# Patient Record
Sex: FEMALE | Race: WHITE | NOT HISPANIC OR LATINO | URBAN - NONMETROPOLITAN AREA
[De-identification: names, ages, dates, MRNs, and addresses within clinical notes are randomized per-mention and may not be internally consistent; named-entity substitution may affect disease eponyms.]

---

## 2017-01-04 RX ORDER — OXYCODONE AND ACETAMINOPHEN 10; 325 MG/1; MG/1
1 TABLET ORAL 3 TIMES DAILY
COMMUNITY

## 2017-01-04 RX ORDER — ASPIRIN 325 MG
325 TABLET ORAL DAILY
COMMUNITY

## 2017-01-04 RX ORDER — HYDROCHLOROTHIAZIDE 25 MG/1
25 TABLET ORAL 2 TIMES DAILY
COMMUNITY

## 2017-01-04 RX ORDER — PRAVASTATIN SODIUM 40 MG
40 TABLET ORAL DAILY
COMMUNITY

## 2017-01-04 RX ORDER — GABAPENTIN 600 MG/1
600 TABLET ORAL 4 TIMES DAILY
COMMUNITY

## 2017-01-04 RX ORDER — AMITRIPTYLINE HYDROCHLORIDE 50 MG/1
100 TABLET, FILM COATED ORAL NIGHTLY
COMMUNITY

## 2017-01-04 RX ORDER — FENOFIBRATE 145 MG/1
145 TABLET, COATED ORAL DAILY
COMMUNITY

## 2017-01-04 RX ORDER — ATORVASTATIN CALCIUM 40 MG/1
40 TABLET, FILM COATED ORAL DAILY
COMMUNITY

## 2017-02-23 ENCOUNTER — APPOINTMENT (OUTPATIENT)
Dept: ULTRASOUND IMAGING | Facility: HOSPITAL | Age: 62
End: 2017-02-23
Attending: SURGERY

## 2017-06-09 ENCOUNTER — TELEPHONE (OUTPATIENT)
Dept: CARDIOLOGY | Age: 62
End: 2017-06-09

## 2017-07-10 ENCOUNTER — TELEPHONE (OUTPATIENT)
Dept: CARDIOLOGY | Age: 62
End: 2017-07-10

## 2017-07-28 ENCOUNTER — OFFICE VISIT (OUTPATIENT)
Dept: CARDIOLOGY | Age: 62
End: 2017-07-28
Payer: MEDICARE

## 2017-07-28 VITALS
HEART RATE: 58 BPM | DIASTOLIC BLOOD PRESSURE: 62 MMHG | BODY MASS INDEX: 26.8 KG/M2 | SYSTOLIC BLOOD PRESSURE: 122 MMHG | WEIGHT: 157 LBS | HEIGHT: 64 IN

## 2017-07-28 DIAGNOSIS — I10 ESSENTIAL HYPERTENSION: ICD-10-CM

## 2017-07-28 DIAGNOSIS — I25.10 CORONARY ARTERY DISEASE INVOLVING NATIVE HEART WITHOUT ANGINA PECTORIS, UNSPECIFIED VESSEL OR LESION TYPE: ICD-10-CM

## 2017-07-28 DIAGNOSIS — Z72.0 TOBACCO ABUSE: ICD-10-CM

## 2017-07-28 DIAGNOSIS — E78.2 MIXED HYPERLIPIDEMIA: ICD-10-CM

## 2017-07-28 DIAGNOSIS — R07.89 OTHER CHEST PAIN: Primary | ICD-10-CM

## 2017-07-28 DIAGNOSIS — E11.8 TYPE 2 DIABETES MELLITUS WITH COMPLICATION, WITHOUT LONG-TERM CURRENT USE OF INSULIN (HCC): ICD-10-CM

## 2017-07-28 PROCEDURE — 3017F COLORECTAL CA SCREEN DOC REV: CPT | Performed by: CLINICAL NURSE SPECIALIST

## 2017-07-28 PROCEDURE — 99406 BEHAV CHNG SMOKING 3-10 MIN: CPT | Performed by: CLINICAL NURSE SPECIALIST

## 2017-07-28 PROCEDURE — 99214 OFFICE O/P EST MOD 30 MIN: CPT | Performed by: CLINICAL NURSE SPECIALIST

## 2017-07-28 PROCEDURE — G8598 ASA/ANTIPLAT THER USED: HCPCS | Performed by: CLINICAL NURSE SPECIALIST

## 2017-07-28 PROCEDURE — 4004F PT TOBACCO SCREEN RCVD TLK: CPT | Performed by: CLINICAL NURSE SPECIALIST

## 2017-07-28 PROCEDURE — G8419 CALC BMI OUT NRM PARAM NOF/U: HCPCS | Performed by: CLINICAL NURSE SPECIALIST

## 2017-07-28 PROCEDURE — G8427 DOCREV CUR MEDS BY ELIG CLIN: HCPCS | Performed by: CLINICAL NURSE SPECIALIST

## 2017-07-28 PROCEDURE — 3014F SCREEN MAMMO DOC REV: CPT | Performed by: CLINICAL NURSE SPECIALIST

## 2017-07-28 PROCEDURE — 3046F HEMOGLOBIN A1C LEVEL >9.0%: CPT | Performed by: CLINICAL NURSE SPECIALIST

## 2017-07-28 ASSESSMENT — ENCOUNTER SYMPTOMS
HEARTBURN: 0
ORTHOPNEA: 0
NAUSEA: 0
SHORTNESS OF BREATH: 1
VOMITING: 0
BLURRED VISION: 0
CHEST TIGHTNESS: 0
COUGH: 0

## 2017-08-01 ENCOUNTER — HOSPITAL ENCOUNTER (OUTPATIENT)
Dept: NON INVASIVE DIAGNOSTICS | Age: 62
Discharge: HOME | End: 2017-08-01
Payer: MEDICARE

## 2017-08-01 VITALS
BODY MASS INDEX: 26.95 KG/M2 | WEIGHT: 157 LBS | HEART RATE: 98 BPM | DIASTOLIC BLOOD PRESSURE: 63 MMHG | RESPIRATION RATE: 16 BRPM | SYSTOLIC BLOOD PRESSURE: 143 MMHG

## 2017-08-01 DIAGNOSIS — R07.89 OTHER CHEST PAIN: ICD-10-CM

## 2017-08-01 PROCEDURE — 2580000003 HC RX 258: Performed by: INTERNAL MEDICINE

## 2017-08-01 PROCEDURE — 93350 STRESS TTE ONLY: CPT

## 2017-08-01 PROCEDURE — 6360000002 HC RX W HCPCS: Performed by: INTERNAL MEDICINE

## 2017-08-01 RX ORDER — SODIUM CHLORIDE 9 MG/ML
INJECTION, SOLUTION INTRAVENOUS
Status: COMPLETED | OUTPATIENT
Start: 2017-08-01 | End: 2017-08-01

## 2017-08-01 RX ORDER — DOBUTAMINE HYDROCHLORIDE 200 MG/100ML
10 INJECTION INTRAVENOUS ONCE
Status: COMPLETED | OUTPATIENT
Start: 2017-08-01 | End: 2017-08-01

## 2017-08-01 RX ORDER — SODIUM CHLORIDE 0.9 % (FLUSH) 0.9 %
10 SYRINGE (ML) INJECTION PRN
Status: ACTIVE | OUTPATIENT
Start: 2017-08-01 | End: 2017-08-02

## 2017-08-01 RX ADMIN — SODIUM CHLORIDE 50 ML: 9 INJECTION, SOLUTION INTRAVENOUS at 12:24

## 2017-08-01 RX ADMIN — DOBUTAMINE HYDROCHLORIDE 10 MCG/KG/MIN: 200 INJECTION INTRAVENOUS at 12:24

## 2017-08-01 RX ADMIN — Medication 10 ML: at 12:10

## 2018-02-01 ENCOUNTER — OFFICE VISIT (OUTPATIENT)
Dept: CARDIOLOGY | Age: 63
End: 2018-02-01
Payer: COMMERCIAL

## 2018-02-01 VITALS
HEART RATE: 66 BPM | SYSTOLIC BLOOD PRESSURE: 110 MMHG | HEIGHT: 65 IN | BODY MASS INDEX: 25.83 KG/M2 | WEIGHT: 155 LBS | DIASTOLIC BLOOD PRESSURE: 66 MMHG

## 2018-02-01 DIAGNOSIS — I25.110 CORONARY ARTERY DISEASE INVOLVING NATIVE HEART WITH UNSTABLE ANGINA PECTORIS, UNSPECIFIED VESSEL OR LESION TYPE (HCC): ICD-10-CM

## 2018-02-01 DIAGNOSIS — I10 ESSENTIAL HYPERTENSION: Primary | ICD-10-CM

## 2018-02-01 DIAGNOSIS — E78.2 MIXED HYPERLIPIDEMIA: ICD-10-CM

## 2018-02-01 PROCEDURE — 99213 OFFICE O/P EST LOW 20 MIN: CPT | Performed by: INTERNAL MEDICINE

## 2018-02-01 RX ORDER — NITROGLYCERIN 0.4 MG/1
0.4 TABLET SUBLINGUAL EVERY 5 MIN PRN
Qty: 25 TABLET | Refills: 3 | Status: ON HOLD | OUTPATIENT
Start: 2018-02-01 | End: 2018-06-12 | Stop reason: HOSPADM

## 2018-02-01 RX ORDER — AMITRIPTYLINE HYDROCHLORIDE 100 MG/1
100 TABLET, FILM COATED ORAL NIGHTLY
COMMUNITY

## 2018-02-01 RX ORDER — ATORVASTATIN CALCIUM 40 MG/1
40 TABLET, FILM COATED ORAL DAILY
Status: ON HOLD | COMMUNITY
End: 2018-06-12

## 2018-02-05 NOTE — PROGRESS NOTES
100 mg by mouth nightly      FENOFIBRATE PO Take 72 mg by mouth daily      nitroGLYCERIN (NITROSTAT) 0.4 MG SL tablet Place 1 tablet under the tongue every 5 minutes as needed for Chest pain 25 tablet 3    SITagliptin (JANUVIA) 100 MG tablet Take 100 mg by mouth daily      oxyCODONE-acetaminophen (PERCOCET)  MG per tablet Take 1 tablet by mouth 3 times daily      hydrochlorothiazide (HYDRODIURIL) 25 MG tablet Take 25 mg by mouth daily      aspirin 325 MG tablet Take 325 mg by mouth daily      metoprolol (LOPRESSOR) 50 MG tablet Take 1 tablet by mouth 2 times daily 60 tablet 0    gabapentin (NEURONTIN) 600 MG tablet Take 1 tablet by mouth every 6 hours. Data:  BP Readings from Last 3 Encounters:   02/01/18 110/66   08/01/17 (!) 143/63   07/28/17 122/62    Pulse Readings from Last 3 Encounters:   02/01/18 66   08/01/17 98   07/28/17 58        REVIEW OF SYSTEMS  Constitutional:  Negative for diaphoresis, fever, appetite change or unexpected weight change. HENT:  Negative for nosebleeds, facial swelling, rhinorrhea and neck stiffness. RESPIRATORY:  Negative shortness of breath, cough or sputum production. No wheezing or stridor. CARDIOVASCULAR:  There is no angina, no overt heart failure, and no syncope. GASTROINTESTINAL:   Negative for abdominal distention. GENITOURINARY:  Negative for dysuria, urgency and frequency. MUSCULOSKELETAL:   Negative for myalgia, arthralgia and gait problem. SKIN:  Negative for color change, pallor, rash and wound. NEUROLOGICAL:   Negative for dizziness, weakness, light-headedness, numbness and headaches. Negative for speech difficulty. HEMATOLOGICAL:   Negative for bruising and bleeding easily. PSYCHIATRIC/BEHAVIORAL:   No excessive anxiety or confusion. Except as noted in the HPI, all other systems are negative. PHYSICAL EXAMINATION  GENERAL:  Alert and oriented x3 in no apparent distress. Short-term and long-term memory intact.   Judgment intact. Oriented to time, place and person. No depression, anxiety or agitation. Vital Signs:  /66   Pulse 66   Ht 5' 4.5\" (1.638 m)   Wt 155 lb (70.3 kg)   BMI 26.19 kg/m²    HEAD:  Normocephalic without evidence of old or recent trauma. EYES:  Sclerae clear. Conjunctivae pink. Pupils equal and round. NOSE:  Negative nasal discharge or epistaxis. THROAT:  No lesions on lips or buccal mucosa. NECK:  Supple without mass or JVD. Carotid pulses 2+ to palpation bilaterally without bruit. No thyromegaly noted. CHEST:  Equal bilateral expansion. RESPIRATORY:  The lungs are clear to auscultation. Normal respiratory effort. CARDIOVASCULAR:  The heart's rhythm is regular with a normal rate. No audible murmurs or gallops. ABDOMEN:  Soft, nontender. Exhibits no distension. Bowel sounds are normal.   UPPER EXTREMITY EVALUATION:  Radial pulses palpable bilaterally. No cyanosis, clubbing or edema. LOWER EXTREMITY EVALUATION:  Femoral, popliteal, dorsalis pedis, and posterior tibialis pulses 2+ to palpation bilaterally. No cyanosis, clubbing, or peripheral edema. SKIN:  Warm and dry. MUSCULOSKELETAL:  Normal muscle strength and tone. SKIN:  Warm, dry. NEUROLOGIC:  Cranial nerves II through XII are grossly intact. IMPRESSION / PLAN  1. Coronary artery disease without angina. 2.  Hypertension which is well controlled. 3.  Continue current medication regimen. 4.  Wellness visit in six months. Return to see me in a year. Orders Placed This Encounter   Medications    nitroGLYCERIN (NITROSTAT) 0.4 MG SL tablet     Sig: Place 1 tablet under the tongue every 5 minutes as needed for Chest pain     Dispense:  25 tablet     Refill:  3     __________________________________  Gregery Bread. Reid Sullivan M.D., Ph.D., F.A.C.C. [id number] [facility]    cc (pcp):  Sravan Green M.D.

## 2018-06-07 ENCOUNTER — HOSPITAL ENCOUNTER (OUTPATIENT)
Dept: GENERAL RADIOLOGY | Age: 63
Discharge: HOME | End: 2018-06-07
Payer: COMMERCIAL

## 2018-06-07 ENCOUNTER — OFFICE VISIT (OUTPATIENT)
Dept: CARDIOLOGY | Age: 63
End: 2018-06-07
Payer: MEDICARE

## 2018-06-07 VITALS
SYSTOLIC BLOOD PRESSURE: 124 MMHG | WEIGHT: 154 LBS | BODY MASS INDEX: 26.29 KG/M2 | DIASTOLIC BLOOD PRESSURE: 76 MMHG | HEIGHT: 64 IN | HEART RATE: 68 BPM

## 2018-06-07 DIAGNOSIS — R07.9 CHEST PAIN IN ADULT: ICD-10-CM

## 2018-06-07 DIAGNOSIS — R53.83 OTHER FATIGUE: ICD-10-CM

## 2018-06-07 DIAGNOSIS — R11.0 NAUSEA: ICD-10-CM

## 2018-06-07 DIAGNOSIS — R07.9 CHEST PAIN IN ADULT: Primary | ICD-10-CM

## 2018-06-07 DIAGNOSIS — I25.10 ATHEROSCLEROTIC CARDIOVASCULAR DISEASE: ICD-10-CM

## 2018-06-07 LAB
ANION GAP SERPL CALCULATED.3IONS-SCNC: 15 MMOL/L (ref 7–19)
BUN BLDV-MCNC: 9 MG/DL (ref 8–23)
CALCIUM SERPL-MCNC: 9.4 MG/DL (ref 8.8–10.2)
CHLORIDE BLD-SCNC: 94 MMOL/L (ref 98–111)
CO2: 28 MMOL/L (ref 22–29)
CREAT SERPL-MCNC: 0.7 MG/DL (ref 0.5–0.9)
GFR NON-AFRICAN AMERICAN: >60
GLUCOSE BLD-MCNC: 168 MG/DL (ref 74–109)
HCT VFR BLD CALC: 42.9 % (ref 37–47)
HEMOGLOBIN: 13.7 G/DL (ref 12–16)
MCH RBC QN AUTO: 30.2 PG (ref 27–31)
MCHC RBC AUTO-ENTMCNC: 31.9 G/DL (ref 33–37)
MCV RBC AUTO: 94.5 FL (ref 81–99)
PDW BLD-RTO: 13.4 % (ref 11.5–14.5)
PLATELET # BLD: 274 K/UL (ref 130–400)
PMV BLD AUTO: 12 FL (ref 9.4–12.3)
POTASSIUM SERPL-SCNC: 4 MMOL/L (ref 3.5–5)
RBC # BLD: 4.54 M/UL (ref 4.2–5.4)
SODIUM BLD-SCNC: 137 MMOL/L (ref 136–145)
WBC # BLD: 11 K/UL (ref 4.8–10.8)

## 2018-06-07 PROCEDURE — G8427 DOCREV CUR MEDS BY ELIG CLIN: HCPCS | Performed by: INTERNAL MEDICINE

## 2018-06-07 PROCEDURE — 71046 X-RAY EXAM CHEST 2 VIEWS: CPT

## 2018-06-07 PROCEDURE — 3017F COLORECTAL CA SCREEN DOC REV: CPT | Performed by: INTERNAL MEDICINE

## 2018-06-07 PROCEDURE — G8598 ASA/ANTIPLAT THER USED: HCPCS | Performed by: INTERNAL MEDICINE

## 2018-06-07 PROCEDURE — 99214 OFFICE O/P EST MOD 30 MIN: CPT | Performed by: INTERNAL MEDICINE

## 2018-06-07 PROCEDURE — 4004F PT TOBACCO SCREEN RCVD TLK: CPT | Performed by: INTERNAL MEDICINE

## 2018-06-07 PROCEDURE — G8417 CALC BMI ABV UP PARAM F/U: HCPCS | Performed by: INTERNAL MEDICINE

## 2018-06-12 ENCOUNTER — HOSPITAL ENCOUNTER (OUTPATIENT)
Dept: CARDIAC CATH/INVASIVE PROCEDURES | Age: 63
Discharge: HOME | End: 2018-06-12
Attending: INTERNAL MEDICINE | Admitting: INTERNAL MEDICINE
Payer: MEDICARE

## 2018-06-12 VITALS
TEMPERATURE: 98.6 F | WEIGHT: 154 LBS | HEIGHT: 64 IN | BODY MASS INDEX: 26.29 KG/M2 | SYSTOLIC BLOOD PRESSURE: 131 MMHG | DIASTOLIC BLOOD PRESSURE: 77 MMHG | HEART RATE: 73 BPM | RESPIRATION RATE: 18 BRPM | OXYGEN SATURATION: 95 %

## 2018-06-12 PROBLEM — I20.0 ANGINA PECTORIS, CRESCENDO (HCC): Status: ACTIVE | Noted: 2018-06-12

## 2018-06-12 PROCEDURE — 6360000002 HC RX W HCPCS

## 2018-06-12 PROCEDURE — 2580000003 HC RX 258: Performed by: INTERNAL MEDICINE

## 2018-06-12 PROCEDURE — 2720000001 HC MISC SURG SUPPLY STERILE $51-500

## 2018-06-12 PROCEDURE — 93458 L HRT ARTERY/VENTRICLE ANGIO: CPT | Performed by: INTERNAL MEDICINE

## 2018-06-12 PROCEDURE — 2709999900 HC NON-CHARGEABLE SUPPLY

## 2018-06-12 PROCEDURE — 93005 ELECTROCARDIOGRAM TRACING: CPT

## 2018-06-12 PROCEDURE — 99999 PR OFFICE/OUTPT VISIT,PROCEDURE ONLY: CPT | Performed by: INTERNAL MEDICINE

## 2018-06-12 PROCEDURE — C1760 CLOSURE DEV, VASC: HCPCS

## 2018-06-12 PROCEDURE — C1894 INTRO/SHEATH, NON-LASER: HCPCS

## 2018-06-12 PROCEDURE — 99024 POSTOP FOLLOW-UP VISIT: CPT | Performed by: INTERNAL MEDICINE

## 2018-06-12 RX ORDER — SODIUM CHLORIDE 9 MG/ML
INJECTION, SOLUTION INTRAVENOUS CONTINUOUS
Status: DISCONTINUED | OUTPATIENT
Start: 2018-06-12 | End: 2018-06-12 | Stop reason: HOSPADM

## 2018-06-12 RX ORDER — SODIUM CHLORIDE 9 MG/ML
INJECTION, SOLUTION INTRAVENOUS CONTINUOUS
Status: DISCONTINUED | OUTPATIENT
Start: 2018-06-12 | End: 2018-06-12 | Stop reason: SDUPTHER

## 2018-06-12 RX ORDER — SODIUM CHLORIDE 0.9 % (FLUSH) 0.9 %
10 SYRINGE (ML) INJECTION PRN
Status: DISCONTINUED | OUTPATIENT
Start: 2018-06-12 | End: 2018-06-12 | Stop reason: HOSPADM

## 2018-06-12 RX ORDER — ATORVASTATIN CALCIUM 40 MG/1
40 TABLET, FILM COATED ORAL DAILY
Qty: 30 TABLET | Refills: 3 | Status: SHIPPED | OUTPATIENT
Start: 2018-06-12 | End: 2018-08-01 | Stop reason: ALTCHOICE

## 2018-06-12 RX ORDER — ISOSORBIDE MONONITRATE 30 MG/1
30 TABLET, EXTENDED RELEASE ORAL DAILY
Qty: 30 TABLET | Refills: 3 | Status: SHIPPED | OUTPATIENT
Start: 2018-06-12 | End: 2018-11-02 | Stop reason: SDUPTHER

## 2018-06-12 RX ORDER — SODIUM CHLORIDE 0.9 % (FLUSH) 0.9 %
10 SYRINGE (ML) INJECTION EVERY 12 HOURS SCHEDULED
Status: DISCONTINUED | OUTPATIENT
Start: 2018-06-12 | End: 2018-06-12 | Stop reason: HOSPADM

## 2018-06-12 RX ORDER — NITROGLYCERIN 0.4 MG/1
0.4 TABLET SUBLINGUAL EVERY 5 MIN PRN
Qty: 25 TABLET | Refills: 3 | Status: SHIPPED | OUTPATIENT
Start: 2018-06-12

## 2018-06-12 RX ADMIN — SODIUM CHLORIDE: 9 INJECTION, SOLUTION INTRAVENOUS at 11:46

## 2018-06-13 LAB
EKG P AXIS: 69 DEGREES
EKG P-R INTERVAL: 182 MS
EKG Q-T INTERVAL: 380 MS
EKG QRS DURATION: 98 MS
EKG QTC CALCULATION (BAZETT): 413 MS
EKG T AXIS: 76 DEGREES

## 2018-08-01 ENCOUNTER — OFFICE VISIT (OUTPATIENT)
Dept: CARDIOLOGY | Age: 63
End: 2018-08-01
Payer: MEDICARE

## 2018-08-01 VITALS
WEIGHT: 153.6 LBS | HEART RATE: 64 BPM | SYSTOLIC BLOOD PRESSURE: 136 MMHG | DIASTOLIC BLOOD PRESSURE: 80 MMHG | BODY MASS INDEX: 26.22 KG/M2 | HEIGHT: 64 IN

## 2018-08-01 DIAGNOSIS — I25.10 CORONARY ARTERY DISEASE INVOLVING NATIVE HEART WITHOUT ANGINA PECTORIS, UNSPECIFIED VESSEL OR LESION TYPE: Primary | ICD-10-CM

## 2018-08-01 DIAGNOSIS — I10 ESSENTIAL HYPERTENSION: ICD-10-CM

## 2018-08-01 PROCEDURE — 93000 ELECTROCARDIOGRAM COMPLETE: CPT | Performed by: INTERNAL MEDICINE

## 2018-08-01 PROCEDURE — 99214 OFFICE O/P EST MOD 30 MIN: CPT | Performed by: INTERNAL MEDICINE

## 2018-08-01 PROCEDURE — 3017F COLORECTAL CA SCREEN DOC REV: CPT | Performed by: INTERNAL MEDICINE

## 2018-08-01 PROCEDURE — G8598 ASA/ANTIPLAT THER USED: HCPCS | Performed by: INTERNAL MEDICINE

## 2018-08-01 PROCEDURE — G8427 DOCREV CUR MEDS BY ELIG CLIN: HCPCS | Performed by: INTERNAL MEDICINE

## 2018-08-01 PROCEDURE — G8417 CALC BMI ABV UP PARAM F/U: HCPCS | Performed by: INTERNAL MEDICINE

## 2018-08-01 PROCEDURE — 4004F PT TOBACCO SCREEN RCVD TLK: CPT | Performed by: INTERNAL MEDICINE

## 2018-08-01 RX ORDER — GLIPIZIDE 10 MG/1
10 TABLET ORAL
COMMUNITY

## 2018-08-01 RX ORDER — ROSUVASTATIN CALCIUM 10 MG/1
10 TABLET, COATED ORAL DAILY
COMMUNITY
End: 2018-10-29 | Stop reason: SDUPTHER

## 2018-08-01 ASSESSMENT — ENCOUNTER SYMPTOMS: SHORTNESS OF BREATH: 0

## 2018-08-01 NOTE — PATIENT INSTRUCTIONS
Tips to Help You Stop Smoking   Cigarette smoking is a preventable cause of death in the [place]. If you have thought about quitting but haven't been able to, here are some reasons why you should and some ways to do it. Here's Why   Quitting smoking now can decrease your risk of getting smoking-related illnesses like:   Heart disease, Stroke,  Cataracts, Macular degeneration, Thyroid conditions, Hearing loss, Erectile dysfunction, Dementia, Osteoporosis. Several types of cancer, including:   Lung, Mouth, Esophagus, Larynx, Bladder, Pancreas, Kidney   Chronic lung diseases:   Bronchitis, Emphysema, Asthma   Here's How   Once you've decided to quit smoking, set your target quit date a few weeks away. In the time leading up to your quit day, try some of these ideas offered by the [address] to help you successfully quit smoking. For the best results, work with your doctor. Together, you can test your lung function and compare the results to those of a nonsmoking person. The results can be given to you as your lung age. Finding out your lung age right after having the test done may help you to stop smoking. Your doctor can also discuss with you all of your options and refer you to smoking-cessation support groups. You may wish to use nicotine replacement (gum, patches, inhaler) or one of the prescription medications that have been shown to increase quit rates and prolong abstinence from smoking. But whatever you and your doctor decide on these matters, it will still be you who decides when an how to quit. Here are some techniques:     Switch Brands   Switch to a brand you find distasteful. Change to a brand that is low in tar and nicotine a couple of weeks before your target quit date. This will help change your smoking behavior.  However, do not smoke more cigarettes, inhale them more often or more deeply, or place your fingertips over the

## 2018-08-01 NOTE — PROGRESS NOTES
ADENOIDECTOMY         Family History   Problem Relation Age of Onset    Heart Attack Father        Social History     Social History    Marital status:      Spouse name: N/A    Number of children: N/A    Years of education: N/A     Occupational History    Not on file.      Social History Main Topics    Smoking status: Current Every Day Smoker     Packs/day: 1.00     Types: Cigarettes    Smokeless tobacco: Never Used    Alcohol use No    Drug use: No    Sexual activity: Not on file     Other Topics Concern    Not on file     Social History Narrative    No narrative on file       Allergies   Allergen Reactions    Hydrocodone     Sulfa Antibiotics          Current Outpatient Prescriptions:     rosuvastatin (CRESTOR) 10 MG tablet, Take 10 mg by mouth daily, Disp: , Rfl:     glipiZIDE (GLUCOTROL) 10 MG tablet, Take 10 mg by mouth 2 times daily (before meals), Disp: , Rfl:     isosorbide mononitrate (IMDUR) 30 MG extended release tablet, Take 1 tablet by mouth daily, Disp: 30 tablet, Rfl: 3    nitroGLYCERIN (NITROSTAT) 0.4 MG SL tablet, Place 1 tablet under the tongue every 5 minutes as needed for Chest pain, Disp: 25 tablet, Rfl: 3    metFORMIN (GLUCOPHAGE) 1000 MG tablet, Take 1 tablet by mouth 2 times daily (with meals), Disp: 60 tablet, Rfl: 3    amitriptyline (ELAVIL) 100 MG tablet, Take 100 mg by mouth nightly, Disp: , Rfl:     FENOFIBRATE PO, Take 72 mg by mouth daily, Disp: , Rfl:     SITagliptin (JANUVIA) 100 MG tablet, Take 100 mg by mouth daily, Disp: , Rfl:     oxyCODONE-acetaminophen (PERCOCET)  MG per tablet, Take 1 tablet by mouth 3 times daily, Disp: , Rfl:     hydrochlorothiazide (HYDRODIURIL) 25 MG tablet, Take 25 mg by mouth daily, Disp: , Rfl:     aspirin 325 MG tablet, Take 325 mg by mouth daily, Disp: , Rfl:     metoprolol (LOPRESSOR) 50 MG tablet, Take 1 tablet by mouth 2 times daily, Disp: 60 tablet, Rfl: 0    gabapentin (NEURONTIN) 600 MG tablet, Take 1

## 2018-10-29 RX ORDER — ROSUVASTATIN CALCIUM 10 MG/1
10 TABLET, COATED ORAL DAILY
Qty: 30 TABLET | Refills: 0 | Status: SHIPPED | OUTPATIENT
Start: 2018-10-29

## 2018-11-02 RX ORDER — ISOSORBIDE MONONITRATE 30 MG/1
30 TABLET, EXTENDED RELEASE ORAL DAILY
Qty: 30 TABLET | Refills: 5 | Status: SHIPPED | OUTPATIENT
Start: 2018-11-02 | End: 2019-06-20 | Stop reason: SDUPTHER

## 2019-06-20 ENCOUNTER — OFFICE VISIT (OUTPATIENT)
Dept: CARDIOLOGY | Age: 64
End: 2019-06-20
Payer: MEDICARE

## 2019-06-20 VITALS
WEIGHT: 160 LBS | HEIGHT: 65 IN | SYSTOLIC BLOOD PRESSURE: 110 MMHG | BODY MASS INDEX: 26.66 KG/M2 | HEART RATE: 68 BPM | DIASTOLIC BLOOD PRESSURE: 60 MMHG

## 2019-06-20 DIAGNOSIS — E78.2 MIXED HYPERLIPIDEMIA: ICD-10-CM

## 2019-06-20 DIAGNOSIS — I25.10 CORONARY ARTERY DISEASE INVOLVING NATIVE CORONARY ARTERY OF NATIVE HEART WITHOUT ANGINA PECTORIS: Primary | ICD-10-CM

## 2019-06-20 DIAGNOSIS — I10 ESSENTIAL HYPERTENSION: ICD-10-CM

## 2019-06-20 PROCEDURE — G8427 DOCREV CUR MEDS BY ELIG CLIN: HCPCS | Performed by: INTERNAL MEDICINE

## 2019-06-20 PROCEDURE — G8598 ASA/ANTIPLAT THER USED: HCPCS | Performed by: INTERNAL MEDICINE

## 2019-06-20 PROCEDURE — G8417 CALC BMI ABV UP PARAM F/U: HCPCS | Performed by: INTERNAL MEDICINE

## 2019-06-20 PROCEDURE — 4004F PT TOBACCO SCREEN RCVD TLK: CPT | Performed by: INTERNAL MEDICINE

## 2019-06-20 PROCEDURE — 99214 OFFICE O/P EST MOD 30 MIN: CPT | Performed by: INTERNAL MEDICINE

## 2019-06-20 PROCEDURE — 3017F COLORECTAL CA SCREEN DOC REV: CPT | Performed by: INTERNAL MEDICINE

## 2019-06-20 RX ORDER — ISOSORBIDE MONONITRATE 30 MG/1
30 TABLET, EXTENDED RELEASE ORAL DAILY
Qty: 30 TABLET | Refills: 5 | Status: SHIPPED | OUTPATIENT
Start: 2019-06-20 | End: 2020-01-20 | Stop reason: SDUPTHER

## 2019-06-20 RX ORDER — CITALOPRAM 20 MG/1
20 TABLET ORAL DAILY
COMMUNITY

## 2019-06-20 RX ORDER — PANTOPRAZOLE SODIUM 20 MG/1
20 TABLET, DELAYED RELEASE ORAL DAILY
COMMUNITY

## 2019-06-20 RX ORDER — FUROSEMIDE 40 MG/1
40 TABLET ORAL DAILY
COMMUNITY

## 2019-06-20 ASSESSMENT — ENCOUNTER SYMPTOMS
DIARRHEA: 0
RESPIRATORY NEGATIVE: 1
VOMITING: 0
SHORTNESS OF BREATH: 0
GASTROINTESTINAL NEGATIVE: 1
NAUSEA: 0
EYES NEGATIVE: 1

## 2019-06-20 NOTE — PROGRESS NOTES
[facility] Progress Note                            Date:  6/20/2019  Patient: Brian Trinidad  Age:  62 y. o., 1955      Reason for evaluation:         SUBJECTIVE:    Today resting comfortably history of stent placement 3 years ago. Continues to smoke but trying to quit. Has some chronic dyspnea unchanged denies anginal chest pain no recent nitroglycerin to been taken. No other complaints. Review of Systems   Constitutional: Negative. Negative for chills, fever and unexpected weight change. HENT: Negative. Eyes: Negative. Respiratory: Negative. Negative for shortness of breath. Cardiovascular: Negative. Negative for chest pain. Gastrointestinal: Negative. Negative for diarrhea, nausea and vomiting. Endocrine: Negative. Genitourinary: Negative. Musculoskeletal: Negative. Skin: Negative. Neurological: Negative. All other systems reviewed and are negative. OBJECTIVE:     /60   Pulse 68   Ht 5' 4.5\" (1.638 m)   Wt 160 lb (72.6 kg)   BMI 27.04 kg/m²     Labs:   CBC: No results for input(s): WBC, HGB, HCT, PLT in the last 72 hours. BMP:No results for input(s): NA, K, CO2, BUN, CREATININE, LABGLOM, GLUCOSE in the last 72 hours. BNP: No results for input(s): BNP in the last 72 hours. PT/INR: No results for input(s): PROTIME, INR in the last 72 hours. APTT:No results for input(s): APTT in the last 72 hours. CARDIAC ENZYMES:No results for input(s): CKTOTAL, CKMB, CKMBINDEX, TROPONINI in the last 72 hours. FASTING LIPID PANEL:  Lab Results   Component Value Date    HDL 30 12/03/2014    LDLDIRECT 109 12/03/2014    TRIG 496 12/03/2014     LIVER PROFILE:No results for input(s): AST, ALT, LABALBU in the last 72 hours.         Past Medical History:   Diagnosis Date    CAD (coronary artery disease)     Diabetes mellitus ([address]     History of blood transfusion     HTN (hypertension)     Hyperlipidemia     Smoker     Thyroid disease      Past Surgical History:   Procedure Laterality Date    CARDIAC CATHETERIZATION  12/3/14  MDL    stent to LAD. EF 60%     SECTION      COLONOSCOPY      HERNIA REPAIR      THYROID SURGERY      TONSILLECTOMY AND ADENOIDECTOMY       Family History   Problem Relation Age of Onset    Heart Attack Father      Allergies   Allergen Reactions    Hydrocodone     Sulfa Antibiotics      Current Outpatient Medications   Medication Sig Dispense Refill    citalopram (CELEXA) 20 MG tablet Take 20 mg by mouth daily      pantoprazole (PROTONIX) 20 MG tablet Take 20 mg by mouth daily      furosemide (LASIX) 40 MG tablet Take 40 mg by mouth daily      rosuvastatin (CRESTOR) 10 MG tablet Take 1 tablet by mouth daily 30 tablet 0    glipiZIDE (GLUCOTROL) 10 MG tablet Take 10 mg by mouth 2 times daily (before meals)      nitroGLYCERIN (NITROSTAT) 0.4 MG SL tablet Place 1 tablet under the tongue every 5 minutes as needed for Chest pain 25 tablet 3    metFORMIN (GLUCOPHAGE) 1000 MG tablet Take 1 tablet by mouth 2 times daily (with meals) 60 tablet 3    amitriptyline (ELAVIL) 100 MG tablet Take 100 mg by mouth nightly      FENOFIBRATE PO Take 72 mg by mouth daily      SITagliptin (JANUVIA) 100 MG tablet Take 100 mg by mouth daily      oxyCODONE-acetaminophen (PERCOCET)  MG per tablet Take 1 tablet by mouth 3 times daily      aspirin 325 MG tablet Take 325 mg by mouth daily      metoprolol (LOPRESSOR) 50 MG tablet Take 1 tablet by mouth 2 times daily 60 tablet 0    gabapentin (NEURONTIN) 600 MG tablet Take 1 tablet by mouth 4 times daily. Jason Anne isosorbide mononitrate (IMDUR) 30 MG extended release tablet Take 1 tablet by mouth daily 30 tablet 5     No current facility-administered medications for this visit.       Social History     Socioeconomic History    Marital status:      Spouse name: Not on file    Number of children: Not on file    Years of education: Not on file    Highest education level: Not

## 2019-12-20 ENCOUNTER — TELEPHONE (OUTPATIENT)
Dept: CARDIOLOGY | Age: 64
End: 2019-12-20

## 2020-01-20 ENCOUNTER — OFFICE VISIT (OUTPATIENT)
Dept: CARDIOLOGY | Age: 65
End: 2020-01-20
Payer: MEDICARE

## 2020-01-20 VITALS
HEIGHT: 64 IN | WEIGHT: 160 LBS | DIASTOLIC BLOOD PRESSURE: 78 MMHG | OXYGEN SATURATION: 96 % | SYSTOLIC BLOOD PRESSURE: 138 MMHG | BODY MASS INDEX: 27.31 KG/M2

## 2020-01-20 PROCEDURE — 3017F COLORECTAL CA SCREEN DOC REV: CPT | Performed by: NURSE PRACTITIONER

## 2020-01-20 PROCEDURE — G8484 FLU IMMUNIZE NO ADMIN: HCPCS | Performed by: NURSE PRACTITIONER

## 2020-01-20 PROCEDURE — 93000 ELECTROCARDIOGRAM COMPLETE: CPT | Performed by: NURSE PRACTITIONER

## 2020-01-20 PROCEDURE — G8427 DOCREV CUR MEDS BY ELIG CLIN: HCPCS | Performed by: NURSE PRACTITIONER

## 2020-01-20 PROCEDURE — G8417 CALC BMI ABV UP PARAM F/U: HCPCS | Performed by: NURSE PRACTITIONER

## 2020-01-20 PROCEDURE — 4004F PT TOBACCO SCREEN RCVD TLK: CPT | Performed by: NURSE PRACTITIONER

## 2020-01-20 PROCEDURE — 99214 OFFICE O/P EST MOD 30 MIN: CPT | Performed by: NURSE PRACTITIONER

## 2020-01-20 RX ORDER — ISOSORBIDE MONONITRATE 30 MG/1
30 TABLET, EXTENDED RELEASE ORAL DAILY
Qty: 30 TABLET | Refills: 5 | Status: SHIPPED | OUTPATIENT
Start: 2020-01-20

## 2020-01-20 NOTE — PROGRESS NOTES
Systems:    General:      Complaint / Symptom Yes / No / Description if Yes       Fatigue No   Weight gain N/A   Insomnia N/A       Respiratory:        Complaint / Symptom Yes / No / Description if Yes       Cough No   Horseness N/A       Cardiovascular:    Complaint / Symptom Yes / No / Description if Yes       Chest Pain Yes: Episode January 2, 2020   Shortness of Air / Orthopnea No   Presyncope / Syncope No   Palpitations No         Objective:    /78   Ht 5' 4\" (1.626 m)   Wt 160 lb (72.6 kg)   SpO2 96%   BMI 27.46 kg/m²     GENERAL - well developed and well nourished, conversant  HEENT -  PERRLA, Hearing appears normal, gentleman lids are normal.  External inspection of ears and nose appear normal.  NECK - no thyromegaly, no JVD, trachea is in the midline  CARDIOVASCULAR - PMI is in the mid line clavicular position, Normal S1 and S2 with no systolic murmur. No S3 or S4    PULMONARY - no respiratory distress. No wheezes or rales. Lungs are clear to ausculation, normal respiratory effort. ABDOMEN  - soft, non tender, no rebound  MUSCULOSKELETAL  - range of motion of the upper and lower extermites appears normal and equal and is without pain   EXTREMITIES - no significant edema   NEUROLOGIC - gait and station are normal  SKIN - turgor is normal, can warm and dry. PSYCHIATRIC - normal mood and affect, alert and orientated x 3,      ASSESSMENT:    ALL THE CARDIOLOGY PROBLEMS ARE LISTED ABOVE; HOWEVER, THE FOLLOWING SPECIFIC CARDIAC PROBLEMS / CONDITIONS WERE ADDRESSED AND TREATED DURING THE OFFICE VISIT TODAY:                                                                                            MEDICAL DECISION MAKING             Cardiac Specific Problem / Diagnosis  Discussion and Data Reviewed Diagnostic Procedures Ordered Management Options Selected           1.    Chest pain  Initial Encounter   Review and summation of old records:    EKG in the office showing sinus rhythm with a heart rate of 72 bpm.  ST-T wave abnormalities at baseline. Will order Abdiel Valles. Yes Continue current medications:     Yes           2. CAD  Shows no change   Patient is on Imdur 30 mg daily. Beta-blocker, statin, aspirin. Yes Continue current medications:    Yes           3. Hypertension Stable  pressure in the office today 138/78. O2 sat 96% No Continue current medications:       Yes           4. Hyperlipidemia Stable  followed by primary care provider. Patient is on Crestor 10 mg daily. No Continue current medications:       Yes   5. Tobacco abuse -still smoking      Orders Placed This Encounter   Procedures    NM MYOCARDIAL SPECT REST EXERCISE OR RX     Standing Status:   Future     Standing Expiration Date:   1/20/2021     Order Specific Question:   Reason for Exam?     Answer:   Chest pain     Order Specific Question:   Procedure Type     Answer:   Rx    EKG 12 lead     Order Specific Question:   Reason for Exam?     Answer: Other     Orders Placed This Encounter   Medications    isosorbide mononitrate (IMDUR) 30 MG extended release tablet     Sig: Take 1 tablet by mouth daily     Dispense:  30 tablet     Refill:  5       Discussed with patient. Return in about 3 weeks (around 2/10/2020) for results . I greatly appreciate the opportunity to meet Adele Izquierdo and your confidence in allowing me to participate in her cardiovascular care. GENO Williamson NP  1/20/2020 3:21 PM                    This dictation was generated by voice recognition computer software. Although all attempts are made to edit dictation for accuracy, there may be errors in the transcription that are not intended.

## 2020-01-20 NOTE — PATIENT INSTRUCTIONS
to the test.   Nitroglycerin patches must be taken off 1 hour before testing.  Wear comfortable clothing.  Please refrain from any strenuous exercise or activities the day before your test, or the day of your test.   The Nuclear Lexiscan Stress test takes about 2 ½ to 3 hours to complete. If for any reason you are unable to keep this appointment, please contact Outpatient Scheduling, [phone], as soon as possible to reschedule.

## 2020-01-23 ENCOUNTER — TELEPHONE (OUTPATIENT)
Dept: CARDIOLOGY | Age: 65
End: 2020-01-23

## 2020-02-20 ENCOUNTER — HOSPITAL ENCOUNTER (OUTPATIENT)
Dept: NUCLEAR MEDICINE | Age: 65
Discharge: HOME | End: 2020-02-22
Payer: MEDICARE

## 2020-02-20 PROCEDURE — A9500 TC99M SESTAMIBI: HCPCS | Performed by: NURSE PRACTITIONER

## 2020-02-20 PROCEDURE — 93017 CV STRESS TEST TRACING ONLY: CPT

## 2020-02-20 PROCEDURE — 3430000000 HC RX DIAGNOSTIC RADIOPHARMACEUTICAL: Performed by: NURSE PRACTITIONER

## 2020-02-20 PROCEDURE — 6360000002 HC RX W HCPCS: Performed by: INTERNAL MEDICINE

## 2020-02-20 RX ADMIN — TETRAKIS(2-METHOXYISOBUTYLISOCYANIDE)COPPER(I) TETRAFLUOROBORATE 30 MILLICURIE: 1 INJECTION, POWDER, LYOPHILIZED, FOR SOLUTION INTRAVENOUS at 11:39

## 2020-02-20 RX ADMIN — TETRAKIS(2-METHOXYISOBUTYLISOCYANIDE)COPPER(I) TETRAFLUOROBORATE 10 MILLICURIE: 1 INJECTION, POWDER, LYOPHILIZED, FOR SOLUTION INTRAVENOUS at 11:39

## 2020-02-20 RX ADMIN — REGADENOSON 0.4 MG: 0.08 INJECTION, SOLUTION INTRAVENOUS at 11:10

## 2020-02-24 LAB
LV EF: 72 %
LVEF MODALITY: NORMAL

## 2020-08-05 ENCOUNTER — OFFICE VISIT (OUTPATIENT)
Dept: SURGERY | Age: 65
End: 2020-08-05
Payer: MEDICARE

## 2020-08-05 VITALS
TEMPERATURE: 97.8 F | WEIGHT: 153.6 LBS | BODY MASS INDEX: 25.59 KG/M2 | DIASTOLIC BLOOD PRESSURE: 74 MMHG | HEIGHT: 65 IN | SYSTOLIC BLOOD PRESSURE: 130 MMHG

## 2020-08-05 PROCEDURE — 4004F PT TOBACCO SCREEN RCVD TLK: CPT | Performed by: PHYSICIAN ASSISTANT

## 2020-08-05 PROCEDURE — 99202 OFFICE O/P NEW SF 15 MIN: CPT | Performed by: PHYSICIAN ASSISTANT

## 2020-08-05 PROCEDURE — 3017F COLORECTAL CA SCREEN DOC REV: CPT | Performed by: PHYSICIAN ASSISTANT

## 2020-08-05 PROCEDURE — G8417 CALC BMI ABV UP PARAM F/U: HCPCS | Performed by: PHYSICIAN ASSISTANT

## 2020-08-05 PROCEDURE — G8427 DOCREV CUR MEDS BY ELIG CLIN: HCPCS | Performed by: PHYSICIAN ASSISTANT

## 2020-08-05 NOTE — PROGRESS NOTES
HISTORY OF PRESENT ILLNESS:  Qing Peña comes today with an epigastric hernia. It has been present for a few years but is getting larger. The pain has been mild to moderate. She denies strangulation or incarceration. Patient Active Problem List    Diagnosis Date Noted    Angina pectoris, jorge ([facility] [address] 06/12/2018    Tobacco abuse     Hypotension 01/14/2015    CAD (coronary artery disease)     HTN (hypertension)     Hyperlipidemia     Diabetes mellitus (HCC)      Current Outpatient Medications   Medication Sig Dispense Refill    isosorbide mononitrate (IMDUR) 30 MG extended release tablet Take 1 tablet by mouth daily 30 tablet 5    citalopram (CELEXA) 20 MG tablet Take 20 mg by mouth daily      pantoprazole (PROTONIX) 20 MG tablet Take 20 mg by mouth daily      furosemide (LASIX) 40 MG tablet Take 40 mg by mouth daily      rosuvastatin (CRESTOR) 10 MG tablet Take 1 tablet by mouth daily 30 tablet 0    glipiZIDE (GLUCOTROL) 10 MG tablet Take 10 mg by mouth 2 times daily (before meals)      nitroGLYCERIN (NITROSTAT) 0.4 MG SL tablet Place 1 tablet under the tongue every 5 minutes as needed for Chest pain 25 tablet 3    metFORMIN (GLUCOPHAGE) 1000 MG tablet Take 1 tablet by mouth 2 times daily (with meals) 60 tablet 3    amitriptyline (ELAVIL) 100 MG tablet Take 100 mg by mouth nightly      FENOFIBRATE PO Take 72 mg by mouth daily      SITagliptin (JANUVIA) 100 MG tablet Take 100 mg by mouth daily      oxyCODONE-acetaminophen (PERCOCET)  MG per tablet Take 1 tablet by mouth 3 times daily      aspirin 325 MG tablet Take 325 mg by mouth daily      metoprolol (LOPRESSOR) 50 MG tablet Take 1 tablet by mouth 2 times daily 60 tablet 0    gabapentin (NEURONTIN) 600 MG tablet Take 1 tablet by mouth 4 times daily. .       No current facility-administered medications for this visit.       Allergies: Hydrocodone and Sulfa antibiotics     Past Medical History:   Diagnosis Date    CAD

## 2020-08-06 ENCOUNTER — TELEPHONE (OUTPATIENT)
Dept: SURGERY | Age: 65
End: 2020-08-06

## 2020-08-06 ENCOUNTER — TELEPHONE (OUTPATIENT)
Dept: CARDIOLOGY | Age: 65
End: 2020-08-06

## 2020-08-06 NOTE — TELEPHONE ENCOUNTER
Date: TBD    Cardiologist: Mary    Procedure: epigastric hernia repair    Surgeon: Mitchell Garcia    Last Office Visit: 1/20/20  Reason for office visit and medical concerns addressed at this office visit: cad, htn, hyperlipidemia, dm    Testing Performed and Date of Service:  2/20/20 Nicolette Normal ejection fraction 72% normal perfusion study     RCRI = 0.9   METs 4    Current Medications: imdur, celexa, protonix, lasix, crestor, glipizide, nitro, metformin, amitriptyline, fenofibrate, januvia, percocet, aspirin, lopressor, gabapenting    Is the patient currently taking an anticoagulant? If so, what is the diagnosis the patient has been given to warrant the need for the anticoagulant?  no    Additional Notes: requesting cardiac clearance prior to procedure

## 2020-08-06 NOTE — TELEPHONE ENCOUNTER
Called giorgi oliva and gave her the following appointment information:    Patient is scheduled to have CT of abdomen and Pelvis with oral and IV contrast on 9/21/20 at [address] [place] at 8:30 for 9:00 appt. NPO after midnight and patient to begin drinking her oral contrast  90 minutes prior to 9:00 test, She will see Savanna Fofana the same day  She will  oral contrast when she is town on 8/27/20 for appt with Dr. Abimael Martin.

## 2020-08-27 ENCOUNTER — OFFICE VISIT (OUTPATIENT)
Dept: CARDIOLOGY | Age: 65
End: 2020-08-27
Payer: MEDICARE

## 2020-08-27 VITALS
HEART RATE: 80 BPM | DIASTOLIC BLOOD PRESSURE: 74 MMHG | SYSTOLIC BLOOD PRESSURE: 138 MMHG | BODY MASS INDEX: 25.66 KG/M2 | HEIGHT: 65 IN | WEIGHT: 154 LBS

## 2020-08-27 PROCEDURE — 4004F PT TOBACCO SCREEN RCVD TLK: CPT | Performed by: INTERNAL MEDICINE

## 2020-08-27 PROCEDURE — G8417 CALC BMI ABV UP PARAM F/U: HCPCS | Performed by: INTERNAL MEDICINE

## 2020-08-27 PROCEDURE — G8427 DOCREV CUR MEDS BY ELIG CLIN: HCPCS | Performed by: INTERNAL MEDICINE

## 2020-08-27 PROCEDURE — 99213 OFFICE O/P EST LOW 20 MIN: CPT | Performed by: INTERNAL MEDICINE

## 2020-08-27 PROCEDURE — 3017F COLORECTAL CA SCREEN DOC REV: CPT | Performed by: INTERNAL MEDICINE

## 2020-08-27 ASSESSMENT — ENCOUNTER SYMPTOMS
VOMITING: 0
RESPIRATORY NEGATIVE: 1
NAUSEA: 0
EYES NEGATIVE: 1
DIARRHEA: 0
SHORTNESS OF BREATH: 0
GASTROINTESTINAL NEGATIVE: 1

## 2020-08-27 NOTE — PROGRESS NOTES
[facility] Progress [facility]                            Date:  8/27/2020  Patient: Luis Fernando Rose  Age:  60 y.o., 1955      Reason for evaluation:         SUBJECTIVE:    Seen today resting comfortably he denies anginal chest pain or limiting dyspnea. She complained about being more fatigued perhaps abnormal.  She had recent blood work by her primary care physician but was not aware of any difficulties. Last stress test 2/20/2020 ejection fraction 72% normal perfusion study. Blood pressure 138 or 74 heart 80. Continues to smoke I encouraged her to quit. Appetite is fair no recent weight loss. Review of Systems   Constitutional: Negative. Negative for chills, fever and unexpected weight change. HENT: Negative. Eyes: Negative. Respiratory: Negative. Negative for shortness of breath. Cardiovascular: Negative. Negative for chest pain. Gastrointestinal: Negative. Negative for diarrhea, nausea and vomiting. Endocrine: Negative. Genitourinary: Negative. Musculoskeletal: Negative. Skin: Negative. Neurological: Negative. All other systems reviewed and are negative. OBJECTIVE:     /74   Pulse 80   Ht 5' 4.5\" (1.638 m)   Wt 154 lb (69.9 kg)   BMI 26.03 kg/m²     Labs:   CBC: No results for input(s): WBC, HGB, HCT, PLT in the last 72 hours. BMP:No results for input(s): NA, K, CO2, BUN, CREATININE, LABGLOM, GLUCOSE in the last 72 hours. BNP: No results for input(s): BNP in the last 72 hours. PT/INR: No results for input(s): PROTIME, INR in the last 72 hours. APTT:No results for input(s): APTT in the last 72 hours. CARDIAC ENZYMES:No results for input(s): CKTOTAL, CKMB, CKMBINDEX, TROPONINI in the last 72 hours. FASTING LIPID PANEL:  Lab Results   Component Value Date    HDL 30 12/03/2014    LDLDIRECT 109 12/03/2014    TRIG 496 12/03/2014     LIVER PROFILE:No results for input(s): AST, ALT, LABALBU in the last 72 hours.         Past Medical History: Diagnosis Date    CAD (coronary artery disease)     Diabetes mellitus ([facility] [address]     GERD (gastroesophageal reflux disease)     History of blood transfusion     HTN (hypertension)     Hyperlipidemia     Smoker     Thyroid disease      Past Surgical History:   Procedure Laterality Date    CARDIAC CATHETERIZATION  12/3/14  MDL    stent to LAD.  EF 60%     SECTION      COLONOSCOPY      HERNIA REPAIR      umbilical    THYROID SURGERY      partial    TONSILLECTOMY AND ADENOIDECTOMY       Family History   Problem Relation Age of Onset    Heart Attack Father     Diabetes Maternal Grandmother     Diabetes Paternal Grandmother      Allergies   Allergen Reactions    Hydrocodone     Sulfa Antibiotics      Current Outpatient Medications   Medication Sig Dispense Refill    Empagliflozin (JARDIANCE PO) Take by mouth      isosorbide mononitrate (IMDUR) 30 MG extended release tablet Take 1 tablet by mouth daily 30 tablet 5    citalopram (CELEXA) 20 MG tablet Take 20 mg by mouth daily      pantoprazole (PROTONIX) 20 MG tablet Take 20 mg by mouth daily      furosemide (LASIX) 40 MG tablet Take 40 mg by mouth daily      rosuvastatin (CRESTOR) 10 MG tablet Take 1 tablet by mouth daily 30 tablet 0    nitroGLYCERIN (NITROSTAT) 0.4 MG SL tablet Place 1 tablet under the tongue every 5 minutes as needed for Chest pain 25 tablet 3    metFORMIN (GLUCOPHAGE) 1000 MG tablet Take 1 tablet by mouth 2 times daily (with meals) 60 tablet 3    amitriptyline (ELAVIL) 100 MG tablet Take 100 mg by mouth nightly      FENOFIBRATE PO Take 72 mg by mouth daily      SITagliptin (JANUVIA) 100 MG tablet Take 100 mg by mouth daily      oxyCODONE-acetaminophen (PERCOCET)  MG per tablet Take 1 tablet by mouth 3 times daily      aspirin 325 MG tablet Take 325 mg by mouth daily      metoprolol (LOPRESSOR) 50 MG tablet Take 1 tablet by mouth 2 times daily 60 tablet 0    gabapentin (NEURONTIN) 600 MG tablet Take 1 tablet by mouth 4 times daily. .      glipiZIDE (GLUCOTROL) 10 MG tablet Take 10 mg by mouth 2 times daily (before meals)       No current facility-administered medications for this visit. Social History     Socioeconomic History    Marital status:      Spouse name: Not on file    Number of children: Not on file    Years of education: Not on file    Highest education level: Not on file   Occupational History    Not on file   Social Needs    Financial resource strain: Not on file    Food insecurity     Worry: Not on file     Inability: Not on file    Transportation needs     Medical: Not on file     Non-medical: Not on file   Tobacco Use    Smoking status: Current Every Day Smoker     Packs/day: 1.00     Types: Cigarettes    Smokeless tobacco: Never Used   Substance and Sexual Activity    Alcohol use: No    Drug use: No    Sexual activity: Not on file   Lifestyle    Physical activity     Days per week: Not on file     Minutes per session: Not on file    Stress: Not on file   Relationships    Social connections     Talks on phone: Not on file     Gets together: Not on file     Attends [employer]ian[employer] service: Not on file     Active member of club or organization: Not on file     Attends meetings of clubs or organizations: Not on file     Relationship status: Not on file    Intimate partner violence     Fear of current or ex partner: Not on file     Emotionally abused: Not on file     Physically abused: Not on file     Forced sexual activity: Not on file   Other Topics Concern    Not on file   Social History Narrative    Not on file       Physical Examination:  /74   Pulse 80   Ht 5' 4.5\" (1.638 m)   Wt 154 lb (69.9 kg)   BMI 26.03 kg/m²   Physical Exam  Vitals signs reviewed. Constitutional:       Appearance: She is well-developed. Neck:      Vascular: No carotid bruit or JVD. Cardiovascular:      Rate and Rhythm: Normal rate and regular rhythm. Heart sounds: Normal heart sounds. No murmur. No friction rub. No gallop. Pulmonary:      Effort: Pulmonary effort is normal. No respiratory distress. Breath sounds: Normal breath sounds. No wheezing or rales. Abdominal:      General: There is no distension. Tenderness: There is no abdominal tenderness. Lymphadenopathy:      Cervical: No cervical adenopathy. Skin:     General: Skin is warm and dry. ASSESSMENT:     Diagnosis Orders   1. Essential hypertension     2. Coronary artery disease involving native coronary artery of native heart without angina pectoris     3. Mixed hyperlipidemia         PLAN:  No orders of the defined types were placed in this encounter. No orders of the defined types were placed in this encounter. 1. Continue present medications  2. Recommend follow-up assessment in 6 months  3. Encourage exercise    Return in about 6 months (around 2/27/2021) for return to Dr. Clementina Salgado only. Eboni Jernigan MD 8/27/2020 11:42 AM CDT    [facility]      Thisdictation was generated by voice recognition computer software. Although all attempts are made to edit the dictation for accuracy, there may be errors in the transcription that are not intended.

## 2020-09-28 ENCOUNTER — HOSPITAL ENCOUNTER (OUTPATIENT)
Dept: CT IMAGING | Age: 65
Discharge: HOME | End: 2020-09-28
Payer: MEDICARE

## 2020-09-28 ENCOUNTER — OFFICE VISIT (OUTPATIENT)
Dept: SURGERY | Age: 65
End: 2020-09-28
Payer: MEDICARE

## 2020-09-28 VITALS
WEIGHT: 150 LBS | BODY MASS INDEX: 24.99 KG/M2 | DIASTOLIC BLOOD PRESSURE: 72 MMHG | SYSTOLIC BLOOD PRESSURE: 124 MMHG | HEART RATE: 84 BPM | HEIGHT: 65 IN

## 2020-09-28 LAB
GFR AFRICAN AMERICAN: >60
GFR NON-AFRICAN AMERICAN: >60
PERFORMED ON: NORMAL
POC CREATININE: 0.8 MG/DL (ref 0.3–1.3)
POC SAMPLE TYPE: NORMAL

## 2020-09-28 PROCEDURE — 99213 OFFICE O/P EST LOW 20 MIN: CPT | Performed by: PHYSICIAN ASSISTANT

## 2020-09-28 PROCEDURE — 6360000004 HC RX CONTRAST MEDICATION: Performed by: PHYSICIAN ASSISTANT

## 2020-09-28 PROCEDURE — G8427 DOCREV CUR MEDS BY ELIG CLIN: HCPCS | Performed by: PHYSICIAN ASSISTANT

## 2020-09-28 PROCEDURE — 74177 CT ABD & PELVIS W/CONTRAST: CPT

## 2020-09-28 PROCEDURE — 3017F COLORECTAL CA SCREEN DOC REV: CPT | Performed by: PHYSICIAN ASSISTANT

## 2020-09-28 PROCEDURE — G8417 CALC BMI ABV UP PARAM F/U: HCPCS | Performed by: PHYSICIAN ASSISTANT

## 2020-09-28 PROCEDURE — 82565 ASSAY OF CREATININE: CPT

## 2020-09-28 PROCEDURE — 4004F PT TOBACCO SCREEN RCVD TLK: CPT | Performed by: PHYSICIAN ASSISTANT

## 2020-09-28 RX ORDER — ASPIRIN 81 MG/1
81 TABLET ORAL DAILY
COMMUNITY

## 2020-09-28 RX ADMIN — IOPAMIDOL 90 ML: 755 INJECTION, SOLUTION INTRAVENOUS at 10:59

## 2020-09-28 NOTE — PROGRESS NOTES
1 tablet by mouth 4 times daily. Angélica Austin aspirin 325 MG tablet Take 325 mg by mouth daily       No current facility-administered medications for this visit. Allergies: Hydrocodone and Sulfa antibiotics     Past Medical History:   Diagnosis Date    CAD (coronary artery disease)     Diabetes mellitus ([address]     GERD (gastroesophageal reflux disease)     History of blood transfusion     HTN (hypertension)     Hyperlipidemia     Smoker     Thyroid disease      Past Surgical History:   Procedure Laterality Date    CARDIAC CATHETERIZATION  12/3/14  MDL    stent to LAD. EF 60%     SECTION      COLONOSCOPY      HERNIA REPAIR      umbilical    THYROID SURGERY      partial    TONSILLECTOMY AND ADENOIDECTOMY       Family History   Problem Relation Age of Onset    Heart Attack Father     Diabetes Maternal Grandmother     Diabetes Paternal Grandmother      Social History     Tobacco Use    Smoking status: Current Every Day Smoker     Packs/day: 1.00     Types: Cigarettes    Smokeless tobacco: Never Used   Substance Use Topics    Alcohol use: No     Cardiology:  Patient has been wrist stratified by her cardiologist and patient is noted to be low risk. REVIEW OF SYSTEMS:  Review of systems have been reviewed and noted above. All other review of systems are negative. PHYSICAL EXAMINATION:  /72 (Site: Left Upper Arm, Position: Sitting, Cuff Size: Medium Adult)   Pulse 84   Ht 5' 4.5\" (1.638 m)   Wt 150 lb (68 kg)   BMI 25.35 kg/m²        Constitutional:  25-year-old female who appears to be in no acute distress. HEENT head normocephalic atraumatic  No evidence of carotid bruits    Heart:  Regular rate and rhythm no murmurs or gallops    Lungs:  Breath sounds are distant. No wheezes or rhonchi    Abdominal exam reveals a epigastric hernia probably 4 cm in width. There is no evidence of strangulation. It reduces easily. There is mild pain to palpation.     Extremities no clubbing cyanosis appreciable      Impression:  Epigastric hernia with no evidence strangulation or incarceration  Coronary artery disease last scan showing a normal ejection fraction of 72%    PLAN:  Risk benefits and alternatives of surgery have been discussed with the patient. Patient wishes to proceed with surgery the first week in December. I will plan to see her back in mid November to discuss surgery again. She will continue to wear her abdominal binder at this time and notify us of any changes in her symptoms.

## 2020-11-16 ENCOUNTER — OFFICE VISIT (OUTPATIENT)
Dept: SURGERY | Age: 65
End: 2020-11-16
Payer: MEDICARE

## 2020-11-16 VITALS
SYSTOLIC BLOOD PRESSURE: 120 MMHG | HEIGHT: 64 IN | BODY MASS INDEX: 24.69 KG/M2 | WEIGHT: 144.6 LBS | TEMPERATURE: 97.8 F | DIASTOLIC BLOOD PRESSURE: 70 MMHG

## 2020-11-16 PROCEDURE — 3017F COLORECTAL CA SCREEN DOC REV: CPT | Performed by: PHYSICIAN ASSISTANT

## 2020-11-16 PROCEDURE — G8420 CALC BMI NORM PARAMETERS: HCPCS | Performed by: PHYSICIAN ASSISTANT

## 2020-11-16 PROCEDURE — G8427 DOCREV CUR MEDS BY ELIG CLIN: HCPCS | Performed by: PHYSICIAN ASSISTANT

## 2020-11-16 PROCEDURE — G8484 FLU IMMUNIZE NO ADMIN: HCPCS | Performed by: PHYSICIAN ASSISTANT

## 2020-11-16 PROCEDURE — 4004F PT TOBACCO SCREEN RCVD TLK: CPT | Performed by: PHYSICIAN ASSISTANT

## 2020-11-16 PROCEDURE — 99212 OFFICE O/P EST SF 10 MIN: CPT | Performed by: PHYSICIAN ASSISTANT

## 2020-11-16 NOTE — LETTER
Preop Orders:     Patient: Aneesh Kwong  : 1955    Hospital:  [facility]        Admitting Physician:            Diagnosis:   Ventral Hernia (Epigastric)    Procedure:  Ventral Hernia Repair With Mesh    Anesthesia: General    Admission:Outpatient    Date:12/15/20    Labs:per anesthesia    Pre-Op Meds:  Kefzol 2grm IV    Latex Allergy: no    Beta Blocker: yes - patient advised to take    Medication Instructions: No plavix for 2 weeks prior to procedure; no asprin for 3 days prior to procedure    This has been electronically signed by Pippa Wright M.D.

## 2020-11-30 NOTE — PROGRESS NOTES
HISTORY OF PRESENT ILLNESS:  James Marieutant comes today to discuss surgery for an epigastric hernia. The hernia has been present for several years. CT has demonstrated a midline epigastric hernia with some transverse colon protrusion. There is no evidence of strangulation or incarceration. Patient Active Problem List    Diagnosis Date Noted    Angina pectoris, jorge ([facility] [address] 06/12/2018    Tobacco abuse     Hypotension 01/14/2015    CAD (coronary artery disease)     HTN (hypertension)     Hyperlipidemia     Diabetes mellitus (HCC)      Current Outpatient Medications   Medication Sig Dispense Refill    mupirocin (BACTROBAN) 2 % ointment Apply to each nostril BID x 5 days prior to surgery.  1 Tube 0    aspirin 81 MG EC tablet Take 81 mg by mouth daily      Empagliflozin (JARDIANCE PO) Take by mouth      isosorbide mononitrate (IMDUR) 30 MG extended release tablet Take 1 tablet by mouth daily 30 tablet 5    citalopram (CELEXA) 20 MG tablet Take 20 mg by mouth daily      pantoprazole (PROTONIX) 20 MG tablet Take 20 mg by mouth daily      furosemide (LASIX) 40 MG tablet Take 40 mg by mouth daily      rosuvastatin (CRESTOR) 10 MG tablet Take 1 tablet by mouth daily 30 tablet 0    glipiZIDE (GLUCOTROL) 10 MG tablet Take 10 mg by mouth 2 times daily (before meals)      nitroGLYCERIN (NITROSTAT) 0.4 MG SL tablet Place 1 tablet under the tongue every 5 minutes as needed for Chest pain 25 tablet 3    metFORMIN (GLUCOPHAGE) 1000 MG tablet Take 1 tablet by mouth 2 times daily (with meals) 60 tablet 3    amitriptyline (ELAVIL) 100 MG tablet Take 100 mg by mouth nightly      FENOFIBRATE PO Take 72 mg by mouth daily      SITagliptin (JANUVIA) 100 MG tablet Take 100 mg by mouth daily      oxyCODONE-acetaminophen (PERCOCET)  MG per tablet Take 1 tablet by mouth 3 times daily      aspirin 325 MG tablet Take 325 mg by mouth daily      metoprolol (LOPRESSOR) 50 MG tablet Take 1 tablet by mouth 2 times daily 60 tablet 0    gabapentin (NEURONTIN) 600 MG tablet Take 1 tablet by mouth 4 times daily. .       No current facility-administered medications for this visit. Allergies: Hydrocodone and Sulfa antibiotics     Past Medical History:   Diagnosis Date    CAD (coronary artery disease)     Diabetes mellitus ([address]     GERD (gastroesophageal reflux disease)     History of blood transfusion     HTN (hypertension)     Hyperlipidemia     Smoker     Thyroid disease      Past Surgical History:   Procedure Laterality Date    CARDIAC CATHETERIZATION  12/3/14  MDL    stent to LAD. EF 60%     SECTION      COLONOSCOPY      HERNIA REPAIR      umbilical    THYROID SURGERY      partial    TONSILLECTOMY AND ADENOIDECTOMY       Family History   Problem Relation Age of Onset    Heart Attack Father     Diabetes Maternal Grandmother     Diabetes Paternal Grandmother      Social History     Tobacco Use    Smoking status: Current Every Day Smoker     Packs/day: 1.00     Types: Cigarettes    Smokeless tobacco: Never Used   Substance Use Topics    Alcohol use: No     Cardiology:  Patient has been risk stratified by her cardiologist and patient is noted to be low risk. REVIEW OF SYSTEMS:  Review of systems have been reviewed and noted above. All other review of systems are negative. PHYSICAL EXAMINATION:  /70 (Site: Right Upper Arm, Position: Sitting, Cuff Size: Medium Adult)   Temp 97.8 °F (36.6 °C) (Temporal)   Ht 5' 4\" (1.626 m)   Wt 144 lb 9.6 oz (65.6 kg)   BMI 24.82 kg/m²        Constitutional:  81-year-old female who appears to be in no acute distress. HEENT head normocephalic atraumatic  No evidence of carotid bruits    Heart:  Regular rate and rhythm no murmurs or gallops    Lungs:  Breath sounds are distant. No wheezes or rhonchi    Abdominal exam reveals a epigastric hernia probably 4 cm in width. There is no evidence of strangulation. It reduces easily.   There is mild

## 2020-12-10 ENCOUNTER — HOSPITAL ENCOUNTER (OUTPATIENT)
Dept: PREADMISSION TESTING | Age: 65
Discharge: HOME | End: 2020-12-10

## 2021-04-01 ENCOUNTER — OFFICE VISIT (OUTPATIENT)
Dept: CARDIOLOGY CLINIC | Age: 66
End: 2021-04-01
Payer: MEDICARE

## 2021-04-01 VITALS
OXYGEN SATURATION: 94 % | WEIGHT: 140 LBS | BODY MASS INDEX: 23.9 KG/M2 | HEART RATE: 69 BPM | DIASTOLIC BLOOD PRESSURE: 72 MMHG | HEIGHT: 64 IN | SYSTOLIC BLOOD PRESSURE: 122 MMHG

## 2021-04-01 DIAGNOSIS — I20.0 ANGINA PECTORIS, CRESCENDO (HCC): ICD-10-CM

## 2021-04-01 DIAGNOSIS — E78.2 MIXED HYPERLIPIDEMIA: ICD-10-CM

## 2021-04-01 DIAGNOSIS — I25.10 CORONARY ARTERY DISEASE INVOLVING NATIVE CORONARY ARTERY OF NATIVE HEART WITHOUT ANGINA PECTORIS: Primary | ICD-10-CM

## 2021-04-01 DIAGNOSIS — I10 ESSENTIAL HYPERTENSION: ICD-10-CM

## 2021-04-01 PROCEDURE — G8400 PT W/DXA NO RESULTS DOC: HCPCS | Performed by: INTERNAL MEDICINE

## 2021-04-01 PROCEDURE — G8420 CALC BMI NORM PARAMETERS: HCPCS | Performed by: INTERNAL MEDICINE

## 2021-04-01 PROCEDURE — 93000 ELECTROCARDIOGRAM COMPLETE: CPT | Performed by: INTERNAL MEDICINE

## 2021-04-01 PROCEDURE — 1090F PRES/ABSN URINE INCON ASSESS: CPT | Performed by: INTERNAL MEDICINE

## 2021-04-01 PROCEDURE — 3017F COLORECTAL CA SCREEN DOC REV: CPT | Performed by: INTERNAL MEDICINE

## 2021-04-01 PROCEDURE — 4004F PT TOBACCO SCREEN RCVD TLK: CPT | Performed by: INTERNAL MEDICINE

## 2021-04-01 PROCEDURE — 99214 OFFICE O/P EST MOD 30 MIN: CPT | Performed by: INTERNAL MEDICINE

## 2021-04-01 PROCEDURE — 1123F ACP DISCUSS/DSCN MKR DOCD: CPT | Performed by: INTERNAL MEDICINE

## 2021-04-01 PROCEDURE — 4040F PNEUMOC VAC/ADMIN/RCVD: CPT | Performed by: INTERNAL MEDICINE

## 2021-04-01 PROCEDURE — G8427 DOCREV CUR MEDS BY ELIG CLIN: HCPCS | Performed by: INTERNAL MEDICINE

## 2021-04-01 ASSESSMENT — ENCOUNTER SYMPTOMS
RESPIRATORY NEGATIVE: 1
SHORTNESS OF BREATH: 0
VOMITING: 0
GASTROINTESTINAL NEGATIVE: 1
EYES NEGATIVE: 1
NAUSEA: 0
DIARRHEA: 0

## 2021-04-01 NOTE — PROGRESS NOTES
[facility] Progress Note                            Date:  4/1/2021  Patient: Sha Nails  Age:  73 y. o., 1955      Reason for evaluation:         SUBJECTIVE:    Returns today follow-up assessment overall doing well history of coronary artery disease denies anginal chest pain denies dyspnea still smoking. Denies claudication. Blood pressure 122/72 heart 69. Review of Systems   Constitutional: Negative. Negative for chills, fever and unexpected weight change. HENT: Negative. Eyes: Negative. Respiratory: Negative. Negative for shortness of breath. Cardiovascular: Negative. Negative for chest pain. Gastrointestinal: Negative. Negative for diarrhea, nausea and vomiting. Endocrine: Negative. Genitourinary: Negative. Musculoskeletal: Negative. Skin: Negative. Neurological: Negative. All other systems reviewed and are negative. OBJECTIVE:     /72   Pulse 69   Ht 5' 4\" (1.626 m)   Wt 140 lb (63.5 kg)   SpO2 94%   BMI 24.03 kg/m²     Labs:   CBC: No results for input(s): WBC, HGB, HCT, PLT in the last 72 hours. BMP:No results for input(s): NA, K, CO2, BUN, CREATININE, LABGLOM, GLUCOSE in the last 72 hours. BNP: No results for input(s): BNP in the last 72 hours. PT/INR: No results for input(s): PROTIME, INR in the last 72 hours. APTT:No results for input(s): APTT in the last 72 hours. CARDIAC ENZYMES:No results for input(s): CKTOTAL, CKMB, CKMBINDEX, TROPONINI in the last 72 hours. FASTING LIPID PANEL:  Lab Results   Component Value Date    HDL 30 12/03/2014    LDLDIRECT 109 12/03/2014    TRIG 496 12/03/2014     LIVER PROFILE:No results for input(s): AST, ALT, LABALBU in the last 72 hours.         Past Medical History:   Diagnosis Date    CAD (coronary artery disease)     Diabetes mellitus ([address]     GERD (gastroesophageal reflux disease)     History of blood transfusion     HTN (hypertension)     Hyperlipidemia     Smoker     Thyroid disease      Past Surgical History:   Procedure Laterality Date    CARDIAC CATHETERIZATION  12/3/14  MDL    stent to LAD. EF 60%     SECTION      COLONOSCOPY      HERNIA REPAIR      umbilical    THYROID SURGERY      partial    TONSILLECTOMY AND ADENOIDECTOMY       Family History   Problem Relation Age of Onset    Heart Attack Father     Diabetes Maternal Grandmother     Diabetes Paternal Grandmother      Allergies   Allergen Reactions    Hydrocodone     Sulfa Antibiotics      Current Outpatient Medications   Medication Sig Dispense Refill    mupirocin (BACTROBAN) 2 % ointment Apply to each nostril BID x 5 days prior to surgery.  1 Tube 0    aspirin 81 MG EC tablet Take 81 mg by mouth daily      Empagliflozin (JARDIANCE PO) Take by mouth      isosorbide mononitrate (IMDUR) 30 MG extended release tablet Take 1 tablet by mouth daily 30 tablet 5    citalopram (CELEXA) 20 MG tablet Take 20 mg by mouth daily      pantoprazole (PROTONIX) 20 MG tablet Take 20 mg by mouth daily      furosemide (LASIX) 40 MG tablet Take 40 mg by mouth daily      rosuvastatin (CRESTOR) 10 MG tablet Take 1 tablet by mouth daily 30 tablet 0    glipiZIDE (GLUCOTROL) 10 MG tablet Take 10 mg by mouth 2 times daily (before meals)      nitroGLYCERIN (NITROSTAT) 0.4 MG SL tablet Place 1 tablet under the tongue every 5 minutes as needed for Chest pain 25 tablet 3    metFORMIN (GLUCOPHAGE) 1000 MG tablet Take 1 tablet by mouth 2 times daily (with meals) 60 tablet 3    amitriptyline (ELAVIL) 100 MG tablet Take 100 mg by mouth nightly      FENOFIBRATE PO Take 72 mg by mouth daily      SITagliptin (JANUVIA) 100 MG tablet Take 100 mg by mouth daily      oxyCODONE-acetaminophen (PERCOCET)  MG per tablet Take 1 tablet by mouth 3 times daily      aspirin 325 MG tablet Take 325 mg by mouth daily      metoprolol (LOPRESSOR) 50 MG tablet Take 1 tablet by mouth 2 times daily 60 tablet 0    gabapentin (NEURONTIN) 600 MG tablet Take 1 tablet by mouth 4 times daily. .       No current facility-administered medications for this visit. Social History     Socioeconomic History    Marital status:      Spouse name: Not on file    Number of children: Not on file    Years of education: Not on file    Highest education level: Not on file   Occupational History    Not on file   Social Needs    Financial resource strain: Not on file    Food insecurity     Worry: Not on file     Inability: Not on file    Transportation needs     Medical: Not on file     Non-medical: Not on file   Tobacco Use    Smoking status: Current Every Day Smoker     Packs/day: 1.00     Types: Cigarettes    Smokeless tobacco: Never Used   Substance and Sexual Activity    Alcohol use: No    Drug use: No    Sexual activity: Not on file   Lifestyle    Physical activity     Days per week: Not on file     Minutes per session: Not on file    Stress: Not on file   Relationships    Social connections     Talks on phone: Not on file     Gets together: Not on file     Attends [employer] service: Not on file     Active member of club or organization: Not on file     Attends meetings of clubs or organizations: Not on file     Relationship status: Not on file    Intimate partner violence     Fear of current or ex partner: Not on file     Emotionally abused: Not on file     Physically abused: Not on file     Forced sexual activity: Not on file   Other Topics Concern    Not on file   Social History Narrative    Not on file       Physical Examination:  /72   Pulse 69   Ht 5' 4\" (1.626 m)   Wt 140 lb (63.5 kg)   SpO2 94%   BMI 24.03 kg/m²   Physical Exam  Vitals signs reviewed. Constitutional:       Appearance: She is well-developed. Neck:      Vascular: No carotid bruit or JVD. Cardiovascular:      Rate and Rhythm: Normal rate and regular rhythm. Heart sounds: Normal heart sounds. No murmur. No friction rub. No gallop.

## 2021-10-04 ENCOUNTER — OFFICE VISIT (OUTPATIENT)
Dept: CARDIOLOGY CLINIC | Age: 66
End: 2021-10-04
Payer: MEDICARE

## 2021-10-04 VITALS
DIASTOLIC BLOOD PRESSURE: 80 MMHG | WEIGHT: 139 LBS | SYSTOLIC BLOOD PRESSURE: 148 MMHG | HEIGHT: 65 IN | BODY MASS INDEX: 23.16 KG/M2 | HEART RATE: 76 BPM

## 2021-10-04 DIAGNOSIS — E78.2 MIXED HYPERLIPIDEMIA: ICD-10-CM

## 2021-10-04 DIAGNOSIS — I25.10 CORONARY ARTERY DISEASE INVOLVING NATIVE CORONARY ARTERY OF NATIVE HEART WITHOUT ANGINA PECTORIS: Primary | ICD-10-CM

## 2021-10-04 DIAGNOSIS — I10 PRIMARY HYPERTENSION: ICD-10-CM

## 2021-10-04 PROCEDURE — 4040F PNEUMOC VAC/ADMIN/RCVD: CPT | Performed by: INTERNAL MEDICINE

## 2021-10-04 PROCEDURE — G8484 FLU IMMUNIZE NO ADMIN: HCPCS | Performed by: INTERNAL MEDICINE

## 2021-10-04 PROCEDURE — G8420 CALC BMI NORM PARAMETERS: HCPCS | Performed by: INTERNAL MEDICINE

## 2021-10-04 PROCEDURE — 1090F PRES/ABSN URINE INCON ASSESS: CPT | Performed by: INTERNAL MEDICINE

## 2021-10-04 PROCEDURE — 4004F PT TOBACCO SCREEN RCVD TLK: CPT | Performed by: INTERNAL MEDICINE

## 2021-10-04 PROCEDURE — 3017F COLORECTAL CA SCREEN DOC REV: CPT | Performed by: INTERNAL MEDICINE

## 2021-10-04 PROCEDURE — 99213 OFFICE O/P EST LOW 20 MIN: CPT | Performed by: INTERNAL MEDICINE

## 2021-10-04 PROCEDURE — G8400 PT W/DXA NO RESULTS DOC: HCPCS | Performed by: INTERNAL MEDICINE

## 2021-10-04 PROCEDURE — G8427 DOCREV CUR MEDS BY ELIG CLIN: HCPCS | Performed by: INTERNAL MEDICINE

## 2021-10-04 PROCEDURE — 1123F ACP DISCUSS/DSCN MKR DOCD: CPT | Performed by: INTERNAL MEDICINE

## 2021-10-04 ASSESSMENT — ENCOUNTER SYMPTOMS
NAUSEA: 0
EYES NEGATIVE: 1
SHORTNESS OF BREATH: 0
DIARRHEA: 0
RESPIRATORY NEGATIVE: 1
VOMITING: 0
GASTROINTESTINAL NEGATIVE: 1

## 2021-10-04 NOTE — PROGRESS NOTES
[facility] Progress Note                            Date:  10/4/2021  Patient: Sumanth Henry  Age:  73 y. o., 1955      Reason for evaluation:         SUBJECTIVE:    Returns today follow-up assessment for coronary artery disease hypertension hyperlipidemia. Continues to smoke about a pack per day. Denies chest discomfort or dyspnea no other complaints or issues reported. States she had a lipid profile primary care office 3 months ago. Lexiscan 2/20/2020 ejection fraction 72% normal perfusion study. Blood pressure today 148/80 heart 76. Review of Systems   Constitutional: Negative. Negative for chills, fever and unexpected weight change. HENT: Negative. Eyes: Negative. Respiratory: Negative. Negative for shortness of breath. Cardiovascular: Negative. Negative for chest pain. Gastrointestinal: Negative. Negative for diarrhea, nausea and vomiting. Endocrine: Negative. Genitourinary: Negative. Musculoskeletal: Negative. Skin: Negative. Neurological: Negative. All other systems reviewed and are negative. OBJECTIVE:     BP (!) 148/80   Pulse 76   Ht 5' 4.5\" (1.638 m)   Wt 139 lb (63 kg)   BMI 23.49 kg/m²     Labs:   CBC: No results for input(s): WBC, HGB, HCT, PLT in the last 72 hours. BMP:No results for input(s): NA, K, CO2, BUN, CREATININE, LABGLOM, GLUCOSE in the last 72 hours. BNP: No results for input(s): BNP in the last 72 hours. PT/INR: No results for input(s): PROTIME, INR in the last 72 hours. APTT:No results for input(s): APTT in the last 72 hours. CARDIAC ENZYMES:No results for input(s): CKTOTAL, CKMB, CKMBINDEX, TROPONINI in the last 72 hours. FASTING LIPID PANEL:  Lab Results   Component Value Date    HDL 30 12/03/2014    LDLDIRECT 109 12/03/2014    TRIG 496 12/03/2014     LIVER PROFILE:No results for input(s): AST, ALT, LABALBU in the last 72 hours.         Past Medical History:   Diagnosis Date    CAD (coronary artery disease)  Diabetes mellitus ([address]     GERD (gastroesophageal reflux disease)     History of blood transfusion     HTN (hypertension)     Hyperlipidemia     Smoker     Thyroid disease      Past Surgical History:   Procedure Laterality Date    CARDIAC CATHETERIZATION  12/3/14  MDL    stent to LAD. EF 60%     SECTION      COLONOSCOPY      HERNIA REPAIR      umbilical    THYROID SURGERY      partial    TONSILLECTOMY AND ADENOIDECTOMY       Family History   Problem Relation Age of Onset    Heart Attack Father     Diabetes Maternal Grandmother     Diabetes Paternal Grandmother      Allergies   Allergen Reactions    Hydrocodone     Sulfa Antibiotics      Current Outpatient Medications   Medication Sig Dispense Refill    mupirocin (BACTROBAN) 2 % ointment Apply to each nostril BID x 5 days prior to surgery.  1 Tube 0    aspirin 81 MG EC tablet Take 81 mg by mouth daily      Empagliflozin (JARDIANCE PO) Take by mouth      isosorbide mononitrate (IMDUR) 30 MG extended release tablet Take 1 tablet by mouth daily 30 tablet 5    citalopram (CELEXA) 20 MG tablet Take 20 mg by mouth daily      pantoprazole (PROTONIX) 20 MG tablet Take 20 mg by mouth daily      furosemide (LASIX) 40 MG tablet Take 40 mg by mouth daily      rosuvastatin (CRESTOR) 10 MG tablet Take 1 tablet by mouth daily 30 tablet 0    glipiZIDE (GLUCOTROL) 10 MG tablet Take 10 mg by mouth 2 times daily (before meals)      nitroGLYCERIN (NITROSTAT) 0.4 MG SL tablet Place 1 tablet under the tongue every 5 minutes as needed for Chest pain 25 tablet 3    metFORMIN (GLUCOPHAGE) 1000 MG tablet Take 1 tablet by mouth 2 times daily (with meals) 60 tablet 3    amitriptyline (ELAVIL) 100 MG tablet Take 100 mg by mouth nightly      FENOFIBRATE PO Take 72 mg by mouth daily      oxyCODONE-acetaminophen (PERCOCET)  MG per tablet Take 1 tablet by mouth 3 times daily      aspirin 325 MG tablet Take 325 mg by mouth daily      metoprolol (LOPRESSOR) 50 MG tablet Take 1 tablet by mouth 2 times daily 60 tablet 0    gabapentin (NEURONTIN) 600 MG tablet Take 1 tablet by mouth 4 times daily. .      SITagliptin (JANUVIA) 100 MG tablet Take 100 mg by mouth daily (Patient not taking: Reported on 10/4/2021)       No current facility-administered medications for this visit. Social History     Socioeconomic History    Marital status:      Spouse name: Not on file    Number of children: Not on file    Years of education: Not on file    Highest education level: Not on file   Occupational History    Not on file   Tobacco Use    Smoking status: Current Every Day Smoker     Packs/day: 1.00     Types: Cigarettes    Smokeless tobacco: Never Used   Vaping Use    Vaping Use: Never used   Substance and Sexual Activity    Alcohol use: No    Drug use: No    Sexual activity: Not on file   Other Topics Concern    Not on file   Social History Narrative    Not on file     Social Determinants of Health     Financial Resource Strain:     Difficulty of Paying Living Expenses:    Food Insecurity:     Worried About Running Out of Food in the Last Year:     [address] in the Last Year:    Transportation Needs:     Lack of Transportation (Medical):      Lack of Transportation (Non-Medical):    Physical Activity:     Days of Exercise per Week:     Minutes of Exercise per Session:    Stress:     Feeling of Stress :    Social Connections:     Frequency of Communication with Friends and Family:     Frequency of Social Gatherings with Friends and Family:     Attends [employer] Services:     Active Member of Clubs or Organizations:     Attends Club or Organization Meetings:     Marital Status:    Intimate Partner Violence:     Fear of Current or Ex-Partner:     Emotionally Abused:     Physically Abused:     Sexually Abused:        Physical Examination:  BP (!) 148/80   Pulse 76   Ht 5' 4.5\" (1.638 m)   Wt 139 lb (63 kg)   BMI 23.49 kg/m² Physical Exam  Vitals reviewed. Constitutional:       Appearance: She is well-developed. Neck:      Vascular: No carotid bruit or JVD. Cardiovascular:      Rate and Rhythm: Normal rate and regular rhythm. Heart sounds: Normal heart sounds. No murmur heard. No friction rub. No gallop. Pulmonary:      Effort: Pulmonary effort is normal. No respiratory distress. Breath sounds: Normal breath sounds. No wheezing or rales. Abdominal:      General: There is no distension. Tenderness: There is no abdominal tenderness. Lymphadenopathy:      Cervical: No cervical adenopathy. Skin:     General: Skin is warm and dry. ASSESSMENT:     Diagnosis Orders   1. Coronary artery disease involving native coronary artery of native heart without angina pectoris     2. Primary hypertension     3. Mixed hyperlipidemia         PLAN:  No orders of the defined types were placed in this encounter. No orders of the defined types were placed in this encounter. 1. Continue present medications  2. Recommend follow-up assessment in 6 months    Return in about 6 months (around 4/4/2022) for return to Dr. Adeline Foster only. Angus Haskins MD 10/4/2021 3:30 PM CDT    [facility]      Thisdictation was generated by voice recognition computer software. Although all attempts are made to edit the dictation for accuracy, there may be errors in the transcription that are not intended.

## 2021-12-30 ENCOUNTER — HOSPITAL ENCOUNTER (OUTPATIENT)
Dept: NEUROLOGY | Age: 66
Discharge: HOME | End: 2021-12-30
Payer: MEDICARE

## 2021-12-30 PROCEDURE — 95909 NRV CNDJ TST 5-6 STUDIES: CPT

## 2021-12-30 PROCEDURE — 95909 NRV CNDJ TST 5-6 STUDIES: CPT | Performed by: PSYCHIATRY & NEUROLOGY

## 2022-04-01 ENCOUNTER — TELEPHONE (OUTPATIENT)
Dept: CARDIOLOGY CLINIC | Age: 67
End: 2022-04-01

## 2022-08-17 ENCOUNTER — TELEPHONE (OUTPATIENT)
Dept: SURGERY | Age: 67
End: 2022-08-17

## 2022-08-17 NOTE — TELEPHONE ENCOUNTER
Tami Rose requests that office  return their call. The best time to reach her is Anytime. Patient said she  had a missed called to scheduled a procedure. Please called the patient [phone]    Thank you.

## 2022-08-22 ENCOUNTER — OFFICE VISIT (OUTPATIENT)
Dept: SURGERY | Age: 67
End: 2022-08-22
Payer: MEDICARE

## 2022-08-22 VITALS
DIASTOLIC BLOOD PRESSURE: 86 MMHG | BODY MASS INDEX: 21.85 KG/M2 | WEIGHT: 128 LBS | TEMPERATURE: 97.8 F | SYSTOLIC BLOOD PRESSURE: 136 MMHG | HEIGHT: 64 IN

## 2022-08-22 DIAGNOSIS — R92.8 ABNORMAL MAMMOGRAM: Primary | ICD-10-CM

## 2022-08-22 PROCEDURE — 1123F ACP DISCUSS/DSCN MKR DOCD: CPT | Performed by: PHYSICIAN ASSISTANT

## 2022-08-22 PROCEDURE — G8427 DOCREV CUR MEDS BY ELIG CLIN: HCPCS | Performed by: PHYSICIAN ASSISTANT

## 2022-08-22 PROCEDURE — 1090F PRES/ABSN URINE INCON ASSESS: CPT | Performed by: PHYSICIAN ASSISTANT

## 2022-08-22 PROCEDURE — 3017F COLORECTAL CA SCREEN DOC REV: CPT | Performed by: PHYSICIAN ASSISTANT

## 2022-08-22 PROCEDURE — 99203 OFFICE O/P NEW LOW 30 MIN: CPT | Performed by: PHYSICIAN ASSISTANT

## 2022-08-22 PROCEDURE — G8420 CALC BMI NORM PARAMETERS: HCPCS | Performed by: PHYSICIAN ASSISTANT

## 2022-08-22 PROCEDURE — G8400 PT W/DXA NO RESULTS DOC: HCPCS | Performed by: PHYSICIAN ASSISTANT

## 2022-08-22 PROCEDURE — 4004F PT TOBACCO SCREEN RCVD TLK: CPT | Performed by: PHYSICIAN ASSISTANT

## 2022-08-22 RX ORDER — DULAGLUTIDE 0.75 MG/.5ML
INJECTION, SOLUTION SUBCUTANEOUS
COMMUNITY
Start: 2022-07-30

## 2022-08-22 RX ORDER — FENOFIBRATE 145 MG/1
TABLET, COATED ORAL
COMMUNITY
Start: 2022-06-20

## 2022-09-02 NOTE — PROGRESS NOTES
Subjective:      Patient ID: Adeline Moore is a 78 y.o. female. HPI  Ms. Franco Covert presents to establish care for an abnormal mammogram and US left breast demonstrating a mass. Biopsy is recommended. Adeline Moore is a 78 y.o. female with the following history as recorded in [facility]:  Patient Active Problem List    Diagnosis Date Noted    Numbness     Angina pectoris, crescendo ([address] 06/12/2018    Tobacco abuse     Hypotension 01/14/2015    CAD (coronary artery disease)     HTN (hypertension)     Hyperlipidemia     Diabetes mellitus (HCC)      Current Outpatient Medications   Medication Sig Dispense Refill    TRULICITY 8.29 VX/1.2IH SOPN       fenofibrate (TRICOR) 145 MG tablet       mupirocin (BACTROBAN) 2 % ointment Apply to each nostril BID x 5 days prior to surgery. 1 Tube 0    aspirin 81 MG EC tablet Take 81 mg by mouth daily      pantoprazole (PROTONIX) 20 MG tablet Take 20 mg by mouth daily      furosemide (LASIX) 40 MG tablet Take 40 mg by mouth daily      rosuvastatin (CRESTOR) 10 MG tablet Take 1 tablet by mouth daily 30 tablet 0    metFORMIN (GLUCOPHAGE) 1000 MG tablet Take 1 tablet by mouth 2 times daily (with meals) 60 tablet 3    amitriptyline (ELAVIL) 100 MG tablet Take 100 mg by mouth nightly      oxyCODONE-acetaminophen (PERCOCET)  MG per tablet Take 1 tablet by mouth 3 times daily      metoprolol (LOPRESSOR) 50 MG tablet Take 1 tablet by mouth 2 times daily 60 tablet 0    gabapentin (NEURONTIN) 600 MG tablet Take 1 tablet by mouth 4 times daily.  .      Empagliflozin (JARDIANCE PO) Take by mouth (Patient not taking: Reported on 8/22/2022)      isosorbide mononitrate (IMDUR) 30 MG extended release tablet Take 1 tablet by mouth daily (Patient not taking: Reported on 8/22/2022) 30 tablet 5    citalopram (CELEXA) 20 MG tablet Take 20 mg by mouth daily (Patient not taking: Reported on 8/22/2022)      glipiZIDE (GLUCOTROL) 10 MG tablet Take 10 mg by mouth 2 times daily (before meals) (Patient not taking: Reported on 2022)      nitroGLYCERIN (NITROSTAT) 0.4 MG SL tablet Place 1 tablet under the tongue every 5 minutes as needed for Chest pain (Patient not taking: Reported on 2022) 25 tablet 3    FENOFIBRATE PO Take 72 mg by mouth daily (Patient not taking: Reported on 2022)      SITagliptin (JANUVIA) 100 MG tablet Take 100 mg by mouth daily (Patient not taking: Reported on 2022)      aspirin 325 MG tablet Take 325 mg by mouth daily (Patient not taking: Reported on 2022)       No current facility-administered medications for this visit. Allergies: Hydrocodone and Sulfa antibiotics  Past Medical History:   Diagnosis Date    CAD (coronary artery disease)     Diabetes mellitus ([address]     GERD (gastroesophageal reflux disease)     History of blood transfusion     HTN (hypertension)     Hyperlipidemia     Smoker     Thyroid disease      Past Surgical History:   Procedure Laterality Date    CARDIAC CATHETERIZATION  12/3/14  MDL    stent to LAD. EF 60%     SECTION      COLONOSCOPY      HERNIA REPAIR      umbilical    THYROID SURGERY      partial    TONSILLECTOMY AND ADENOIDECTOMY       Family History   Problem Relation Age of Onset    Heart Attack Father     Diabetes Maternal Grandmother     Diabetes Paternal Grandmother     Breast Cancer Paternal Aunt 36    Cervical Cancer Paternal Aunt 34     Social History     Tobacco Use    Smoking status: Every Day     Packs/day: 1.00     Types: Cigarettes    Smokeless tobacco: Never   Substance Use Topics    Alcohol use: No       Review of Systems   All other systems reviewed and are negative. Objective:   Physical Exam  Constitutional:       Appearance: Normal appearance. Chest:   Breasts:     Right: Normal. No inverted nipple, mass or skin change. Left: Mass present. No inverted nipple or skin change. Skin:     General: Skin is warm and dry. Neurological:      General: No focal deficit present.       Mental Status: She is alert and oriented to person, place, and time. Psychiatric:         Mood and Affect: Mood normal.         Behavior: Behavior normal.         Thought Content: Thought content normal.         Judgment: Judgment normal.       Assessment:       Diagnosis Orders   1. Abnormal mammogram  US BREAST BIOPSY W LOC DEVICE 1ST LESION LEFT              Plan:      PLAN:  This will require US guided mammotome biopsy, which will be done under local anesthesia. Further procedures will be done as indicated. CONSENT:  The risks, benefits and options of biopsy/US were discussed with her including but not limited to bleeding, infection, hematoma, missing the lesion, and scarring. She expresses good understanding and is agreeable to proceed.           Richard Sims PA-C

## 2022-09-07 ENCOUNTER — TELEPHONE (OUTPATIENT)
Dept: SURGERY | Age: 67
End: 2022-09-07

## 2022-09-26 ENCOUNTER — HOSPITAL ENCOUNTER (OUTPATIENT)
Dept: WOMENS IMAGING | Age: 67
Discharge: HOME | End: 2022-09-26
Payer: MEDICARE

## 2022-09-26 ENCOUNTER — PROCEDURE VISIT (OUTPATIENT)
Dept: SURGERY | Age: 67
End: 2022-09-26
Payer: MEDICARE

## 2022-09-26 DIAGNOSIS — R92.8 ABNORMAL MAMMOGRAM: ICD-10-CM

## 2022-09-26 DIAGNOSIS — N63.20 LEFT BREAST MASS: Primary | ICD-10-CM

## 2022-09-26 PROCEDURE — 19083 BX BREAST 1ST LESION US IMAG: CPT | Performed by: SURGERY

## 2022-09-26 PROCEDURE — 77065 DX MAMMO INCL CAD UNI: CPT

## 2022-09-27 ENCOUNTER — TELEPHONE (OUTPATIENT)
Dept: SURGERY | Age: 67
End: 2022-09-27

## 2022-09-27 DIAGNOSIS — C50.812 MALIGNANT NEOPLASM OF OVERLAPPING SITES OF LEFT BREAST (HCC): Primary | ICD-10-CM

## 2022-09-27 NOTE — TELEPHONE ENCOUNTER
Gave results. Please cancel follow up appt with me and schedule MRI and breast talk with Dr. Otilia Walter. Orders placed.

## 2022-09-28 ENCOUNTER — TELEPHONE (OUTPATIENT)
Dept: OTHER | Age: 67
End: 2022-09-28

## 2022-09-28 DIAGNOSIS — Z80.3 FAMILY HISTORY OF BREAST CANCER IN FEMALE: ICD-10-CM

## 2022-09-28 DIAGNOSIS — C50.812 MALIGNANT NEOPLASM OF OVERLAPPING SITES OF LEFT FEMALE BREAST, UNSPECIFIED ESTROGEN RECEPTOR STATUS (HCC): Primary | ICD-10-CM

## 2022-09-30 ENCOUNTER — TELEPHONE (OUTPATIENT)
Dept: SURGERY | Age: 67
End: 2022-09-30

## 2022-09-30 NOTE — TELEPHONE ENCOUNTER
Patient called she was a little confused on why she got a call today about US being on the 12th of October she said she already has one for the 10th.

## 2022-10-06 ENCOUNTER — HOSPITAL ENCOUNTER (OUTPATIENT)
Dept: MRI IMAGING | Age: 67
Discharge: HOME | End: 2022-10-06
Payer: MEDICARE

## 2022-10-06 DIAGNOSIS — C50.812 MALIGNANT NEOPLASM OF OVERLAPPING SITES OF LEFT FEMALE BREAST, UNSPECIFIED ESTROGEN RECEPTOR STATUS (HCC): ICD-10-CM

## 2022-10-06 DIAGNOSIS — C50.812 MALIGNANT NEOPLASM OF OVERLAPPING SITES OF LEFT BREAST (HCC): ICD-10-CM

## 2022-10-06 DIAGNOSIS — Z80.3 FAMILY HISTORY OF BREAST CANCER IN FEMALE: ICD-10-CM

## 2022-10-06 LAB
GFR AFRICAN AMERICAN: >60
GFR NON-AFRICAN AMERICAN: >60
PERFORMED ON: NORMAL
POC CREATININE: 0.9 MG/DL (ref 0.3–1.3)
POC SAMPLE TYPE: NORMAL

## 2022-10-06 PROCEDURE — C8908 MRI W/O FOL W/CONT, BREAST,: HCPCS

## 2022-10-06 PROCEDURE — 6360000004 HC RX CONTRAST MEDICATION: Performed by: PHYSICIAN ASSISTANT

## 2022-10-06 PROCEDURE — A9577 INJ MULTIHANCE: HCPCS | Performed by: PHYSICIAN ASSISTANT

## 2022-10-06 PROCEDURE — 82565 ASSAY OF CREATININE: CPT

## 2022-10-06 RX ADMIN — GADOBENATE DIMEGLUMINE 11 ML: 529 INJECTION, SOLUTION INTRAVENOUS at 15:42

## 2022-10-07 NOTE — PROGRESS NOTES
HISTORY OF PRESENT ILLNESS:    Ms. Gentry Ellison  is recently status post ultrasound guided breast biopsy  on the left which revealed a 1.3 cm low grade invasive ductal carcinoma. ER positive at 96%. SC positive at 67%. Her2 is Equivocal. Ki67 is 6%. Mammaprint is low risk luminal type A. MRI-10/6/2022     EXAM REASON: Patient is a 52-year-old female with biopsy-proven breast   cancer involving the left breast.   COMPARISON:    Outside imaging dated July 6, 2022, August 9, 2022, and internal   breast biopsy and post invasive mammogram September 26, 2022. TECHNICAL:    Report: Multiplanar MR imaging of the breasts was performed using a   dedicated breast coil in a Maura 1.0 [place] magnet, with and without   contrast. The examination is review on the [employer] workstation in   entirety. FINDINGS:   Some suboptimal imaging of the exam is noted including poor fat   saturation. Background parenchymal enhancement:   Minimal   Fibroglandular tissue:   Scattered   Right breast:   No suspicious MRI finding. Left breast:   In the left breast at 9:00 mid depth is demonstration of a irregular   noncircumscribed heterogeneously enhancing mass measuring 12 x 16 x 18   mm in length by with fat height. The signal void most consistent with   a biopsy clip is noted to be at the very superior extent of the mass. There is no additional suspicious finding. Lymph nodes:   No abnormal axillary or internal mammary chain lymph nodes are seen. No worrisome extramammary findings seen. Impression   1. Left breast, BI-RADS 6, biopsy-proven malignancy involving an 18 mm   mass at 9:00, with biopsy clip at the far superior extent of the mass. 2.  No suspicious findings seen in the right breast.   Overall assessment, BI-RADS 6, biopsy-proven malignancy. DISCUSSION:  I had a lengthy discussion with Ms. Gentry Ellison  and her family about the ramifications of the diagnosis of breast cancer.   We discussed the pathophysiology of cancer in general and also the ways in which surgery, radiation therapy, and chemotherapy are utilized in the treatment of different types of cancers. We also explained how these modalities related to her situation in particular. We discussed the pathophysiology of breast cancer and some length, including what is known about the causes of breast cancer, its relationship to fibrocystic disease, its relationship to hormone replacement therapy, and some of the genetic aspects involved in familial breast cancers. We discussed the BrCa genetic analysis and why it is appropriate for her. We discussed breast MRI and how it assists in evaluation of breast cancers and the results of her MRI if done. We discussed the surgical options including simple mastectomy and lumpectomy with  sentinel lymph node biopsy as well as the possibility of axillary lymph node dissection. We explained in depth why breast conservation therapy requires radiation treatments for the majority of women. These treatments may be external beam for 6 weeks, partial breast for 5 days,  or intraoperative. I explained that most women treated for invasive malignancy do receive systemic therapy, hormonal therapy or  chemotherapy postoperatively depending upon the final pathology, the lymph node status, and the hormone receptor status. I discussed Oncotype Dx, Mammoprint, and Adjuvant Online as tools which aid in the decision for chemotherapy or hormonal therapy. We also discussed the possibility of breast reconstruction if a mastectomy was required. .  I explained to her the different techniques including placement of a subpectoral implant with a Alloderm sling  versus TRAM flap reconstruction as welll as other methods of reconstruction. She does not wish to pursue reconstruction at this time.         After a prolonged discussion lasting 105 minutes  we felt it was most appropriate that she undergo left lumpectomy, and sentinel node biopsy, with preop ultrasound, and and intraop ultrasound guided needle localization and IORT    We started tamoxifen and Singulair today      We discussed the risks and benefits of the surgery including but not limited to bleeding, infection, pain, lymphedema, numbness, and also the risks of general anesthesia including pneumonia, blood clots, heart attack, stroke, and death. She expresses good understanding and is agreeable to proceed with surgery.       We will schedule this to be done when convenient  at [facility].

## 2022-10-10 ENCOUNTER — INITIAL CONSULT (OUTPATIENT)
Dept: SURGERY | Age: 67
End: 2022-10-10
Payer: MEDICARE

## 2022-10-10 DIAGNOSIS — Z17.0 MALIGNANT NEOPLASM OF OVERLAPPING SITES OF LEFT BREAST IN FEMALE, ESTROGEN RECEPTOR POSITIVE (HCC): ICD-10-CM

## 2022-10-10 DIAGNOSIS — C50.812 MALIGNANT NEOPLASM OF OVERLAPPING SITES OF LEFT BREAST IN FEMALE, ESTROGEN RECEPTOR POSITIVE (HCC): ICD-10-CM

## 2022-10-10 PROCEDURE — G8420 CALC BMI NORM PARAMETERS: HCPCS | Performed by: SURGERY

## 2022-10-10 PROCEDURE — 3017F COLORECTAL CA SCREEN DOC REV: CPT | Performed by: SURGERY

## 2022-10-10 PROCEDURE — 4004F PT TOBACCO SCREEN RCVD TLK: CPT | Performed by: SURGERY

## 2022-10-10 PROCEDURE — 99215 OFFICE O/P EST HI 40 MIN: CPT | Performed by: SURGERY

## 2022-10-10 PROCEDURE — G8484 FLU IMMUNIZE NO ADMIN: HCPCS | Performed by: SURGERY

## 2022-10-10 PROCEDURE — G2212 PROLONG OUTPT/OFFICE VIS: HCPCS | Performed by: SURGERY

## 2022-10-10 PROCEDURE — 1123F ACP DISCUSS/DSCN MKR DOCD: CPT | Performed by: SURGERY

## 2022-10-10 PROCEDURE — G8400 PT W/DXA NO RESULTS DOC: HCPCS | Performed by: SURGERY

## 2022-10-10 PROCEDURE — G8428 CUR MEDS NOT DOCUMENT: HCPCS | Performed by: SURGERY

## 2022-10-10 PROCEDURE — 1090F PRES/ABSN URINE INCON ASSESS: CPT | Performed by: SURGERY

## 2022-10-10 NOTE — Clinical Note
Left lumpectomy sentinel node and IORT in November Preop ultrasound and intraoperative ultrasound-guided needle localization external  Pec block, BioSorb, flaps, margin probe Day or so before to office for marking, PT, etc.

## 2022-10-10 NOTE — Clinical Note
Left lumpectomy with sentinel node and IORT-November Preop ultrasound intraoperative ultrasound-guided needle localization Pec block, BioSorb, flaps, margin probe Day or so before to office for marking, PT, etc. All the usual

## 2022-10-11 ENCOUNTER — TELEPHONE (OUTPATIENT)
Dept: OTHER | Age: 67
End: 2022-10-11

## 2022-10-11 NOTE — TELEPHONE ENCOUNTER
My chart message sent.
jia sent from [facility] assisted living for coffee ground vomiting x 3 days no abd pain

## 2022-10-18 DIAGNOSIS — C50.812 MALIGNANT NEOPLASM OF OVERLAPPING SITES OF LEFT BREAST IN FEMALE, ESTROGEN RECEPTOR POSITIVE (HCC): Primary | ICD-10-CM

## 2022-10-18 DIAGNOSIS — Z17.0 MALIGNANT NEOPLASM OF OVERLAPPING SITES OF LEFT BREAST IN FEMALE, ESTROGEN RECEPTOR POSITIVE (HCC): Primary | ICD-10-CM

## 2022-10-20 ENCOUNTER — TELEPHONE (OUTPATIENT)
Dept: CARDIOLOGY CLINIC | Age: 67
End: 2022-10-20

## 2022-10-20 LAB
Lab: NEGATIVE
Lab: NORMAL

## 2022-10-21 ENCOUNTER — TELEPHONE (OUTPATIENT)
Dept: SURGERY | Age: 67
End: 2022-10-21

## 2022-10-21 NOTE — TELEPHONE ENCOUNTER
10/21/2022 America Correia with [facility] called regarding MammaPrint request.  Stated he was needing results from biopsy and or surgery if complete. They are needing the staging of cancer to prevent denial.  Please call with any questions.     Fax [phone]  Contact [phone]  amie

## 2022-10-24 ENCOUNTER — TELEPHONE (OUTPATIENT)
Dept: CARDIOLOGY CLINIC | Age: 67
End: 2022-10-24

## 2022-11-03 ENCOUNTER — HOSPITAL ENCOUNTER (OUTPATIENT)
Dept: INFUSION THERAPY | Age: 67
Discharge: HOME | End: 2022-11-03

## 2022-11-03 NOTE — CONSULTS
Completed a post-test results disclosure discussion with patient. The patient's testing was Negative for a clinically actionable gene mutation. If riskScore was performed for the patient, it was reviewed in detail. We also reviewed family members that may benefit from testing. I directed the patient to follow up with their healthcare provider, for next steps regarding their healthcare.   Results uploaded in [id number]

## 2022-11-07 ENCOUNTER — TELEPHONE (OUTPATIENT)
Dept: SURGERY | Age: 67
End: 2022-11-07

## 2022-11-07 NOTE — TELEPHONE ENCOUNTER
11/7/2022 Patient called regarding cream she is supposed to to be using. Please return call.     Callback # [phone]  amie

## 2022-11-09 ENCOUNTER — HOSPITAL ENCOUNTER (OUTPATIENT)
Dept: WOMENS IMAGING | Age: 67
Discharge: HOME | End: 2022-11-09
Payer: MEDICARE

## 2022-11-09 ENCOUNTER — HOSPITAL ENCOUNTER (OUTPATIENT)
Dept: PREADMISSION TESTING | Age: 67
Discharge: HOME | End: 2022-11-13
Payer: MEDICARE

## 2022-11-09 VITALS — BODY MASS INDEX: 22.71 KG/M2 | WEIGHT: 133 LBS | HEIGHT: 64 IN

## 2022-11-09 DIAGNOSIS — C50.812 MALIGNANT NEOPLASM OF OVERLAPPING SITES OF LEFT BREAST (HCC): ICD-10-CM

## 2022-11-09 LAB
ANION GAP SERPL CALCULATED.3IONS-SCNC: 13 MMOL/L (ref 7–19)
BASOPHILS ABSOLUTE: 0.1 K/UL (ref 0–0.2)
BASOPHILS RELATIVE PERCENT: 0.6 % (ref 0–1)
BUN BLDV-MCNC: 14 MG/DL (ref 8–23)
CALCIUM SERPL-MCNC: 9.1 MG/DL (ref 8.8–10.2)
CHLORIDE BLD-SCNC: 103 MMOL/L (ref 98–111)
CO2: 25 MMOL/L (ref 22–29)
CREAT SERPL-MCNC: 0.7 MG/DL (ref 0.5–0.9)
EOSINOPHILS ABSOLUTE: 0.2 K/UL (ref 0–0.6)
EOSINOPHILS RELATIVE PERCENT: 1.5 % (ref 0–5)
GFR SERPL CREATININE-BSD FRML MDRD: >60 ML/MIN/{1.73_M2}
GLUCOSE BLD-MCNC: 172 MG/DL (ref 74–109)
HCT VFR BLD CALC: 45.4 % (ref 37–47)
HEMOGLOBIN: 14.6 G/DL (ref 12–16)
IMMATURE GRANULOCYTES #: 0 K/UL
LYMPHOCYTES ABSOLUTE: 4 K/UL (ref 1.1–4.5)
LYMPHOCYTES RELATIVE PERCENT: 40.2 % (ref 20–40)
MCH RBC QN AUTO: 30.5 PG (ref 27–31)
MCHC RBC AUTO-ENTMCNC: 32.2 G/DL (ref 33–37)
MCV RBC AUTO: 94.8 FL (ref 81–99)
MONOCYTES ABSOLUTE: 0.5 K/UL (ref 0–0.9)
MONOCYTES RELATIVE PERCENT: 4.6 % (ref 0–10)
NEUTROPHILS ABSOLUTE: 5.2 K/UL (ref 1.5–7.5)
NEUTROPHILS RELATIVE PERCENT: 52.8 % (ref 50–65)
PDW BLD-RTO: 14.4 % (ref 11.5–14.5)
PLATELET # BLD: 253 K/UL (ref 130–400)
PMV BLD AUTO: 11.7 FL (ref 9.4–12.3)
POTASSIUM SERPL-SCNC: 4.3 MMOL/L (ref 3.5–5)
RBC # BLD: 4.79 M/UL (ref 4.2–5.4)
SODIUM BLD-SCNC: 141 MMOL/L (ref 136–145)
WBC # BLD: 9.9 K/UL (ref 4.8–10.8)

## 2022-11-09 PROCEDURE — 93005 ELECTROCARDIOGRAM TRACING: CPT | Performed by: SURGERY

## 2022-11-09 PROCEDURE — 76642 ULTRASOUND BREAST LIMITED: CPT

## 2022-11-09 PROCEDURE — 80048 BASIC METABOLIC PNL TOTAL CA: CPT

## 2022-11-09 PROCEDURE — 85025 COMPLETE CBC W/AUTO DIFF WBC: CPT

## 2022-11-09 RX ORDER — GABAPENTIN 300 MG/1
300 CAPSULE ORAL ONCE
Status: CANCELLED | OUTPATIENT
Start: 2022-11-11

## 2022-11-09 RX ORDER — ACETAMINOPHEN 325 MG/1
975 TABLET ORAL ONCE
Status: CANCELLED | OUTPATIENT
Start: 2022-11-11

## 2022-11-09 RX ORDER — ASPIRIN 81 MG/1
81 TABLET ORAL DAILY
COMMUNITY

## 2022-11-09 NOTE — DISCHARGE INSTRUCTIONS
PREOPERATIVE GUIDELINES WHEN RECEIVING ANESTHESIA    Do not eat or drink anything after midnight, the night before your surgery. This is extremely important for your safety. Take a bath (or shower) the night before your surgery and you may brush your teeth the morning of your surgery. You will be scheduled to arrive at the hospital 2 hours before your surgery, or follow your surgeon's instructions. Dress comfortably. Wear loose clothing that will be easy to remove and comfortable for your trip home. You may wear eyeglasses or contacts but bring your cases with you as they must be remove before your surgery. Hearing aids and dentures will need to be removed before your surgery. Do not wear any jewelry, including body jewelry. All jewelry will need to be removed prior to your surgery. Do not wear fingernail polish or make-up. It is best not to bring any valuables with you. If you are to stay in the hospital overnight, bring your robe, slippers and personal toiletries that you may need. POSTOPERATIVE GUIDELINES AFTER RECEIVING ANESTHESIA    If you are to go home after your surgery, you will need a responsible adult to drive you home. You will not be able to take public transportation after your discharge from the Operative Care Unit unless you are accompanied by a        responsible adult. On returning home, be sure to follow your physician's orders regarding diet, activity and medications. Remember, surgery with general anesthesia or sedation may leave you sleepy, very tired and with a decreased appetite for 12 to 24 hours. If you develop any post-surgical complications or problems, call your surgeon or [facility] ([phone]). The day before surgery you will receive a phone call from the surgery nurse to let you know what time to arrive on the day of surgery.  This call will usually be between 2-4 PM. If you do not receive a phone call by 4 PM the day before your surgery please call [phone] and let them know you have not received an arrival time. If your surgery is on Monday, your call will be on the Friday before your Monday surgery. MEDICATION INSTRUCTIONS PRIOR TO YOUR SURGERY    Night before surgery:      ________Do not take Metformin (you will not be eating or drinking after midnight)        The morning of surgery: You can take all your usual prescribed medications with a small sip of water. DO NOT TAKE ANY DIABETIC MEDICATIONS the morning of your surgery. DO NOT TAKE ANY SUPPLEMENTS or over the counter medications the morning of  surgery. Teresita Avila for the [facility]    A script for Bactroban ointment has been call to your pharmacy or was given to you in written form by your surgeon. The guidelines for the ointment use are as follows:    1)  Start using the ointment 7 days before your surgery date    2)  Use the ointment two times a day - morning and night    3)  Place the ointment on a Q-tip and swirl up in your nose making sure you cover completely       the skin just inside of each nostril. Use one end of the Q-tip for each nostril.     hibiclens    Patient should shower with this soap a minimum of 2 consecutive showers (the night before surgery and the morning of surgery) washing from the neck down (avoiding contact with genitalia). DO NOT [address] YOUR HAIR OR FACE WITH THIS SOAP. When washing with this soap, apply enough to suds up the body thoroughly, turn the water away from your body and allow the soap suds to remain on the body for 2 full minutes, then rinse body completely. After using this soap on the body, please do not apply powders or lotions to your body. After the shower the night before surgery, please dry off with a clean towel, sleep in freshly laundered pj's, and change your bed linen before going to sleep.          [facility]-Revised 6-    Visitors for surgery patients are essential for the patient's emotional well-being and care       post operatively. 2.   Visitor Expectations and Limitations          3. One visitor allowed with patients in the preop/postop rooms. 4.  A second visitor may sit in the waiting area. 5.  No children under 13 allowed in the pre-post op areas unless they are the patient. 6.  Two people may be with an underage surgical/procedural patient in preop/postop        room. 7.  If you are admitted to the hospital post operatively, there are NO RESTRICTIONS on       the floor at this time. 8.  If you are admitted to ICU postoperatively, you may have one visitor in the room from        7A-7P. A second visitor may sit in the ICU waiting room.   There can be no overnight

## 2022-11-11 ENCOUNTER — APPOINTMENT (OUTPATIENT)
Dept: NUCLEAR MEDICINE | Age: 67
End: 2022-11-11
Payer: MEDICARE

## 2022-11-11 ENCOUNTER — HOSPITAL ENCOUNTER (OUTPATIENT)
Dept: WOMENS IMAGING | Age: 67
Discharge: HOME | End: 2022-11-11
Payer: MEDICARE

## 2022-11-11 ENCOUNTER — HOSPITAL ENCOUNTER (OUTPATIENT)
Age: 67
Setting detail: OUTPATIENT SURGERY
Discharge: HOME | End: 2022-11-11
Attending: SURGERY | Admitting: SURGERY
Payer: MEDICARE

## 2022-11-11 ENCOUNTER — ANESTHESIA EVENT (OUTPATIENT)
Dept: OPERATING ROOM | Age: 67
End: 2022-11-11
Payer: MEDICARE

## 2022-11-11 ENCOUNTER — ANESTHESIA (OUTPATIENT)
Dept: OPERATING ROOM | Age: 67
End: 2022-11-11
Payer: MEDICARE

## 2022-11-11 VITALS
DIASTOLIC BLOOD PRESSURE: 88 MMHG | HEIGHT: 64 IN | OXYGEN SATURATION: 99 % | BODY MASS INDEX: 22.71 KG/M2 | RESPIRATION RATE: 16 BRPM | SYSTOLIC BLOOD PRESSURE: 176 MMHG | WEIGHT: 133 LBS | HEART RATE: 98 BPM | TEMPERATURE: 97.8 F

## 2022-11-11 DIAGNOSIS — Z17.0 MALIGNANT NEOPLASM OF OVERLAPPING SITES OF LEFT BREAST IN FEMALE, ESTROGEN RECEPTOR POSITIVE (HCC): ICD-10-CM

## 2022-11-11 DIAGNOSIS — C50.812 MALIGNANT NEOPLASM OF OVERLAPPING SITES OF LEFT BREAST IN FEMALE, ESTROGEN RECEPTOR POSITIVE (HCC): ICD-10-CM

## 2022-11-11 LAB
GLUCOSE BLD-MCNC: 140 MG/DL (ref 70–99)
PERFORMED ON: ABNORMAL

## 2022-11-11 PROCEDURE — 88307 TISSUE EXAM BY PATHOLOGIST: CPT

## 2022-11-11 PROCEDURE — 2500000003 HC RX 250 WO HCPCS: Performed by: SURGERY

## 2022-11-11 PROCEDURE — 3600000014 HC SURGERY LEVEL 4 ADDTL 15MIN: Performed by: SURGERY

## 2022-11-11 PROCEDURE — 3600000004 HC SURGERY LEVEL 4 BASE: Performed by: SURGERY

## 2022-11-11 PROCEDURE — 6360000002 HC RX W HCPCS: Performed by: NURSE ANESTHETIST, CERTIFIED REGISTERED

## 2022-11-11 PROCEDURE — 2709999900 HC NON-CHARGEABLE SUPPLY: Performed by: SURGERY

## 2022-11-11 PROCEDURE — 3700000000 HC ANESTHESIA ATTENDED CARE: Performed by: SURGERY

## 2022-11-11 PROCEDURE — 6360000002 HC RX W HCPCS: Performed by: SURGERY

## 2022-11-11 PROCEDURE — A4217 STERILE WATER/SALINE, 500 ML: HCPCS | Performed by: SURGERY

## 2022-11-11 PROCEDURE — C1819 TISSUE LOCALIZATION-EXCISION: HCPCS

## 2022-11-11 PROCEDURE — 3430000000 HC RX DIAGNOSTIC RADIOPHARMACEUTICAL: Performed by: SURGERY

## 2022-11-11 PROCEDURE — 7100000010 HC PHASE II RECOVERY - FIRST 15 MIN: Performed by: SURGERY

## 2022-11-11 PROCEDURE — 6370000000 HC RX 637 (ALT 250 FOR IP): Performed by: SURGERY

## 2022-11-11 PROCEDURE — 88329 PATH CONSLTJ DRG SURG: CPT

## 2022-11-11 PROCEDURE — 19340 INSJ BREAST IMPLT SM D MAST: CPT | Performed by: PHYSICIAN ASSISTANT

## 2022-11-11 PROCEDURE — 19297 PLACE BREAST CATH FOR RAD: CPT | Performed by: SURGERY

## 2022-11-11 PROCEDURE — A9520 TC99 TILMANOCEPT DIAG 0.5MCI: HCPCS | Performed by: SURGERY

## 2022-11-11 PROCEDURE — C1728 CATH, BRACHYTX SEED ADM: HCPCS | Performed by: SURGERY

## 2022-11-11 PROCEDURE — 0546T RF SPECTRSC NTRAOP MRGN ASMT: CPT | Performed by: SURGERY

## 2022-11-11 PROCEDURE — 7100000000 HC PACU RECOVERY - FIRST 15 MIN: Performed by: SURGERY

## 2022-11-11 PROCEDURE — 82947 ASSAY GLUCOSE BLOOD QUANT: CPT

## 2022-11-11 PROCEDURE — C1713 ANCHOR/SCREW BN/BN,TIS/BN: HCPCS | Performed by: SURGERY

## 2022-11-11 PROCEDURE — 38525 BIOPSY/REMOVAL LYMPH NODES: CPT | Performed by: SURGERY

## 2022-11-11 PROCEDURE — 2580000003 HC RX 258: Performed by: ANESTHESIOLOGY

## 2022-11-11 PROCEDURE — 3700000001 HC ADD 15 MINUTES (ANESTHESIA): Performed by: SURGERY

## 2022-11-11 PROCEDURE — 38900 IO MAP OF SENT LYMPH NODE: CPT | Performed by: PHYSICIAN ASSISTANT

## 2022-11-11 PROCEDURE — 38792 RA TRACER ID OF SENTINL NODE: CPT

## 2022-11-11 PROCEDURE — 38900 IO MAP OF SENT LYMPH NODE: CPT | Performed by: SURGERY

## 2022-11-11 PROCEDURE — 19294 PREPJ TUM CAV IORT PRTL MAST: CPT | Performed by: SURGERY

## 2022-11-11 PROCEDURE — 2580000003 HC RX 258: Performed by: SURGERY

## 2022-11-11 PROCEDURE — A4648 IMPLANTABLE TISSUE MARKER: HCPCS | Performed by: SURGERY

## 2022-11-11 PROCEDURE — 7100000001 HC PACU RECOVERY - ADDTL 15 MIN: Performed by: SURGERY

## 2022-11-11 PROCEDURE — 77424 IO RAD TX DELIVERY BY X-RAY: CPT | Performed by: SURGERY

## 2022-11-11 PROCEDURE — 19301 PARTIAL MASTECTOMY: CPT | Performed by: SURGERY

## 2022-11-11 PROCEDURE — 2500000003 HC RX 250 WO HCPCS: Performed by: NURSE ANESTHETIST, CERTIFIED REGISTERED

## 2022-11-11 PROCEDURE — 19340 INSJ BREAST IMPLT SM D MAST: CPT | Performed by: SURGERY

## 2022-11-11 PROCEDURE — 2580000003 HC RX 258: Performed by: NURSE ANESTHETIST, CERTIFIED REGISTERED

## 2022-11-11 PROCEDURE — 7100000011 HC PHASE II RECOVERY - ADDTL 15 MIN: Performed by: SURGERY

## 2022-11-11 PROCEDURE — 38525 BIOPSY/REMOVAL LYMPH NODES: CPT | Performed by: PHYSICIAN ASSISTANT

## 2022-11-11 RX ORDER — SODIUM CHLORIDE 9 MG/ML
INJECTION, SOLUTION INTRAVENOUS PRN
Status: DISCONTINUED | OUTPATIENT
Start: 2022-11-11 | End: 2022-11-11 | Stop reason: HOSPADM

## 2022-11-11 RX ORDER — SODIUM CHLORIDE 0.9 % (FLUSH) 0.9 %
5-40 SYRINGE (ML) INJECTION EVERY 12 HOURS SCHEDULED
Status: DISCONTINUED | OUTPATIENT
Start: 2022-11-11 | End: 2022-11-11 | Stop reason: HOSPADM

## 2022-11-11 RX ORDER — SODIUM CHLORIDE, SODIUM LACTATE, POTASSIUM CHLORIDE, CALCIUM CHLORIDE 600; 310; 30; 20 MG/100ML; MG/100ML; MG/100ML; MG/100ML
INJECTION, SOLUTION INTRAVENOUS CONTINUOUS
Status: DISCONTINUED | OUTPATIENT
Start: 2022-11-11 | End: 2022-11-11 | Stop reason: HOSPADM

## 2022-11-11 RX ORDER — HYDROMORPHONE HYDROCHLORIDE 1 MG/ML
0.5 INJECTION, SOLUTION INTRAMUSCULAR; INTRAVENOUS; SUBCUTANEOUS EVERY 5 MIN PRN
Status: DISCONTINUED | OUTPATIENT
Start: 2022-11-11 | End: 2022-11-11 | Stop reason: HOSPADM

## 2022-11-11 RX ORDER — OXYCODONE AND ACETAMINOPHEN 10; 325 MG/1; MG/1
1 TABLET ORAL EVERY 6 HOURS PRN
Qty: 10 TABLET | Refills: 0 | Status: SHIPPED | OUTPATIENT
Start: 2022-11-11 | End: 2023-11-11

## 2022-11-11 RX ORDER — DIPHENHYDRAMINE HYDROCHLORIDE 50 MG/ML
12.5 INJECTION INTRAMUSCULAR; INTRAVENOUS
Status: DISCONTINUED | OUTPATIENT
Start: 2022-11-11 | End: 2022-11-11 | Stop reason: HOSPADM

## 2022-11-11 RX ORDER — ACETAMINOPHEN 325 MG/1
975 TABLET ORAL ONCE
Status: COMPLETED | OUTPATIENT
Start: 2022-11-11 | End: 2022-11-11

## 2022-11-11 RX ORDER — LIDOCAINE HYDROCHLORIDE 10 MG/ML
INJECTION, SOLUTION INFILTRATION; PERINEURAL PRN
Status: DISCONTINUED | OUTPATIENT
Start: 2022-11-11 | End: 2022-11-11 | Stop reason: SDUPTHER

## 2022-11-11 RX ORDER — MEPERIDINE HYDROCHLORIDE 25 MG/ML
12.5 INJECTION INTRAMUSCULAR; INTRAVENOUS; SUBCUTANEOUS EVERY 5 MIN PRN
Status: DISCONTINUED | OUTPATIENT
Start: 2022-11-11 | End: 2022-11-11 | Stop reason: HOSPADM

## 2022-11-11 RX ORDER — METOPROLOL TARTRATE 5 MG/5ML
INJECTION INTRAVENOUS PRN
Status: DISCONTINUED | OUTPATIENT
Start: 2022-11-11 | End: 2022-11-11 | Stop reason: SDUPTHER

## 2022-11-11 RX ORDER — LABETALOL 20 MG/4 ML (5 MG/ML) INTRAVENOUS SYRINGE
PRN
Status: DISCONTINUED | OUTPATIENT
Start: 2022-11-11 | End: 2022-11-11 | Stop reason: SDUPTHER

## 2022-11-11 RX ORDER — METOCLOPRAMIDE HYDROCHLORIDE 5 MG/ML
10 INJECTION INTRAMUSCULAR; INTRAVENOUS
Status: DISCONTINUED | OUTPATIENT
Start: 2022-11-11 | End: 2022-11-11 | Stop reason: HOSPADM

## 2022-11-11 RX ORDER — FAMOTIDINE 10 MG/ML
INJECTION, SOLUTION INTRAVENOUS PRN
Status: DISCONTINUED | OUTPATIENT
Start: 2022-11-11 | End: 2022-11-11 | Stop reason: SDUPTHER

## 2022-11-11 RX ORDER — DIPHENHYDRAMINE HYDROCHLORIDE 50 MG/ML
INJECTION INTRAMUSCULAR; INTRAVENOUS PRN
Status: DISCONTINUED | OUTPATIENT
Start: 2022-11-11 | End: 2022-11-11 | Stop reason: SDUPTHER

## 2022-11-11 RX ORDER — GABAPENTIN 300 MG/1
300 CAPSULE ORAL ONCE
Status: COMPLETED | OUTPATIENT
Start: 2022-11-11 | End: 2022-11-11

## 2022-11-11 RX ORDER — FENTANYL CITRATE 50 UG/ML
INJECTION, SOLUTION INTRAMUSCULAR; INTRAVENOUS PRN
Status: DISCONTINUED | OUTPATIENT
Start: 2022-11-11 | End: 2022-11-11 | Stop reason: SDUPTHER

## 2022-11-11 RX ORDER — ONDANSETRON 2 MG/ML
INJECTION INTRAMUSCULAR; INTRAVENOUS PRN
Status: DISCONTINUED | OUTPATIENT
Start: 2022-11-11 | End: 2022-11-11 | Stop reason: SDUPTHER

## 2022-11-11 RX ORDER — SODIUM CHLORIDE 0.9 % (FLUSH) 0.9 %
5-40 SYRINGE (ML) INJECTION PRN
Status: DISCONTINUED | OUTPATIENT
Start: 2022-11-11 | End: 2022-11-11 | Stop reason: HOSPADM

## 2022-11-11 RX ORDER — HYDROMORPHONE HYDROCHLORIDE 1 MG/ML
0.25 INJECTION, SOLUTION INTRAMUSCULAR; INTRAVENOUS; SUBCUTANEOUS EVERY 5 MIN PRN
Status: DISCONTINUED | OUTPATIENT
Start: 2022-11-11 | End: 2022-11-11 | Stop reason: HOSPADM

## 2022-11-11 RX ORDER — LEVOFLOXACIN 500 MG/1
500 TABLET, FILM COATED ORAL DAILY
Qty: 7 TABLET | Refills: 0 | Status: SHIPPED | OUTPATIENT
Start: 2022-11-11 | End: 2022-11-18

## 2022-11-11 RX ORDER — PROPOFOL 10 MG/ML
INJECTION, EMULSION INTRAVENOUS PRN
Status: DISCONTINUED | OUTPATIENT
Start: 2022-11-11 | End: 2022-11-11 | Stop reason: SDUPTHER

## 2022-11-11 RX ORDER — SODIUM CHLORIDE, SODIUM LACTATE, POTASSIUM CHLORIDE, CALCIUM CHLORIDE 600; 310; 30; 20 MG/100ML; MG/100ML; MG/100ML; MG/100ML
INJECTION, SOLUTION INTRAVENOUS CONTINUOUS PRN
Status: DISCONTINUED | OUTPATIENT
Start: 2022-11-11 | End: 2022-11-11 | Stop reason: SDUPTHER

## 2022-11-11 RX ADMIN — ACETAMINOPHEN 975 MG: 325 TABLET ORAL at 10:13

## 2022-11-11 RX ADMIN — PROPOFOL 20 MG: 10 INJECTION, EMULSION INTRAVENOUS at 10:58

## 2022-11-11 RX ADMIN — TILMANOCEPT 0.5 MILLICURIE: KIT at 11:29

## 2022-11-11 RX ADMIN — PROPOFOL 160 MCG/KG/MIN: 10 INJECTION, EMULSION INTRAVENOUS at 10:55

## 2022-11-11 RX ADMIN — ONDANSETRON 4 MG: 2 INJECTION INTRAMUSCULAR; INTRAVENOUS at 12:26

## 2022-11-11 RX ADMIN — GABAPENTIN 300 MG: 300 CAPSULE ORAL at 10:13

## 2022-11-11 RX ADMIN — LABETALOL 20 MG/4 ML (5 MG/ML) INTRAVENOUS SYRINGE 5 MG: at 12:44

## 2022-11-11 RX ADMIN — FENTANYL CITRATE 25 MCG: 50 INJECTION, SOLUTION INTRAMUSCULAR; INTRAVENOUS at 11:59

## 2022-11-11 RX ADMIN — CEFAZOLIN 2000 MG: 2 INJECTION, POWDER, FOR SOLUTION INTRAMUSCULAR; INTRAVENOUS at 11:03

## 2022-11-11 RX ADMIN — PROPOFOL 30 MG: 10 INJECTION, EMULSION INTRAVENOUS at 12:55

## 2022-11-11 RX ADMIN — FAMOTIDINE 20 MG: 10 INJECTION, SOLUTION INTRAVENOUS at 10:54

## 2022-11-11 RX ADMIN — FENTANYL CITRATE 25 MCG: 50 INJECTION, SOLUTION INTRAMUSCULAR; INTRAVENOUS at 11:25

## 2022-11-11 RX ADMIN — SODIUM CHLORIDE, POTASSIUM CHLORIDE, SODIUM LACTATE AND CALCIUM CHLORIDE: 600; 310; 30; 20 INJECTION, SOLUTION INTRAVENOUS at 10:14

## 2022-11-11 RX ADMIN — PROPOFOL 20 MG: 10 INJECTION, EMULSION INTRAVENOUS at 12:57

## 2022-11-11 RX ADMIN — FENTANYL CITRATE 25 MCG: 50 INJECTION, SOLUTION INTRAMUSCULAR; INTRAVENOUS at 11:42

## 2022-11-11 RX ADMIN — SODIUM CHLORIDE, SODIUM LACTATE, POTASSIUM CHLORIDE, AND CALCIUM CHLORIDE: 600; 310; 30; 20 INJECTION, SOLUTION INTRAVENOUS at 12:38

## 2022-11-11 RX ADMIN — LIDOCAINE HYDROCHLORIDE 50 MG: 10 INJECTION, SOLUTION INFILTRATION; PERINEURAL at 10:53

## 2022-11-11 RX ADMIN — FENTANYL CITRATE 25 MCG: 50 INJECTION, SOLUTION INTRAMUSCULAR; INTRAVENOUS at 12:23

## 2022-11-11 RX ADMIN — LABETALOL 20 MG/4 ML (5 MG/ML) INTRAVENOUS SYRINGE 5 MG: at 12:55

## 2022-11-11 RX ADMIN — LABETALOL 20 MG/4 ML (5 MG/ML) INTRAVENOUS SYRINGE 5 MG: at 12:39

## 2022-11-11 RX ADMIN — DIPHENHYDRAMINE HYDROCHLORIDE 25 MG: 50 INJECTION, SOLUTION INTRAMUSCULAR; INTRAVENOUS at 10:54

## 2022-11-11 RX ADMIN — SODIUM CHLORIDE, SODIUM LACTATE, POTASSIUM CHLORIDE, AND CALCIUM CHLORIDE: 600; 310; 30; 20 INJECTION, SOLUTION INTRAVENOUS at 10:48

## 2022-11-11 RX ADMIN — LABETALOL 20 MG/4 ML (5 MG/ML) INTRAVENOUS SYRINGE 5 MG: at 12:50

## 2022-11-11 RX ADMIN — SODIUM CHLORIDE, SODIUM LACTATE, POTASSIUM CHLORIDE, AND CALCIUM CHLORIDE: 600; 310; 30; 20 INJECTION, SOLUTION INTRAVENOUS at 11:26

## 2022-11-11 RX ADMIN — METOPROLOL TARTRATE 2.5 MG: 1 INJECTION, SOLUTION INTRAVENOUS at 11:48

## 2022-11-11 RX ADMIN — METOPROLOL TARTRATE 2.5 MG: 1 INJECTION, SOLUTION INTRAVENOUS at 12:04

## 2022-11-11 RX ADMIN — LABETALOL 20 MG/4 ML (5 MG/ML) INTRAVENOUS SYRINGE 5 MG: at 12:47

## 2022-11-11 RX ADMIN — FENTANYL CITRATE 25 MCG: 50 INJECTION, SOLUTION INTRAMUSCULAR; INTRAVENOUS at 11:39

## 2022-11-11 RX ADMIN — HYDROCORTISONE SODIUM SUCCINATE 100 MG: 100 INJECTION, POWDER, FOR SOLUTION INTRAMUSCULAR; INTRAVENOUS at 10:54

## 2022-11-11 RX ADMIN — PROPOFOL 20 MG: 10 INJECTION, EMULSION INTRAVENOUS at 11:04

## 2022-11-11 RX ADMIN — FENTANYL CITRATE 25 MCG: 50 INJECTION, SOLUTION INTRAMUSCULAR; INTRAVENOUS at 10:59

## 2022-11-11 RX ADMIN — FENTANYL CITRATE 25 MCG: 50 INJECTION, SOLUTION INTRAMUSCULAR; INTRAVENOUS at 10:57

## 2022-11-11 RX ADMIN — PROPOFOL 20 MG: 10 INJECTION, EMULSION INTRAVENOUS at 10:59

## 2022-11-11 RX ADMIN — LABETALOL 20 MG/4 ML (5 MG/ML) INTRAVENOUS SYRINGE 10 MG: at 13:01

## 2022-11-11 RX ADMIN — FENTANYL CITRATE 25 MCG: 50 INJECTION, SOLUTION INTRAMUSCULAR; INTRAVENOUS at 11:01

## 2022-11-11 RX ADMIN — PROPOFOL 30 MG: 10 INJECTION, EMULSION INTRAVENOUS at 11:38

## 2022-11-11 RX ADMIN — PROPOFOL 20 MG: 10 INJECTION, EMULSION INTRAVENOUS at 11:01

## 2022-11-11 RX ADMIN — PROPOFOL 50 MG: 10 INJECTION, EMULSION INTRAVENOUS at 10:54

## 2022-11-11 ASSESSMENT — PAIN DESCRIPTION - LOCATION
LOCATION: BREAST
LOCATION: BREAST

## 2022-11-11 ASSESSMENT — PAIN DESCRIPTION - ORIENTATION
ORIENTATION: LEFT
ORIENTATION: LEFT

## 2022-11-11 ASSESSMENT — PAIN SCALES - GENERAL
PAINLEVEL_OUTOF10: 0
PAINLEVEL_OUTOF10: 4

## 2022-11-11 ASSESSMENT — PAIN DESCRIPTION - DESCRIPTORS: DESCRIPTORS: ACHING

## 2022-11-11 ASSESSMENT — PAIN DESCRIPTION - PAIN TYPE
TYPE: SURGICAL PAIN
TYPE: SURGICAL PAIN

## 2022-11-11 ASSESSMENT — LIFESTYLE VARIABLES: SMOKING_STATUS: 1

## 2022-11-11 NOTE — H&P
HISTORY OF PRESENT ILLNESS:     Ms. Marlon Middleton  is recently status post ultrasound guided breast biopsy  on the left which revealed a 1.3 cm low grade invasive ductal carcinoma. ER positive at 96%. DC positive at 67%. Her2 is Equivocal. Ki67 is 6%. Mammaprint is low risk luminal type A. MRI-10/6/2022      EXAM REASON: Patient is a 80-year-old female with biopsy-proven breast   cancer involving the left breast.   COMPARISON:    Outside imaging dated July 6, 2022, August 9, 2022, and internal   breast biopsy and post invasive mammogram September 26, 2022. TECHNICAL:    Report: Multiplanar MR imaging of the breasts was performed using a   dedicated breast coil in a Maura 1.0 Gaviota magnet, with and without   contrast. The examination is review on the appiris workstation in   entirety. FINDINGS:   Some suboptimal imaging of the exam is noted including poor fat   saturation. Background parenchymal enhancement:   Minimal   Fibroglandular tissue:   Scattered   Right breast:   No suspicious MRI finding. Left breast:   In the left breast at 9:00 mid depth is demonstration of a irregular   noncircumscribed heterogeneously enhancing mass measuring 12 x 16 x 18   mm in length by with fat height. The signal void most consistent with   a biopsy clip is noted to be at the very superior extent of the mass. There is no additional suspicious finding. Lymph nodes:   No abnormal axillary or internal mammary chain lymph nodes are seen. No worrisome extramammary findings seen. Impression   1. Left breast, BI-RADS 6, biopsy-proven malignancy involving an 18 mm   mass at 9:00, with biopsy clip at the far superior extent of the mass. 2.  No suspicious findings seen in the right breast.   Overall assessment, BI-RADS 6, biopsy-proven malignancy.       Andres Elizondo is a 78 y.o. female with the following history as recorded in [facility]:  Patient Active Problem List    Diagnosis Date Noted    Malignant neoplasm of overlapping sites of left breast in female, estrogen receptor positive ([facility] 10/10/2022    Numbness     Angina pectoris, crescendo ([facility] 2018    Tobacco abuse     Hypotension 2015    CAD (coronary artery disease)     HTN (hypertension)     Hyperlipidemia     Diabetes mellitus ([facility]      Current Facility-Administered Medications   Medication Dose Route Frequency Provider Last Rate Last Admin    technetium Tc 99m tilmanocept (LYMPHOSEEK) injection 0.5 millicurie  253 micro curie IntraDERmal ONCE PRN Steven Rollins MD         Allergies: Hydrocodone and Sulfa antibiotics  Past Medical History:   Diagnosis Date    Breast lump     CAD (coronary artery disease)     hx of stent; sees [facility] cardiology    Diabetes mellitus ([facility]     GERD (gastroesophageal reflux disease)     History of blood transfusion     after c section    HTN (hypertension)     Hyperlipidemia     Malignant neoplasm of overlapping sites of left breast in female, estrogen receptor positive ([facility] 10/10/2022    Smoker     Thyroid disease     hx of surgery, no meds     Past Surgical History:   Procedure Laterality Date    CARDIAC CATHETERIZATION  12/3/14  MDL    stent to LAD. EF 60%     SECTION      COLONOSCOPY      HERNIA REPAIR      umbilical    THYROID SURGERY      partial    TONSILLECTOMY AND ADENOIDECTOMY      US BREAST NEEDLE BIOPSY LEFT Left 2022    US BREAST NEEDLE BIOPSY LEFT LPS GENERAL SURGERY     Family History   Problem Relation Age of Onset    Heart Attack Father     Breast Cancer Paternal Aunt 36    Cervical Cancer Paternal Aunt 34    Diabetes Maternal Grandmother     Diabetes Paternal Grandmother      Social History     Tobacco Use    Smoking status: Every Day     Packs/day: 1.00     Types: Cigarettes    Smokeless tobacco: Never   Substance Use Topics    Alcohol use: No       ROS:  14 point review of systems is negative except for the above.         PHYSICAL EXAM:    The patient is a 78 y.o. female  in no acute distress. She is alert oriented and cooperative. Mood and affect are appropriate. Skin is warm and dry without rashes. BP (!) 196/84   Pulse 76   Temp 97.3 °F (36.3 °C) (Temporal)   Resp 14   Ht 5' 4\" (1.626 m)   Wt 133 lb (60.3 kg)   SpO2 97%   BMI 22.83 kg/m²       HEENT: Normocephalic and atraumatic. EOMs intact. Pupils equal and round and reactive to light and accommodation. External ears and nose are normal.  Sclera nonicteric. Conjunctiva normal  Oropharynx without masses or lesions. Neck: Neck is supple without masses or thyromegaly    Chest: Lungs are clear to auscultation. Respiratory effort normal    Cardiac: Regular rate and rhythm without rubs, murmurs, or gallops    Breasts: The breasts are symmetrical. There are fibrocystic changes throughout both breasts. There are no dominant masses, no skin or nipple changes, and no axillary adenopathy. Abdomen: The abdomen is soft and nontender with no hepatosplenomegaly. There are no abdominal hernias noted. Extremities: The extremities are normal. There are no signs of clubbing, cyanosis, or edema. IMPRESSION: Left Breast cancer       DISCUSSION:  I had a lengthy discussion with Ms. Keena Santos  and her family about the ramifications of the diagnosis of breast cancer. We discussed the pathophysiology of cancer in general and also the ways in which surgery, radiation therapy, and chemotherapy are utilized in the treatment of different types of cancers. We also explained how these modalities related to her situation in particular. We discussed the pathophysiology of breast cancer and some length, including what is known about the causes of breast cancer, its relationship to fibrocystic disease, its relationship to hormone replacement therapy, and some of the genetic aspects involved in familial breast cancers. We discussed the BrCa genetic analysis and why it is appropriate for her.   We discussed breast MRI and how it assists in evaluation of breast cancers and the results of her MRI if done. We discussed the surgical options including simple mastectomy and lumpectomy with  sentinel lymph node biopsy as well as the possibility of axillary lymph node dissection. We explained in depth why breast conservation therapy requires radiation treatments for the majority of women. These treatments may be external beam for 6 weeks, partial breast for 5 days,  or intraoperative. I explained that most women treated for invasive malignancy do receive systemic therapy, hormonal therapy or  chemotherapy postoperatively depending upon the final pathology, the lymph node status, and the hormone receptor status. I discussed Oncotype Dx, Mammoprint, and Adjuvant Online as tools which aid in the decision for chemotherapy or hormonal therapy. We also discussed the possibility of breast reconstruction if a mastectomy was required. .  I explained to her the different techniques including placement of a subpectoral implant with a Alloderm sling  versus TRAM flap reconstruction as welll as other methods of reconstruction. She does not wish to pursue reconstruction at this time. After a prolonged discussion lasting 105 minutes  we felt it was most appropriate that she undergo left lumpectomy, and sentinel node biopsy, with preop ultrasound, and and intraop ultrasound guided needle localization and IORT     We started tamoxifen and Singulair today       We discussed the risks and benefits of the surgery including but not limited to bleeding, infection, pain, lymphedema, numbness, and also the risks of general anesthesia including pneumonia, blood clots, heart attack, stroke, and death. She expresses good understanding and is agreeable to proceed with surgery.        We will schedule this to be done when convenient  at [facility].

## 2022-11-11 NOTE — BRIEF OP NOTE
Brief Postoperative Note      DATE OF PROCEDURE: 11/11/2022     SURGEON: Mikayla Pearson MD    PREOPERATIVE DIAGNOSIS:  Malignant neoplasm of overlapping sites of left breast in female, estrogen receptor positive ([facility] [C50.812, Z17.0]    POSTOPERATIVE DIAGNOSIS: Same     OPERATION: Procedure(s):  LEFT LUMPECTOMY & SNB, PREOP & INTRAOP US GUIDED NEEDLE LOC, PEC BLOCK, BIOZORB, FLAPS, MARGINPROBE, & IORT    ANESTHESIA: Pec Block/MAC    ESTIMATED BLOOD LOSS: Minimal    COMPLICATIONS: None. SPECIMENS:   ID Type Source Tests Collected by Time Destination   A : left breast tissue Tissue Breast SURGICAL PATHOLOGY Mikayla Pearson MD 11/11/2022 1123    B : left breast tissue-additional cranial margins Tissue Breast SURGICAL PATHOLOGY Mikayla Pearson MD 11/11/2022 1138    C : left breast tissue-additional caudal margins Tissue Breast SURGICAL PATHOLOGY Mikayla Pearson MD 11/11/2022 1138    D : left breast tissue-additional lateral margins Tissue Breast SURGICAL PATHOLOGY Mikayla Pearson MD 11/11/2022 1138    E : left breast tissue-additional medial margins Tissue Breast SURGICAL PATHOLOGY Mikayla Pearson MD 11/11/2022 1138    F : left breast tissue-additional deep margins Tissue Breast SURGICAL PATHOLOGY Mikayla Pearson MD 11/11/2022 1138    G : left sentinel node Tissue Breast SURGICAL PATHOLOGY Mikayla Pearson MD 11/11/2022 1139        DRAINS: None    The patient tolerated the procedure well.     Electronically signed by Mikayla Pearson MD  on 11/11/2022 at 12:31 PM

## 2022-11-11 NOTE — ANESTHESIA PRE PROCEDURE
Department of Anesthesiology  Preprocedure Note       Name:  Yolanda Goldberg   Age:  78 y.o.  :  1955                                          MRN:  [id number]         Date:  2022      Surgeon: Dionicio Baird):  MD Juliet Valle MD    Procedure: Procedure(s):  LEFT LUMPECTOMY & SNB, PREOP & INTRAOP US GUIDED NEEDLE LOC, PEC BLOCK, BIOZORB, FLAPS, MARGINPROBE, & IORT    Medications prior to admission:   Prior to Admission medications    Medication Sig Start Date End Date Taking? Authorizing Provider   aspirin 81 MG EC tablet Take 81 mg by mouth daily    Historical Provider, MD   mupirocin (BACTROBAN) 2 % ointment Apply to each nostril BID x 5 days prior to surgery. 10/18/22   Aris Montero PA-C   TRULICITY 6.85 UF/7.9RB SOPN once a week 22   Historical Provider, MD   isosorbide mononitrate (IMDUR) 30 MG extended release tablet Take 1 tablet by mouth daily 20   GENO Khalil NP   furosemide (LASIX) 40 MG tablet Take 40 mg by mouth daily as needed    Historical Provider, MD   glipiZIDE (GLUCOTROL) 10 MG tablet Take 10 mg by mouth 2 times daily (before meals)  Patient not taking: No sig reported    Historical Provider, MD   nitroGLYCERIN (NITROSTAT) 0.4 MG SL tablet Place 1 tablet under the tongue every 5 minutes as needed for Chest pain 18   Minnie Maki MD   metFORMIN (GLUCOPHAGE) 1000 MG tablet Take 1 tablet by mouth 2 times daily (with meals) 18   Minnie Maki MD   amitriptyline (ELAVIL) 100 MG tablet Take 100 mg by mouth nightly    Historical Provider, MD   oxyCODONE-acetaminophen (PERCOCET)  MG per tablet Take 1 tablet by mouth 3 times daily 5/10/16   Historical Provider, MD   metoprolol (LOPRESSOR) 50 MG tablet Take 1 tablet by mouth 2 times daily 5/13/15   Minnie Maki MD   gabapentin (NEURONTIN) 600 MG tablet Take 1 tablet by mouth 4 times daily.  . 1/7/15   Historical Provider, MD       Current medications:    Current Facility-Administered Medications   Medication Dose Route Frequency Provider Last Rate Last Admin    lactated ringers infusion   IntraVENous Continuous Sarika Camarena  mL/hr at 22 1014 New Bag at 22 1014    technetium Tc 99m tilmanocept (LYMPHOSEEK) injection 0.5 millicurie  416 micro curie IntraDERmal ONCE PRN Ulisses Mabry MD        ceFAZolin (ANCEF) 2,000 mg in sterile water 20 mL IV syringe  2,000 mg IntraVENous On Call to [id number] [address] [place], [place]           Allergies: Allergies   Allergen Reactions    Hydrocodone Other (See Comments)     Severe headache, \"I couldn't move\"    Sulfa Antibiotics Itching       Problem List:    Patient Active Problem List   Diagnosis Code    CAD (coronary artery disease) I25.10    HTN (hypertension) I10    Hyperlipidemia E78.5    Diabetes mellitus ([address] E11.9    Hypotension I95.9    Tobacco abuse Z72.0    Angina pectoris, crescendo ([address] I20.0    Numbness R20.0    Malignant neoplasm of overlapping sites of left breast in female, estrogen receptor positive ([address] C50.812, Z17.0       Past Medical History:        Diagnosis Date    Breast lump     CAD (coronary artery disease)     hx of stent; sees [facility] cardiology    Diabetes mellitus ([address]     GERD (gastroesophageal reflux disease)     History of blood transfusion     after c section    HTN (hypertension)     Hyperlipidemia     Malignant neoplasm of overlapping sites of left breast in female, estrogen receptor positive ([address] 10/10/2022    Smoker     Thyroid disease     hx of surgery, no meds       Past Surgical History:        Procedure Laterality Date    CARDIAC CATHETERIZATION  12/3/14  MDL    stent to LAD.  EF 60%     SECTION      COLONOSCOPY      HERNIA REPAIR      umbilical    THYROID SURGERY      partial    TONSILLECTOMY AND ADENOIDECTOMY      US BREAST NEEDLE BIOPSY LEFT Left 2022    US BREAST NEEDLE BIOPSY LEFT LPS GENERAL SURGERY       Social History:    Social History     Tobacco Use    Smoking status: Every Day     Packs/day: 1.00     Types: Cigarettes    Smokeless tobacco: Never   Substance Use Topics    Alcohol use: No                                Ready to quit: Not Answered  Counseling given: Not Answered      Vital Signs (Current):   Vitals:    11/11/22 0947   BP: (!) 196/84   Pulse: 76   Resp: 14   Temp: 97.3 °F (36.3 °C)   TempSrc: Temporal   SpO2: 97%   Weight: 133 lb (60.3 kg)   Height: 5' 4\" (1.626 m)                                              BP Readings from Last 3 Encounters:   11/11/22 (!) 196/84   08/22/22 136/86   10/04/21 (!) 148/80       NPO Status: Time of last liquid consumption: 2100                        Time of last solid consumption: 2100                        Date of last liquid consumption: 11/10/22                        Date of last solid food consumption: 11/10/22    BMI:   Wt Readings from Last 3 Encounters:   11/11/22 133 lb (60.3 kg)   11/09/22 133 lb (60.3 kg)   08/22/22 128 lb (58.1 kg)     Body mass index is 22.83 kg/m².     CBC:   Lab Results   Component Value Date/Time    WBC 9.9 11/09/2022 02:55 PM    RBC 4.79 11/09/2022 02:55 PM    HGB 14.6 11/09/2022 02:55 PM    HCT 45.4 11/09/2022 02:55 PM    MCV 94.8 11/09/2022 02:55 PM    RDW 14.4 11/09/2022 02:55 PM     11/09/2022 02:55 PM       CMP:   Lab Results   Component Value Date/Time     11/09/2022 02:55 PM    K 4.3 11/09/2022 02:55 PM     11/09/2022 02:55 PM    CO2 25 11/09/2022 02:55 PM    BUN 14 11/09/2022 02:55 PM    CREATININE 0.7 11/09/2022 02:55 PM    GFRAA >60 10/06/2022 03:20 PM    LABGLOM >60 11/09/2022 02:55 PM    GLUCOSE 172 11/09/2022 02:55 PM    PROT 7.2 12/02/2014 09:16 PM    CALCIUM 9.1 11/09/2022 02:55 PM    ALKPHOS 103 12/02/2014 09:16 PM    AST 20 12/04/2014 01:08 AM    ALT 34 12/02/2014 09:16 PM       POC Tests:   Recent Labs     11/11/22  0949   POCGLU 140*       Coags:   Lab Results   Component Value Date/Time    PROTIME 13.1 12/02/2014 09:16 PM    INR 1.02 12/02/2014 09:16 PM       HCG (If Applicable): No results found for: PREGTESTUR, PREGSERUM, HCG, HCGQUANT     ABGs: No results found for: PHART, PO2ART, YJS2NBJ, XFR2WCT, BEART, G2YTTQPJ     Type & Screen (If Applicable):  No results found for: LABABO, LABRH    Drug/Infectious Status (If Applicable):  No results found for: HIV, HEPCAB    COVID-19 Screening (If Applicable): No results found for: COVID19        Anesthesia Evaluation  Patient summary reviewed and Nursing notes reviewed no history of anesthetic complications:   Airway: Mallampati: II  TM distance: >3 FB   Neck ROM: full  Mouth opening: > = 3 FB   Dental:          Pulmonary:normal exam    (+) current smoker          Patient smoked on day of surgery. Cardiovascular:    (+) hypertension:, CAD:, CABG/stent:, hyperlipidemia      ECG reviewed        Stress test reviewed       Beta Blocker:  Dose within 24 Hrs         Neuro/Psych:      (-) seizures and CVA           GI/Hepatic/Renal:   (+) GERD: well controlled,           Endo/Other:    (+) DiabetesType II DM, , .                 Abdominal:             Vascular: negative vascular ROS. Other Findings:           Anesthesia Plan      general and TIVA     ASA 3       Induction: intravenous. MIPS: Postoperative opioids intended and Prophylactic antiemetics administered. Anesthetic plan and risks discussed with patient.                         Bethanie Arguello MD   11/11/2022

## 2022-11-11 NOTE — CONSULTS
Radiation Oncology Consult Note    Requesting MD:  Steven Rollins MD Admit Status:         History obtained from:   [x] Patient  [x] Other (specify): chart    CC: Infiltrating ductal carcinoma of left breast    HPI: Ms. Madai Sewell is a 78 y.o. female with a history of abnormal screening mammogram 2022 showing a 1.7 x 1.3 cm mass in the left breast lower inner quadrant. Subsequent ultrasound showed a hypoechoic lesion measuring 1.8 x 1.1 x 1.7 cm. Core needle biopsies of the left breast obtained 2022 showed invasive ductal carcinoma, grade 1 adjacent to and involving a fibroadenoma (approximately 1.3 cm in greatest linear extent). ER and MN were both positive at 96% and 67%, respectively. Ki-67 was 6% and HER2/ravi was equivocal.    Treatment options have been discussed with the patient Dr. Dhruv Kasper and she has elected to proceed with left breast lumpectomy, left axillary sentinel lymph node biopsy and consideration of intraoperative radiation therapy. Past Medical History:   Diagnosis Date    Breast lump     CAD (coronary artery disease)     hx of stent; sees [facility] cardiology    Diabetes mellitus ([facility]     GERD (gastroesophageal reflux disease)     History of blood transfusion     after c section    HTN (hypertension)     Hyperlipidemia     Malignant neoplasm of overlapping sites of left breast in female, estrogen receptor positive ([facility] 10/10/2022    Smoker     Thyroid disease     hx of surgery, no meds     Menstrual history: The patient is G2, P2, Ab0. Menarche was at age 16. Menopause was at age 37. The patient did experience significant hot flashes following menopause. The patient did receive hormone replacement therapy for only 3 or 4 years. .    Past Surgical History:   Procedure Laterality Date    CARDIAC CATHETERIZATION  12/3/14  MDL    stent to LAD.  EF 60%     SECTION      COLONOSCOPY      HERNIA REPAIR      umbilical    THYROID SURGERY      partial    TONSILLECTOMY AND ADENOIDECTOMY      US BREAST NEEDLE BIOPSY LEFT Left 9/26/2022    US BREAST NEEDLE BIOPSY LEFT LPS GENERAL SURGERY       Family History   Problem Relation Age of Onset    Heart Attack Father     Breast Cancer Paternal Aunt 36    Cervical Cancer Paternal Aunt 34    Diabetes Maternal Grandmother     Diabetes Paternal Grandmother        Social History     Socioeconomic History    Marital status: Legally      Spouse name: Not on file    Number of children: Not on file    Years of education: Not on file    Highest education level: Not on file   Occupational History    Not on file   Tobacco Use    Smoking status: Every Day     Packs/day: 1.00     Types: Cigarettes    Smokeless tobacco: Never   Vaping Use    Vaping Use: Never used   Substance and Sexual Activity    Alcohol use: No    Drug use: No    Sexual activity: Not on file   Other Topics Concern    Not on file   Social History Narrative    Not on file     Social Determinants of Health     Financial Resource Strain: Not on file   Food Insecurity: Not on file   Transportation Needs: Not on file   Physical Activity: Not on file   Stress: Not on file   Social Connections: Not on file   Intimate Partner Violence: Not on file   Housing Stability: Not on file       Allergies   Allergen Reactions    Hydrocodone Other (See Comments)     Severe headache, \"I couldn't move\"    Sulfa Antibiotics Itching       CURRENT MEDICATIONS:   ceFAZolin  2,000 mg IntraVENous On Call to OR       ROS:  Negative except for paresthesias involving both lower extremities. PE:  Vitals:    11/11/22 0947   BP: (!) 196/84   Pulse: 76   Resp: 14   Temp: 97.3 °F (36.3 °C)   SpO2: 97%     No intake or output data in the 24 hours ending 11/11/22 1102  Estimated body mass index is 22.83 kg/m² as calculated from the following:    Height as of this encounter: 5' 4\" (1.626 m). Weight as of this encounter: 133 lb (60.3 kg).     General Appearance:    Alert, oriented, cooperative, no distress, appears stated age   Head:    Normocephalic, without obvious abnormality, atraumatic   Eyes:    PERRL, conjunctiva/corneas clear, EOM's intact            Nose:   Nares normal, septum midline, mucosa normal, no drainage or sinus tenderness   Throat:   Lips, mucosa, and tongue normal; Teeth: Multiple teeth are missing and remaining teeth are in poor repair, gums normal   Neck:   Supple, symmetrical, trachea midline, no adenopathy; Thyroid:  no enlargement/tenderness/nodules; No carotid    bruit or JVD   Back:     Symmetric, no curvature, ROM normal, no CVA tenderness   Lungs:     Clear to auscultation and percussion, respirations unlabored   Chest wall:    No tenderness or deformity   Heart:    Breasts:    Regular rate and rhythm, S1 and S2 normal; no murmur, rub   or gallop    Deferred until examination under anesthesia prior to planned   lumpectomy and axillary sentinel lymph node biopsy. Abdomen:     Soft, non-tender, no masses, no organomegaly           Extremities:   Extremities normal, atraumatic, no clubbing, cyanosis or   edema               Neurologic: Decreased sensation to light touch in the distal lower extremities bilaterally       LAB RESULTS:  Recent Results (from the past 24 hour(s))   POCT Glucose    Collection Time: 11/11/22  9:49 AM   Result Value Ref Range    POC Glucose 140 (H) 70 - 99 mg/dl    Performed on [employer]        IMAGING:  US BREAST LIMITED LEFT    Result Date: 11/10/2022  EXAMINATION: US BREAST LIMITED LEFT  11/10/2022 8:58 AM HISTORY: Patient is a 79-year-old female with known biopsy-proven left breast malignancy. COMPARISON: All prior breast imaging since July 6, 2022. TECHNIQUE: Multiple longitudinal and transverse sonographic images of the limited left breast were obtained by ultrasound technologist, images presented for radiologist's interpretation.  FINDINGS: In the left breast 3 cm from the nipple at 9:00 is redemonstration of an irregular noncircumscribed hypoechoic

## 2022-11-11 NOTE — DISCHARGE INSTRUCTIONS
1. Keep dressing clean and dry and wear surgibra until follow up appointment  2. Move your arm as tolerated. 3. Sponge bathe only  4. IF YOU HAVE A GILDARDO PELLETIER DRAIN: Measure and record the drainage from the UMESH drain every 12 hours, after wiping the cap. Keep the total of drainage in cc or ounces. When the 24 hour total is less than 1 oz or 30 cc for 1-2 consecutive days, call the office to set up a time for drain removal.  5. Do not drive until cleared by the doctor. You may ride in the car as needed. 6. Pain medication may cause constipation. If you become constipated, you may use one of the following over the counter products, Fleets enema, one bottle of Magnesium Citrate, Ducolax tablets or suppositories, If no results call the office. 7.Call the office with questions during office hours, [phone], in case of an emergency after hours the answering service will take the call. 8. Office follow up in 1 week, if appointment was not made while at the hospital.  9. Call 1 hour before your appointment to see if office is running on time. 10. Call the office for your pathology report 3 days after your surgery            Surgical Drain Care: Care Instructions  Your Care Instructions     After a surgery, fluid may collect inside your body in the surgical area. This makes an infection or other problems more likely. A surgical drain allows the fluid to flow out. The doctor puts a thin, flexible rubber tube into the area of your body where the fluid is likely to collect. The rubber tube carries the fluid outside your body. The most common type of surgical drain carries the fluid into a collection bulb that you empty. This is called a Gildardo-Pelletier (UMESH) drain. The drain uses suction created by the bulb to pull the fluid from your body into the bulb. The rubber tube will probably be held in place by one or two stitches in your skin.  The bulb will probably be attached with a safety pin to your clothes or near the bandage so that it doesn't flip around or pull on the stitches. Another type of drain is called a Penrose drain. This type of drain doesn't have a bulb. Instead, the end of the tube is open. That allows the fluid to drain onto a dressing taped to your skin. The drain may be kept in place next to your skin with a stitch or a safety pin in the tube. When you first get the drain, the fluid will be bloody. It will change color from red to pink to a light yellow or clear as the wound heals and the fluid starts to go away. Your doctor may give you information on when you no longer need the drain and when it will be removed. Follow-up care is a key part of your treatment and safety. Be sure to make and go to all appointments, and call your doctor if you are having problems. It's also a good idea to know your test results and keep a list of the medicines you take. How do you empty the bulb of a Gildardo-Pelletier drain? Follow any instructions your doctor gives you. How often you empty the bulb depends on how much fluid is draining. Empty the bulb when it is half full. Wash your hands with soap and water. Take the plug out of the bulb. Empty the bulb. If your doctor asks you to measure the fluid, empty the fluid into a measuring cup, and write down the color and how much you collected. Your doctor will want to know this information. Clean the plug with alcohol. Squeeze the bulb until it is flat. This removes all the air from the bulb. You may need to put the bulb on a table or a counter to flatten it. Keep the bulb flat, and put the plug in. The bulb should stay flat after you put the plug back in. This creates the suction that pulls the fluid into the bulb. Empty the fluid into the toilet. Wash your hands. How do you change the dressing around your surgical drain? You may have a dressing (bandage). The dressing is often made of gauze pads held on with tape.  Your doctor will tell you how often to change it.  Wash your hands with soap and water. Take off the dressing from around the drain. Clean the drain site and the skin around it with soap and water. Use gauze or a cotton swab. When the site is dry, put on a new dressing. The way your dressing is put on depends on what kind of drain you have. You will get instructions for your type of drain. Wash your hands again with soap and water. Your doctor may ask you to keep track of your dressing changes. Write down the time of day and the amount and color of the fluid on the dressing. How do you help prevent clogs in your surgical drain? Squeezing or \"milking\" the tube of your surgical drain can help prevent clogs so that it drains correctly. Your doctor will tell you when you need to do this. In general, you do this when: You see a clot in the tube that prevents fluid from draining. The clot may look like a dark, stringy lining. You see fluid leaking around the tube where it goes into the skin. Follow these steps for milking the tube. Use one hand to hold and pinch the tube where it leaves the skin. With the thumb and first finger of your other hand, pinch the tube just below where you're holding it. Slowly and firmly push your thumb and first finger down the tubing toward the end of the tube. Repeat this as many times as needed to move the clot. If you have a Gildardo-Pelletier (UMESH) drain, the clot should move down the tube and into the bulb. If you have a Penrose drain, the clot should move into the dressing. When should you call for help? Call your doctor now or seek immediate medical care if:    You have signs of infection, such as: Increased pain, swelling, warmth, or redness around the area. Red streaks leading from the area. Pus draining from the area. A fever. You see a sudden change in the color or smell of the drainage. The tube is coming loose where it leaves your skin.    Watch closely for changes in your health, and be sure to contact your doctor if:    You see a lot of fluid around the drain. You cannot remove a clot from the tube by milking the tube. Where can you learn more? Go to https://[employer]Hello[employer]. org and sign in to your [employer] account. Enter K117 in the [facility] box to learn more about \"Surgical Drain Care: Care Instructions. \"     If you do not have an account, please click on the \"Sign Up Now\" link. Current as of: January 20, 2022               Content Version: 13.4  © 6720-6496 Healthwise, Incorporated. Care instructions adapted under license by [facility] ([facility]). If you have questions about a medical condition or this instruction, always ask your healthcare professional. [address] any warranty or liability for your use of this information.

## 2022-11-11 NOTE — OP NOTE
Dictated    Operation performed with curative intent: Yes  Tracer(s) used to identify sentinel nodes in the upfront surgery (nonneoadjuvant) setting (select all that apply) : Radioactive tracer  Tracer(s) used to identify sentinel nodes in the neoadjuvant setting (select all that apply): Not Applicable  All nodes (colored or noncolored) present at the end of a dye-filled lymphatic channel were removed: Not Applicable  All significantly radioactive nodes were removed:  Yes  All palpably suspicious nodes were removed: Yes  Biopsy-proven positive nodes marked with clips prior to chemotherapy were identified and removed: Not Applicable

## 2022-11-11 NOTE — OP NOTE
Operative Note        Radiation Oncology      Patient's Name/Date of Birth:    Jose L Sanchez / 1955 (12 y.o.)    DATE OF OPERATION:    11/11/2022    SURGEON:    Padilla Collier MD  Phone Number: [phone]    OTHER SURGEONS:      Surgeon(s):  MD Padilla De La Torre MD Jefferson Rana, MD    PREOPERATIVE DIAGNOSIS:    Malignant neoplasm of overlapping sites of left breast in female, estrogen receptor positive ([facility] [C50.812, Z17.0]    POSTOPERATIVE DIAGNOSIS:    C50.812    BRIEF HISTORY:    Ms. Terri Emmanuel is a 78 y.o. female with a history of abnormal screening mammogram 8/9/2022 showing a 1.7 x 1.3 cm mass in the left breast lower inner quadrant. Subsequent ultrasound showed a hypoechoic lesion measuring 1.8 x 1.1 x 1.7 cm. Core needle biopsies of the left breast obtained 9/26/2022 showed invasive ductal carcinoma, grade 1 adjacent to and involving a fibroadenoma (approximately 1.3 cm in greatest linear extent). ER and RI were both positive at 96% and 67%, respectively. Ki-67 was 6% and HER2/ravi was equivocal.     Treatment options have been discussed with the patient Dr. Pablo Yoder and she has elected to proceed with left breast lumpectomy, left axillary sentinel lymph node biopsy and consideration of intraoperative radiation therapy. STAGE: IA (T1c N0 M0)    OPERATION PERFORMED:    1. Left partial mastectomy and sentinel lymph node biopsy of left axilla. 2. Ultrasound examination of the left breast following insertion of the Xoft balloon applicator into the lumpectomy cavity. 3. Treatment planning. 4. Intraoperative irradiation using the Xoft electronic brachytherapy system. DESCRIPTION OF PROCEDURE:    Under general anesthesia, following prepping and draping in the usual sterile manner, left partial mastectomy and left sentinel lymph node biopsy were performed by Dr. Pablo Yoder. This will be dictated separately.     A 3-4 cm Xoft balloon applicator was inserted into the left breast lumpectomy cavity and filled with 45 cc sterile saline. The breast tissue and overlying skin were approximated using suture material. Ultrasound examination showed a minimum skin bridge of 0.6 cm. Saline was injected intradermally by Dr. Negrito Cutler to increase the skin bridge to 1.3 cm as documented by ultrasound. The miniature x-ray tube (with a measured length of 25.05 cm) was inserted into the central channel of the balloon applicator (with a measured depth of 25.05 cm) and a pull back test of the source was successfully performed. A thin, flexible lead shield was placed on the breast overlying the implant site. Rolling, leaded-glass shields were brought into the operating room and the room was evacuated except for the anesthesiologist, medical physicist and radiation oncologist. Radiation precaution signs were placed on the operating room entrances. The source was activated and a dose of 2000 cGy was delivered to the balloon surface in 666.6 seconds. Treatment started 11/11/2022 at 12:00:22 PM and was completed 11/11/2022 at 12:11:50 PM.    Following the treatment, the x-ray source was withdrawn and placed in a shielded chamber in the Xoft controller unit. The flexible lead shield was removed from the patient. Removal of the balloon applicator was performed. The volume of saline removed from the balloon applicator was verified. Closing of the lumpectomy and axillary incisions was performed by Dr. Negrito Cutler and also the subject of a separate dictation.     Physics support, including machine calibration and , was performed by Luisa Garza PhD.    Elvis Simpson MD         Electronically signed by Qamar Horta MD on 47/05/3430 at 12:53 PM

## 2022-11-11 NOTE — ANESTHESIA POSTPROCEDURE EVALUATION
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     Bronwyn Phase I: Bronwyn Score: 5    Bronwyn Phase II:        Anesthesia Post Evaluation    Patient location during evaluation: PACU  Patient participation: complete - patient participated  Level of consciousness: sleepy but conscious  Pain score: 3  Airway patency: patent  Nausea & Vomiting: no nausea and no vomiting  Complications: no  Cardiovascular status: hemodynamically stable, blood pressure returned to baseline and hypertensive  Respiratory status: acceptable, spontaneous ventilation, nonlabored ventilation and face mask  Hydration status: stable

## 2022-11-12 LAB
EKG P AXIS: 69 DEGREES
EKG P-R INTERVAL: 180 MS
EKG Q-T INTERVAL: 404 MS
EKG QRS DURATION: 98 MS
EKG QTC CALCULATION (BAZETT): 411 MS
EKG T AXIS: 75 DEGREES

## 2022-11-12 PROCEDURE — 93010 ELECTROCARDIOGRAM REPORT: CPT | Performed by: INTERNAL MEDICINE

## 2022-11-12 NOTE — OP NOTE
GUILLERMINA [employer] [facility] BRENDAN [address] [facility]                                OPERATIVE REPORT    PATIENT NAME: Karol Marina                    :        1955  MED REC NO:   [id number]                              ROOM:  ACCOUNT NO:   [de-identified]                           ADMIT DATE: 2022  PROVIDER:     Tae Alvarez MD    DATE OF PROCEDURE:  2022    PREOPERATIVE DIAGNOSIS:  Left breast cancer. POSTOPERATIVE DIAGNOSIS:  Left breast cancer. PROCEDURE PERFORMED:  1. Injection of radionuclide. 2.  Lymphatic mapping. 3.  Ultrasound-guided needle localization. 4.  Placement of needle. 5.  Left pectoral block - 6 levels. 6.  Left partial mastectomy. 7.  Left sentinel lymph node biopsy. 8.  Utilization of MarginProbe intraoperative margin assessment device. 9.  Preparation of tumor cavity site for IORT. 10.  Placement of IORT catheter. 11.  Placement of three-dimensional implant for tissue filling, for  radiation targeting as well as future mammographic visualization. SURGEON:  Tae Alvarez MD    ASSISTANT:  Gallito Velazquez PA-C. He was present for all portions of  the case including all critical portions as well as closure. ANESTHESIA:  Pectoral block with MAC. INDICATIONS:  The patient is a very pleasant 61-year-old lady, recently  diagnosed with a cancer in the left breast.  We discussed the risks and  benefits of lumpectomy and sentinel node biopsy. She understood and was  agreeable. OPERATIVE PROCEDURE:  Today, she was brought to the operating room and  was adequately sedated. We then injected the 500 microcuries of  Lymphoseek in the left periareolar dermis. We then used ultrasound to  identify the lesion, inserted a 5 cm Kopans wire directly through the  lesion. It was very close to the skin surface. So, it was clear that  the skin would have to be excised over it.   We then proceeded with our  pectoral block. We prepped the skin with chlorhexidine and then used a  solution of 50 mL of 0.2% ropivacaine, 50 mL of normal saline and 8 mg  of Decadron. We used ultrasound guidance to inject the intercostal  perforators at the lateral sternal border. We did 6 levels. We then  injected the inframammary crease and the infraclavicular area. We then  went laterally and used ultrasound once more to inject the interpectoral  space and the lateral intercostals at the level of the serratus. Again  did 6 levels laterally. Once this was done we re-prepped, re-draped,  and made an elliptical incision over the lesion. We dissected down to  the chest wall. We marked cranial, caudal, medial and lateral margins  on our specimen within it in situ, then we completed our excision and  placed a deep marker. We then did specimen ultrasound. It showed that  the caudal margin was close at about 2 mm. We then utilized the  MarginProbe intraoperative margin assessment device and it was  essentially positive on all sides. Because of this, we re-excised the  caudal margin, cranial margin, the medial and lateral and the deep  margins. We then turned our attention to the axilla. We had previously  injected 3 mL of isosulfan blue in the left biceps crease to facilitate  axillary reverse mapping. We then entered the axilla with the Neoprobe,  identified a hot area of lymph node tissue at about 200 to 300 counts. As we began our excision using _____ hemoclips and the Bovie  electrocautery, we encountered a blue node that was hot at about 50  counts and felt we should remove it. There was no significant residual  radiation, no significant palpable residual.  We irrigated copiously. We then turned our attention back to the primary wound. We utilized a  24-Mauritian Parker catheter to size the wound. It appeared 45 mL would be  about right.   We obtained the appropriate catheter, filled it with 45 mL  of saline, checked for symmetry which was good, checked for balloon  integrity which was good, then placed a 2-0 Prolene pursestring around  our cavity. We inserted the balloon, tied down our pursestring. It fit  quite nicely. On the medial side, the skin to balloon distance was only  7 mm. So, we injected some saline into the subcutaneous on this side  which increased it to 1.1 cm. The other directions were all fine. I  reviewed the specimen with pathology. It appeared that our margins were  excellent. She then underwent an uncomplicated radiation therapy. Once  this was done, we removed the catheter, took out our pursestring,  irrigated copiously, obtained good hemostasis, placed a  three-dimensional implant in the tumor bed and sutured it in place with  3-0 Vicryl sutures. We then reapproximated the breast parenchyma with  2-0 and 3-0 Vicryl and 4-0 Stratafix for the skin. A Prineo dressing  was applied. Estimated blood loss, minimal.  Complications, none. She  tolerated the procedure well.         William Schwartz MD    D: 11/11/2022 14:28:31      T: 11/11/2022 22:58:36     [email or web address]  Job#: [id number]     Doc#: [id number]    CC:

## 2022-11-15 NOTE — PROGRESS NOTES
HISTORY OF PRESENT ILLNESS:    Ms. Terri Emmanuel presents for a one week post op breast check. This is following left partial mastectomy with left sentinel lymph node biopsy with IORT on 11/11/2022. She is recently status post ultrasound guided breast biopsy  on the left which revealed a 1.3 cm low grade invasive ductal carcinoma. ER positive at 96%. MT positive at 67%. Her2 is Equivocal. Ki67 is 6%. Mammaprint is low risk luminal type A. MRI-10/6/2022     EXAM REASON: Patient is a 56-year-old female with biopsy-proven breast   cancer involving the left breast.   COMPARISON:    Outside imaging dated July 6, 2022, August 9, 2022, and internal   breast biopsy and post invasive mammogram September 26, 2022. TECHNICAL:    Report: Multiplanar MR imaging of the breasts was performed using a   dedicated breast coil in a Maura 1.0 Gaviota magnet, with and without   contrast. The examination is review on the V[employer]ify workstation in   entirety. FINDINGS:   Some suboptimal imaging of the exam is noted including poor fat   saturation. Background parenchymal enhancement:   Minimal   Fibroglandular tissue:   Scattered   Right breast:   No suspicious MRI finding. Left breast:   In the left breast at 9:00 mid depth is demonstration of a irregular   noncircumscribed heterogeneously enhancing mass measuring 12 x 16 x 18   mm in length by with fat height. The signal void most consistent with   a biopsy clip is noted to be at the very superior extent of the mass. There is no additional suspicious finding. Lymph nodes:   No abnormal axillary or internal mammary chain lymph nodes are seen. No worrisome extramammary findings seen. Impression   1. Left breast, BI-RADS 6, biopsy-proven malignancy involving an 18 mm   mass at 9:00, with biopsy clip at the far superior extent of the mass. 2.  No suspicious findings seen in the right breast.   Overall assessment, BI-RADS 6, biopsy-proven malignancy.      PATHOLOGY REVEALS:  FINAL DIAGNOSIS:     A. Breast, left lumpectomy:   1. Infiltrating ductal carcinoma, no special type, grade 1.   2.  Infiltrating carcinoma measures 1.8 cm in greatest linear dimension. 3.  Infiltrating carcinoma extends to within 0.1 cm of the nearest caudal   surgical excision margin. 4.  Focal lymphovascular space invasion is identified. 5.  Sections of skin, negative for evidence of malignancy. B.  Breast, excision of additional left breast cranial margin: Benign   breast parenchyma. C.  Breast, excision of additional left breast caudal margin: Benign   breast parenchyma. D.  Breast, excision of additional left breast lateral margin: Benign   breast parenchyma with focal sclerosing adenosis. E.  Breast, excision of additional left breast medial margin: Benign   breast parenchyma. F.  Breast, excision of additional left breast deep margin: Benign breast   parenchyma. G.  Lymph node, left sentinel lymph node biopsy:   1.  1 out of 2 lymph nodes positive for metastatic adenocarcinoma. 2.  Metastatic focus measures 0.4 cm in greatest dimension. 3.  Negative for evidence of extracapsular spread. AJCC STAGE:  pT1c, pN1a, pMx     PHYSICAL EXAM:  The  wounds look good with no evidence of infection, fluid accumulation, or skin necrosis. UMESH drain was not removed       IMPRESSION:    Doing well s/p left partial mastectomy with left sentinel lymph node biopsy with IORT    PLAN: See [facility] in one week to remove drain. Refer to Dr. Sejal Patiño to see in 3 weeks. I will see her back in the office in one month. I have seen, examined and reviewed this patient medication list, appropriate labs and imaging studies. I reviewed relevant medical records and others physicians notes. I discussed the plans of care with the patient. I answered all the questions to the patients satisfaction.   I, Dr Claire Mason, personally performed the services described in this documentation as scribed by Latonya Quinteros MA in my presence and is both accurate and complete. (Please note that portions of this note were completed with a voice recognition program. Efforts were made to edit the dictations but occasionally words are mis-transcribed.)  Over 50% of the total visit time of 20 minutes in face to face encounter with the patient, out of which more than 50% of the time was spent in counseling patient or family and coordination of care. Counseling included but was not limited to time spent reviewing labs, imaging studies/ treatment plan and answering questions.

## 2022-11-16 ENCOUNTER — OFFICE VISIT (OUTPATIENT)
Dept: SURGERY | Age: 67
End: 2022-11-16

## 2022-11-16 VITALS — SYSTOLIC BLOOD PRESSURE: 138 MMHG | HEART RATE: 76 BPM | DIASTOLIC BLOOD PRESSURE: 82 MMHG

## 2022-11-16 DIAGNOSIS — Z98.890 STATUS POST LEFT BREAST LUMPECTOMY: ICD-10-CM

## 2022-11-16 DIAGNOSIS — C50.912 BREAST CANCER METASTASIZED TO AXILLARY LYMPH NODE, LEFT (HCC): ICD-10-CM

## 2022-11-16 DIAGNOSIS — C50.812 MALIGNANT NEOPLASM OF OVERLAPPING SITES OF LEFT BREAST IN FEMALE, ESTROGEN RECEPTOR POSITIVE (HCC): Primary | ICD-10-CM

## 2022-11-16 DIAGNOSIS — Z17.0 MALIGNANT NEOPLASM OF OVERLAPPING SITES OF LEFT BREAST IN FEMALE, ESTROGEN RECEPTOR POSITIVE (HCC): Primary | ICD-10-CM

## 2022-11-16 DIAGNOSIS — C77.3 BREAST CANCER METASTASIZED TO AXILLARY LYMPH NODE, LEFT (HCC): ICD-10-CM

## 2022-11-16 PROCEDURE — 99024 POSTOP FOLLOW-UP VISIT: CPT | Performed by: SURGERY

## 2022-11-21 PROBLEM — C50.912 BREAST CANCER METASTASIZED TO AXILLARY LYMPH NODE, LEFT (HCC): Status: ACTIVE | Noted: 2022-11-21

## 2022-11-21 PROBLEM — C77.3 BREAST CANCER METASTASIZED TO AXILLARY LYMPH NODE, LEFT (HCC): Status: ACTIVE | Noted: 2022-11-21

## 2022-11-21 PROBLEM — Z98.890 STATUS POST LEFT BREAST LUMPECTOMY: Status: ACTIVE | Noted: 2022-11-21

## 2022-11-28 ENCOUNTER — TELEPHONE (OUTPATIENT)
Dept: SURGERY | Age: 67
End: 2022-11-28

## 2022-11-28 NOTE — TELEPHONE ENCOUNTER
Pt called to schedule appt with Amaya Katz, was able to schedule, however per Dr. Tiera Mcclain last appt notes with pt he wanted pt to follow up for a 1 mo exam following initial post op w/Dr. Delia Shaffer. I'm unable to schedule pt within time frame.  Please contact pt to discuss, best time to call is after 3 pm.    Thank you

## 2022-11-30 ENCOUNTER — OFFICE VISIT (OUTPATIENT)
Dept: SURGERY | Age: 67
End: 2022-11-30

## 2022-11-30 VITALS
WEIGHT: 138.6 LBS | TEMPERATURE: 97.7 F | OXYGEN SATURATION: 93 % | HEART RATE: 65 BPM | HEIGHT: 64 IN | BODY MASS INDEX: 23.66 KG/M2

## 2022-11-30 DIAGNOSIS — Z17.0 MALIGNANT NEOPLASM OF OVERLAPPING SITES OF LEFT BREAST IN FEMALE, ESTROGEN RECEPTOR POSITIVE (HCC): Primary | ICD-10-CM

## 2022-11-30 DIAGNOSIS — C50.812 MALIGNANT NEOPLASM OF OVERLAPPING SITES OF LEFT BREAST IN FEMALE, ESTROGEN RECEPTOR POSITIVE (HCC): Primary | ICD-10-CM

## 2022-11-30 PROCEDURE — 99024 POSTOP FOLLOW-UP VISIT: CPT | Performed by: PHYSICIAN ASSISTANT

## 2022-11-30 NOTE — PROGRESS NOTES
Subjective  Prudence Willson is status post left lumpectomy with sentinel node biopsy with intraoperative radiation therapy. She comes today for her Gildardo-Pelletier removal.        Objective  Patient Active Problem List    Diagnosis Date Noted    Status post left breast lumpectomy with SNB and IORT-11/11/2022 11/21/2022    Breast cancer metastasized to axillary lymph node, left ([facility] 11/21/2022    Malignant neoplasm of overlapping sites of left breast in female, estrogen receptor positive ([facility] 10/10/2022    Numbness     Angina pectoris, crescendo ([facility] 06/12/2018    Tobacco abuse     Hypotension 01/14/2015    CAD (coronary artery disease)     HTN (hypertension)     Hyperlipidemia     Diabetes mellitus (HCC)        Current Outpatient Medications   Medication Sig Dispense Refill    oxyCODONE-acetaminophen (PERCOCET)  MG per tablet Take 1 tablet by mouth every 6 hours as needed for Pain. 10 tablet 0    aspirin 81 MG EC tablet Take 81 mg by mouth daily      mupirocin (BACTROBAN) 2 % ointment Apply to each nostril BID x 5 days prior to surgery.  1 each 0    TRULICITY 6.93 OD/9.7AE SOPN once a week      isosorbide mononitrate (IMDUR) 30 MG extended release tablet Take 1 tablet by mouth daily 30 tablet 5    furosemide (LASIX) 40 MG tablet Take 40 mg by mouth daily as needed      glipiZIDE (GLUCOTROL) 10 MG tablet Take 10 mg by mouth 2 times daily (before meals)      nitroGLYCERIN (NITROSTAT) 0.4 MG SL tablet Place 1 tablet under the tongue every 5 minutes as needed for Chest pain 25 tablet 3    metFORMIN (GLUCOPHAGE) 1000 MG tablet Take 1 tablet by mouth 2 times daily (with meals) 60 tablet 3    amitriptyline (ELAVIL) 100 MG tablet Take 100 mg by mouth nightly      oxyCODONE-acetaminophen (PERCOCET)  MG per tablet Take 1 tablet by mouth 3 times daily      metoprolol (LOPRESSOR) 50 MG tablet Take 1 tablet by mouth 2 times daily 60 tablet 0    gabapentin (NEURONTIN) 600 MG tablet Take 1 tablet by mouth 4 times to see the patient back in the office in approximately 3 weeks with Dr. Taurus Rios.   She is scheduled to see her medical oncologist.

## 2022-12-07 DIAGNOSIS — Z17.0 MALIGNANT NEOPLASM OF OVERLAPPING SITES OF LEFT BREAST IN FEMALE, ESTROGEN RECEPTOR POSITIVE (HCC): Primary | ICD-10-CM

## 2022-12-07 DIAGNOSIS — C50.812 MALIGNANT NEOPLASM OF OVERLAPPING SITES OF LEFT BREAST IN FEMALE, ESTROGEN RECEPTOR POSITIVE (HCC): Primary | ICD-10-CM

## 2022-12-07 NOTE — PROGRESS NOTES
MEDICAL ONCOLOGY CONSULTATION    Pt Name: Stephani Kelly  MRN: [id number]  YOB: 1955  Date of evaluation: 12/8/2022    REASON FOR CONSULTATION:  Breast cancer  REQUESTING PHYSICIAN: Dr Dayne Carlisle    History Obtained From:  patient and old medical records    HISTORY OF PRESENT ILLNESS:    Diagnosis  Invasive ductal carcinoma, left breast, Sept 2022  Grade 1  ER 96%, CA 67%, HER-2 2+/equivocal, FISH-negative, Ki67 6%  MammaPrint: Luminal A/low risk  pT1c, pN1a, cM0, stage IA  [facility]vel[facility] 81 gene genetic panel: Negative for pathogenic mutations    Treatment Summary  11/11/22 Left breast lumpectomy with positive SLNB (1/2) by Dr Dayne Carlisle  11/11/22 IORT 2000 cGy left breast  Anticipate chemotherapy  Anticipate additional radiation therapy  Anticipate endocrine hormone therapy    Cancer History  Artem Cottrell was first seen by me on 12/8/2022. She had a screening detected breast lesion. She was referred to Dr. Nilsa Navarro for further work-up. She is also a longtime smoker and undergoes CT for lung cancer screening. 7/6/22 Bilateral screening mammogram ([address] [place] [address]): Somewhat ill-defined slightly spiculated mass lower inner quadrant left breast.    7/6/22 CT low dose lung cancer screening ([address] [place] [address]): Lung Rad 1 - Negative. Recommendation: Annual LDCT Screening. Incidental findings as described above: Left breast nodule. Recommend diagnostic mammography. Anterior abdominal wall hernia. Correlate clinically. 8/9/22 Left diagnostic mammogram ([address] [place] [address]): There is a 1.7 x 1.3 cm mass in the lower inner quadrant of the left breast which persists with compression imaging. This has partially obscured, spiculated margins. 8/9/22 US left breast/axilla ([address] [place] [address]): Ultrasound shows a 1.8 x 1.1 x 1.7 cm mass at the o'clock left breast, 4.0 cm from the nipple.  This is heterogeneously hypoechoic with posterior shadowing. Margins are microlobulated and slightly spiculated. There is internal blood flow on Doppler imaging. Findings are suspicious and ultrasound-guided biopsy is recommended. Imaging of the axilla shows subcentimeter, physiologic-appearing lymph nodes. 9/26/22 Left breast at 9 o'clock position, core biopsies: Invasive ductal carcinoma, grade 1, adjacent to, and involving, a fibroadenoma (approximately 1.3 cm in greatest linear extent). ER 96%, HI 67%, HER-2 2+/equivocal, FISH-negative, Ki67 6%. MammaPrint: Luminal A/low risk. 10/20/22 Evelin 81 gene genetic panel: Negative for pathogenic mutations  10/25/22 MRI bilateral breast: Right breast: No suspicious MRI finding. Left breast: In the left breast at 9:00 mid depth is demonstration of a irregular noncircumscribed heterogeneously enhancing mass measuring 12 x 16 x 18 mm in length by with fat height. The signal void most consistent with a biopsy clip is noted to be at the very superior extent of the mass. There is no additional suspicious finding. Lymph nodes: No abnormal axillary or internal mammary chain lymph nodes are seen. No worrisome extramammary findings seen. 11/10/22 US left breast: In the left breast 3 cm from the nipple at 9:00 is redemonstration of an irregular noncircumscribed hypoechoic mass. Mass was not measured on current exam. There is demonstration of a finding labeled \"clip. \"  11/11/22 Left breast lumpectomy with positive SLNB (1/2) by Dr Tracey Gee  11/11/22 Breast, left lumpectomy:  Infiltrating ductal carcinoma, no special type, grade 1. Infiltrating carcinoma measures 1.8 cm in greatest linear dimension. Infiltrating carcinoma extends to within 0.1 cm of the nearest caudal surgical excision margin. Focal lymphovascular space invasion is identified. Sections of skin, negative for evidence of malignancy. Breast, excision of additional left breast cranial margin: Benign breast parenchyma.  Breast, excision of additional left breast caudal margin: Benign breast parenchyma. Breast, excision of additional left breast lateral margin: Benign breast parenchyma with focal sclerosing adenosis. Breast, excision of additional left breast medial margin: Benign breast parenchyma. Breast, excision of additional left breast deep margin: Benign breast parenchyma. Lymph node, left sentinel lymph node biopsy:  1 out of 2 lymph nodes positive for metastatic adenocarcinoma. Metastatic focus measures 0.4 cm in greatest dimension. Negative for evidence of extracapsular spread. AJCC STAGE:  pT1c, pN1a, pMx.  22 IORT 2000 cGy left breast  2022-she was first seen by me. Essentially, node positive, ER/OH positive HER2 negative breast cancer. Stage Ia. Discussed Oncotype Dx regarding adjuvant chemotherapy. Discussed need for radiation. She has been started on letrozole. She is to continue letrozole. Will obtain bone mineral density. Past Medical History:    Past Medical History:   Diagnosis Date    Breast lump     CAD (coronary artery disease)     hx of stent; sees [facility] cardiology    Diabetes mellitus ([facility] [address]     GERD (gastroesophageal reflux disease)     History of blood transfusion     after c section    HTN (hypertension)     Hyperlipidemia     Malignant neoplasm of overlapping sites of left breast in female, estrogen receptor positive ([facility] [address] 10/10/2022    Smoker     Thyroid disease     hx of surgery, no meds       Past Surgical History:    Past Surgical History:   Procedure Laterality Date    BREAST LUMPECTOMY Left 2022    LEFT LUMPECTOMY & SNB, PREOP & INTRAOP US GUIDED NEEDLE LOC, PEC BLOCK, BIOZORB, FLAPS, MARGINPROBE, & IORT performed by Gracie Irving MD at [place] [address]  12/3/14  MDL    stent to LAD.  EF 60%     SECTION      COLONOSCOPY      HERNIA REPAIR      umbilical    THYROID SURGERY      partial    TONSILLECTOMY AND ADENOIDECTOMY      US BREAST NEEDLE BIOPSY LEFT Left 2022    US BREAST NEEDLE BIOPSY LEFT LPS GENERAL SURGERY       Social History:    Marital status:   Smoking status:Currently; 1 pack daily for 40 years  ETOH status:No  Resides: [place], [place]    Family History:   Family History   Problem Relation Age of Onset    Heart Attack Father     Cancer Sister 62        Lung    Breast Cancer Paternal Aunt 36    Cervical Cancer Paternal Aunt 34    Diabetes Maternal Grandmother     Diabetes Paternal Grandmother        Current Hospital Medications:    Current Outpatient Medications   Medication Sig Dispense Refill    oxyCODONE-acetaminophen (PERCOCET)  MG per tablet Take 1 tablet by mouth every 6 hours as needed for Pain. 10 tablet 0    aspirin 81 MG EC tablet Take 81 mg by mouth daily      mupirocin (BACTROBAN) 2 % ointment Apply to each nostril BID x 5 days prior to surgery. 1 each 0    TRULICITY 3.01 NX/2.9JQ SOPN once a week      isosorbide mononitrate (IMDUR) 30 MG extended release tablet Take 1 tablet by mouth daily 30 tablet 5    furosemide (LASIX) 40 MG tablet Take 40 mg by mouth daily as needed      glipiZIDE (GLUCOTROL) 10 MG tablet Take 10 mg by mouth 2 times daily (before meals)      nitroGLYCERIN (NITROSTAT) 0.4 MG SL tablet Place 1 tablet under the tongue every 5 minutes as needed for Chest pain 25 tablet 3    metFORMIN (GLUCOPHAGE) 1000 MG tablet Take 1 tablet by mouth 2 times daily (with meals) 60 tablet 3    amitriptyline (ELAVIL) 100 MG tablet Take 100 mg by mouth nightly      oxyCODONE-acetaminophen (PERCOCET)  MG per tablet Take 1 tablet by mouth 3 times daily      metoprolol (LOPRESSOR) 50 MG tablet Take 1 tablet by mouth 2 times daily 60 tablet 0    gabapentin (NEURONTIN) 600 MG tablet Take 1 tablet by mouth 4 times daily. .      tamoxifen (NOLVADEX) 20 MG tablet TAKE 1 TABLET BY MOUTH EVERY DAY      montelukast (SINGULAIR) 10 MG tablet TAKE 1 TABLET BY MOUTH EVERY DAY       No current facility-administered medications for this visit.        Allergies: Allergies   Allergen Reactions    Hydrocodone Other (See Comments)     Severe headache, \"I couldn't move\"    Sulfa Antibiotics Itching         Subjective   REVIEW OF SYSTEMS:   CONSTITUTIONAL: no fever, no night sweats, under left armpit pain,weight loss,weight gain,fatigue;  HEENT: impaired vision,no blurring of vision, no double vision, no hearing difficulty, no tinnitus, no ulceration, no dysplasia, no epistaxis;  LUNGS: no cough, no hemoptysis, no wheeze,  no shortness of breath;  CARDIOVASCULAR: no palpitation, no chest pain, no shortness of breath;  GI: heart burn,no abdominal pain, no nausea, no vomiting, no diarrhea, no constipation;  JAMILA: no dysuria, no hematuria, no frequency or urgency, no nephrolithiasis;  MUSCULOSKELETAL: no joint pain, no swelling, no stiffness;  ENDOCRINE: no polyuria, no polydipsia, no cold intolerance,heat intolerance;  HEMATOLOGY: no easy bruising or bleeding, no history of clotting disorder;  DERMATOLOGY: no skin rash, no eczema, no pruritus;  PSYCHIATRY: depression, no anxiety, no panic attacks, no suicidal ideation, no homicidal ideation;  NEUROLOGY: confusion,no syncope, no seizures, no numbness or tingling of hands, no numbness or tingling of feet, no paresis;    Objective   /82   Pulse 73   Ht 5' 4\" (1.626 m)   Wt 137 lb 11.2 oz (62.5 kg)   SpO2 93%   BMI 23.64 kg/m²     PHYSICAL EXAM:  CONSTITUTIONAL: Alert, appropriate, no acute distress  EYES: Non icteric, EOM intact, pupils equal round   ENT: Mucus membranes moist, no oral pharyngeal lesions, external inspection of ears and nose are normal  NECK: Supple, no masses. No palpable thyroid mass  CHEST/LUNGS: CTA bilaterally, normal respiratory effort   CARDIOVASCULAR: RRR, no murmurs. No lower extremity edema  ABDOMEN: soft non-tender, active bowel sounds, no HSM. No palpable masses  EXTREMITIES: warm, full ROM in all 4 extremities, no focal weakness.   SKIN: warm, dry with no rashes or lesions  LYMPH: No cervical, clavicular, axillary, or inguinal lymphadenopathy  NEUROLOGIC: follows commands, non focal   PSYCH: mood and affect appropriate. Alert and oriented to time, place, person      LABORATORY RESULTS REVIEWED/ANALYZED BY ME:  Lab Results   Component Value Date    WBC 9.9 12/08/2022    HGB 14.5 12/08/2022    HCT 45.8 12/08/2022    MCV 96.4 12/08/2022     12/08/2022     Lab Results   Component Value Date    NEUTROABS 5.4 12/08/2022       RADIOLOGY STUDIES REVIEWED BY ME:  As above      ASSESSMENT:    Orders Placed This Encounter   Procedures    DEXA BONE DENSITY 2 SITES     Standing Status:   Future     Standing Expiration Date:   12/8/2023     Order Specific Question:   Reason for exam:     Answer:   Magdalenalachelle Shows while on endocrine therapy    External Referral To Radiation Oncology     Referral Priority:   Routine     Referral Type:   Eval and Treat     Referral Reason:   Specialty Services Required     Requested Specialty:   Radiation Oncology     Number of Visits Requested:   1        Mona Rebolledo was seen today for new patient. Diagnoses and all orders for this visit:    Malignant neoplasm of overlapping sites of left breast in female, estrogen receptor positive ([facility] [address]  -     DEXA BONE DENSITY 2 SITES; Future  -     External Referral To Radiation Oncology    Care plan discussed with patient    Encounter for imaging to assess osteoporosis  -     DEXA BONE DENSITY 2 SITES; Future    Encounter for monitoring aromatase inhibitor therapy  -     DEXA BONE DENSITY 2 SITES;  Future    Smoking hx    At risk for cancer     [id number], stage IA ER+/WV+, Her-2 IHC 2+/FISH negative  The patient was counseled today about diagnosis, staging, prognosis, diagnostic tests, medications, side effects and disease management.   -Status post lumpectomy/sentinel lymph node biopsy with Dr. Lisa Maurer  -Final pathology stage pT1 cN1 M0  -Recommend Oncotype DX for further decision regarding adjuvant chemotherapy  -Recommend to continue with Femara x5 years  -Recommend adjuvant radiation    At risk for lung cancer-the patient undergoes CT lung cancer screening yearly.  -7/6/2022-CT low-dose lung cancer screening, LUNG RAD 1  -She is due for another CT lung cancer screening in July 2022    PLAN:  RTC with MD on 1/5/2023 at 2:30 to discuss results of Oncotype DX  Continue to continue tamoxifen 20 mg p.o. daily  Recommend Lung Screenings annually  Will request Oncotype Dx  Refer radiation therapy in [facility] [place] for  second week on Jan 2023  Recommend Bone Density, anticipated change to aromatase inhibitor  Continue follow-up with Dr Jose Rojas am pre-charting as a registered nurse for Shaka Mcknight MD. Electronically signed by Jay Forrest RN on 12/8/2022 at 5:10 PM CST. Latasha Harris am scribing as Medical Assistant for Shaka Mcknight MD. Electronically signed by Mirian Tsai MA on 12/8/2022 at 1:51 PM CST. I, Dr Martha Arreaga, personally performed the services described in this documentation as scribed by Mirian Tsai MA in my presence and is both accurate and complete. I have seen, examined and reviewed this patient medication list, appropriate labs and imaging studies. I reviewed relevant medical records and others physicians notes. I discussed the plans of care with the patient. I answered all the questions to the patients satisfaction. I have also reviewed the chief complaint (CC) and part of the history (History of Present Illness (HPI), Past Family Social History [facility]), or Review of Systems (ROS) and made changes when appropriated.        (Please note that portions of this note were completed with a voice recognition program. Efforts were made to edit the dictations but occasionally words are mis-transcribed.)  Electronically signed by Shaka Mcknight MD on 12/8/2022 at 2:17 PM         The total time,63 min  I spent to see the patient today includes at least one or more of the following: preparing to see the patient by reviewing prior tests, prior notes or other relevant information, performing appropriate independent examination and evaluation, counseling, ordering of medications, tests or procedures, referrals, , documenting clinic information in the electronic medical record or other health records, independently interpreting results of tests, managing test results and communicating the results to the patient/family or caregiver.

## 2022-12-08 ENCOUNTER — OFFICE VISIT (OUTPATIENT)
Dept: HEMATOLOGY | Age: 67
End: 2022-12-08
Payer: MEDICARE

## 2022-12-08 ENCOUNTER — HOSPITAL ENCOUNTER (OUTPATIENT)
Dept: INFUSION THERAPY | Age: 67
Discharge: HOME | End: 2022-12-08
Payer: MEDICARE

## 2022-12-08 VITALS
OXYGEN SATURATION: 93 % | WEIGHT: 137.7 LBS | SYSTOLIC BLOOD PRESSURE: 130 MMHG | HEIGHT: 64 IN | BODY MASS INDEX: 23.51 KG/M2 | DIASTOLIC BLOOD PRESSURE: 82 MMHG | HEART RATE: 73 BPM

## 2022-12-08 DIAGNOSIS — Z17.0 MALIGNANT NEOPLASM OF OVERLAPPING SITES OF LEFT BREAST IN FEMALE, ESTROGEN RECEPTOR POSITIVE (HCC): ICD-10-CM

## 2022-12-08 DIAGNOSIS — Z17.0 MALIGNANT NEOPLASM OF OVERLAPPING SITES OF LEFT BREAST IN FEMALE, ESTROGEN RECEPTOR POSITIVE (HCC): Primary | ICD-10-CM

## 2022-12-08 DIAGNOSIS — Z79.811 ENCOUNTER FOR MONITORING AROMATASE INHIBITOR THERAPY: ICD-10-CM

## 2022-12-08 DIAGNOSIS — Z87.891 SMOKING HX: ICD-10-CM

## 2022-12-08 DIAGNOSIS — Z51.81 ENCOUNTER FOR MONITORING AROMATASE INHIBITOR THERAPY: ICD-10-CM

## 2022-12-08 DIAGNOSIS — C50.812 MALIGNANT NEOPLASM OF OVERLAPPING SITES OF LEFT BREAST IN FEMALE, ESTROGEN RECEPTOR POSITIVE (HCC): ICD-10-CM

## 2022-12-08 DIAGNOSIS — C50.812 MALIGNANT NEOPLASM OF OVERLAPPING SITES OF LEFT BREAST IN FEMALE, ESTROGEN RECEPTOR POSITIVE (HCC): Primary | ICD-10-CM

## 2022-12-08 DIAGNOSIS — Z91.89 AT RISK FOR CANCER: ICD-10-CM

## 2022-12-08 DIAGNOSIS — Z13.820 ENCOUNTER FOR IMAGING TO ASSESS OSTEOPOROSIS: ICD-10-CM

## 2022-12-08 DIAGNOSIS — Z71.89 CARE PLAN DISCUSSED WITH PATIENT: ICD-10-CM

## 2022-12-08 LAB
BASOPHILS ABSOLUTE: 0.1 K/UL (ref 0–0.2)
BASOPHILS RELATIVE PERCENT: 0.6 % (ref 0–1)
EOSINOPHILS ABSOLUTE: 0.2 K/UL (ref 0–0.6)
EOSINOPHILS RELATIVE PERCENT: 2.1 % (ref 0–5)
HCT VFR BLD CALC: 45.8 % (ref 37–47)
HEMOGLOBIN: 14.5 G/DL (ref 12–16)
IMMATURE GRANULOCYTES #: 0 K/UL
LYMPHOCYTES ABSOLUTE: 3.6 K/UL (ref 1.1–4.5)
LYMPHOCYTES RELATIVE PERCENT: 36.6 % (ref 20–40)
MCH RBC QN AUTO: 30.5 PG (ref 27–31)
MCHC RBC AUTO-ENTMCNC: 31.7 G/DL (ref 33–37)
MCV RBC AUTO: 96.4 FL (ref 81–99)
MONOCYTES ABSOLUTE: 0.6 K/UL (ref 0–0.9)
MONOCYTES RELATIVE PERCENT: 6.1 % (ref 0–10)
NEUTROPHILS ABSOLUTE: 5.4 K/UL (ref 1.5–7.5)
NEUTROPHILS RELATIVE PERCENT: 54.2 % (ref 50–65)
PDW BLD-RTO: 14.2 % (ref 11.5–14.5)
PLATELET # BLD: 264 K/UL (ref 130–400)
PMV BLD AUTO: 11.4 FL (ref 9.4–12.3)
RBC # BLD: 4.75 M/UL (ref 4.2–5.4)
WBC # BLD: 9.9 K/UL (ref 4.8–10.8)

## 2022-12-08 PROCEDURE — 99205 OFFICE O/P NEW HI 60 MIN: CPT | Performed by: INTERNAL MEDICINE

## 2022-12-08 PROCEDURE — G8427 DOCREV CUR MEDS BY ELIG CLIN: HCPCS | Performed by: INTERNAL MEDICINE

## 2022-12-08 PROCEDURE — 4004F PT TOBACCO SCREEN RCVD TLK: CPT | Performed by: INTERNAL MEDICINE

## 2022-12-08 PROCEDURE — 3017F COLORECTAL CA SCREEN DOC REV: CPT | Performed by: INTERNAL MEDICINE

## 2022-12-08 PROCEDURE — 99212 OFFICE O/P EST SF 10 MIN: CPT

## 2022-12-08 PROCEDURE — 1123F ACP DISCUSS/DSCN MKR DOCD: CPT | Performed by: INTERNAL MEDICINE

## 2022-12-08 PROCEDURE — 1090F PRES/ABSN URINE INCON ASSESS: CPT | Performed by: INTERNAL MEDICINE

## 2022-12-08 PROCEDURE — 3074F SYST BP LT 130 MM HG: CPT | Performed by: INTERNAL MEDICINE

## 2022-12-08 PROCEDURE — G8420 CALC BMI NORM PARAMETERS: HCPCS | Performed by: INTERNAL MEDICINE

## 2022-12-08 PROCEDURE — G8400 PT W/DXA NO RESULTS DOC: HCPCS | Performed by: INTERNAL MEDICINE

## 2022-12-08 PROCEDURE — 3078F DIAST BP <80 MM HG: CPT | Performed by: INTERNAL MEDICINE

## 2022-12-08 PROCEDURE — 36415 COLL VENOUS BLD VENIPUNCTURE: CPT | Performed by: INTERNAL MEDICINE

## 2022-12-08 PROCEDURE — G8484 FLU IMMUNIZE NO ADMIN: HCPCS | Performed by: INTERNAL MEDICINE

## 2022-12-08 RX ORDER — TAMOXIFEN CITRATE 20 MG/1
TABLET ORAL
COMMUNITY
Start: 2022-10-10

## 2022-12-08 RX ORDER — MONTELUKAST SODIUM 10 MG/1
TABLET ORAL
COMMUNITY
Start: 2022-10-10

## 2022-12-08 ASSESSMENT — PROMIS GLOBAL HEALTH SCALE
IN GENERAL, WOULD YOU SAY YOUR HEALTH IS...[ON A SCALE OF 1 (POOR) TO 5 (EXCELLENT)]: 4
SUM OF RESPONSES TO QUESTIONS 3, 6, 7, & 8: 14
IN THE PAST 7 DAYS, HOW WOULD YOU RATE YOUR PAIN ON AVERAGE [ON A SCALE FROM 0 (NO PAIN) TO 10 (WORST IMAGINABLE PAIN)]?: 4
IN THE PAST 7 DAYS, HOW WOULD YOU RATE YOUR FATIGUE ON AVERAGE [ON A SCALE FROM 1 (NONE) TO 5 (VERY SEVERE)]?: 2
IN GENERAL, HOW WOULD YOU RATE YOUR SATISFACTION WITH YOUR SOCIAL ACTIVITIES AND RELATIONSHIPS [ON A SCALE OF 1 (POOR) TO 5 (EXCELLENT)]?: 4
IN THE PAST 7 DAYS, HOW OFTEN HAVE YOU BEEN BOTHERED BY EMOTIONAL PROBLEMS, SUCH AS FEELING ANXIOUS, DEPRESSED, OR IRRITABLE [ON A SCALE FROM 1 (NEVER) TO 5 (ALWAYS)]?: 4
IN GENERAL, PLEASE RATE HOW WELL YOU CARRY OUT YOUR USUAL SOCIAL ACTIVITIES (INCLUDES ACTIVITIES AT HOME, AT WORK, AND IN YOUR COMMUNITY, AND RESPONSIBILITIES AS A PARENT, CHILD, SPOUSE, EMPLOYEE, FRIEND, ETC) [ON A SCALE OF 1 (POOR) TO 5 (EXCELLENT)]?: 4
SUM OF RESPONSES TO QUESTIONS 2, 4, 5, & 10: 13
IN GENERAL, HOW WOULD YOU RATE YOUR MENTAL HEALTH, INCLUDING YOUR MOOD AND YOUR ABILITY TO THINK [ON A SCALE OF 1 (POOR) TO 5 (EXCELLENT)]?: 2
TO WHAT EXTENT ARE YOU ABLE TO CARRY OUT YOUR EVERYDAY PHYSICAL ACTIVITIES SUCH AS WALKING, CLIMBING STAIRS, CARRYING GROCERIES, OR MOVING A CHAIR [ON A SCALE OF 1 (NOT AT ALL) TO 5 (COMPLETELY)]?: 5
IN GENERAL, WOULD YOU SAY YOUR QUALITY OF LIFE IS...[ON A SCALE OF 1 (POOR) TO 5 (EXCELLENT)]: 3
IN GENERAL, HOW WOULD YOU RATE YOUR PHYSICAL HEALTH [ON A SCALE OF 1 (POOR) TO 5 (EXCELLENT)]?: 3

## 2022-12-19 ENCOUNTER — TELEPHONE (OUTPATIENT)
Dept: HEMATOLOGY | Age: 67
End: 2022-12-19

## 2022-12-19 NOTE — TELEPHONE ENCOUNTER
ILAN Felix called patient to follow up on previous visit and to complete [facility] Screening. Patient was unavailable at this time and this SW was unable to leave voice message.

## 2022-12-21 ENCOUNTER — TELEPHONE (OUTPATIENT)
Dept: SURGERY | Age: 67
End: 2022-12-21

## 2022-12-21 NOTE — TELEPHONE ENCOUNTER
Andres Arnold requests a call back please, she is needing to r/s her post-op for tomorrow with Dr. Lawerence Phalen, [facility] is unable to accommodate within this calendar year. Please contact patient to discuss. Thank you.

## 2022-12-28 ENCOUNTER — OFFICE VISIT (OUTPATIENT)
Dept: SURGERY | Age: 67
End: 2022-12-28

## 2022-12-28 VITALS
TEMPERATURE: 97.3 F | WEIGHT: 142.4 LBS | HEIGHT: 64 IN | BODY MASS INDEX: 24.31 KG/M2 | HEART RATE: 76 BPM | OXYGEN SATURATION: 91 %

## 2022-12-28 DIAGNOSIS — Z85.3 HISTORY OF BREAST CANCER: Primary | ICD-10-CM

## 2022-12-28 PROCEDURE — 99024 POSTOP FOLLOW-UP VISIT: CPT | Performed by: PHYSICIAN ASSISTANT

## 2022-12-28 NOTE — PROGRESS NOTES
Patient comes today in follow-up from left lumpectomy with sentinel node biopsy. She comes today for breast exam.    Patient Active Problem List    Diagnosis Date Noted    Status post left breast lumpectomy with SNB and IORT-11/11/2022 11/21/2022    Breast cancer metastasized to axillary lymph node, left ([facility] [address] 11/21/2022    Malignant neoplasm of overlapping sites of left breast in female, estrogen receptor positive ([facility] [address] 10/10/2022    Numbness     Angina pectoris, crescendo ([facility] 06/12/2018    Tobacco abuse     Hypotension 01/14/2015    CAD (coronary artery disease)     HTN (hypertension)     Hyperlipidemia     Diabetes mellitus (HCC)      Current Outpatient Medications   Medication Sig Dispense Refill    tamoxifen (NOLVADEX) 20 MG tablet TAKE 1 TABLET BY MOUTH EVERY DAY      montelukast (SINGULAIR) 10 MG tablet TAKE 1 TABLET BY MOUTH EVERY DAY      oxyCODONE-acetaminophen (PERCOCET)  MG per tablet Take 1 tablet by mouth every 6 hours as needed for Pain. 10 tablet 0    aspirin 81 MG EC tablet Take 81 mg by mouth daily      mupirocin (BACTROBAN) 2 % ointment Apply to each nostril BID x 5 days prior to surgery.  1 each 0    TRULICITY 9.06 WZ/5.1TQ SOPN once a week      isosorbide mononitrate (IMDUR) 30 MG extended release tablet Take 1 tablet by mouth daily 30 tablet 5    furosemide (LASIX) 40 MG tablet Take 40 mg by mouth daily as needed      glipiZIDE (GLUCOTROL) 10 MG tablet Take 10 mg by mouth 2 times daily (before meals)      nitroGLYCERIN (NITROSTAT) 0.4 MG SL tablet Place 1 tablet under the tongue every 5 minutes as needed for Chest pain 25 tablet 3    metFORMIN (GLUCOPHAGE) 1000 MG tablet Take 1 tablet by mouth 2 times daily (with meals) 60 tablet 3    amitriptyline (ELAVIL) 100 MG tablet Take 100 mg by mouth nightly      oxyCODONE-acetaminophen (PERCOCET)  MG per tablet Take 1 tablet by mouth 3 times daily      metoprolol (LOPRESSOR) 50 MG tablet Take 1 tablet by mouth 2 times daily 60 tablet 0 gabapentin (NEURONTIN) 600 MG tablet Take 1 tablet by mouth 4 times daily. .       No current facility-administered medications for this visit. Allergies: Hydrocodone and Sulfa antibiotics  Past Medical History:   Diagnosis Date    Breast lump     CAD (coronary artery disease)     hx of stent; sees [facility] cardiology    Diabetes mellitus ([facility] [address]     GERD (gastroesophageal reflux disease)     History of blood transfusion     after c section    HTN (hypertension)     Hyperlipidemia     Malignant neoplasm of overlapping sites of left breast in female, estrogen receptor positive ([facility] [address] 10/10/2022    Smoker     Thyroid disease     hx of surgery, no meds     Past Surgical History:   Procedure Laterality Date    BREAST LUMPECTOMY Left 2022    LEFT LUMPECTOMY & SNB, PREOP & INTRAOP US GUIDED NEEDLE LOC, PEC BLOCK, BIOZORB, FLAPS, MARGINPROBE, & IORT performed by Cassie Calvillo MD at [address] [place] [address]  12/3/14  MDL    stent to LAD. EF 60%     SECTION      COLONOSCOPY      HERNIA REPAIR      umbilical    THYROID SURGERY      partial    TONSILLECTOMY AND ADENOIDECTOMY      US BREAST NEEDLE BIOPSY LEFT Left 2022    US BREAST NEEDLE BIOPSY LEFT LPS GENERAL SURGERY     Family History   Problem Relation Age of Onset    Heart Attack Father     Cancer Sister 62        Lung    Breast Cancer Paternal Aunt 36    Cervical Cancer Paternal Aunt 34    Diabetes Maternal Grandmother     Diabetes Paternal Grandmother      Social History     Tobacco Use    Smoking status: Every Day     Packs/day: 1.00     Years: 40.00     Pack years: 40.00     Types: Cigarettes    Smokeless tobacco: Never   Substance Use Topics    Alcohol use: No      Review of systems  All systems were reviewed and positive for the above. All other systems noted be negative. Exam  Pulse 76, temperature 97.3 °F (36.3 °C), height 5' 4\" (1.626 m), weight 142 lb 6.4 oz (64.6 kg), SpO2 91 %. On the breast exam there are no new masses. There is no evidence of infection. Impression  Left breast cancer status post left lumpectomy and intraoperative radiation therapy.     Plan  Bilateral mammography in [facility] in July and follow-up exam with me in a week after the mammogram

## 2022-12-29 NOTE — PROGRESS NOTES
MEDICAL ONCOLOGY PROGRESS NOTE    Pt Name: Eliazar Morris  MRN: [id number]  YOB: 1955  Date of evaluation: 1/5/2023    HISTORY OF PRESENT ILLNESS:    The patient has a diagnosis of stage Ia ER positive, TN positive, HER2 negative IDC of the left breast status post left lumpectomy. She had a positive lymph node. She was seen by me and I recommended Oncotype DX. She has been started on adjuvant endocrine therapy with tamoxifen. She has been tolerated treatment well. She has also been scheduled with radiation oncology. Diagnosis  Invasive ductal carcinoma, left breast, Sept 2022  Grade 1  ER 96%, TN 67%, HER-2 2+/equivocal, FISH-negative, Ki67 6%  MammaPrint: Luminal A/low risk  pT1c, pN1a, cM0, stage IA  Evelin 81 gene genetic panel: Negative for pathogenic mutations  Oncotype Dx Recurrence Score 11    Treatment Summary  10/10/22 Initiated endocrine hormone therapy with Tamoxifen 20 mg daily  11/11/22 Left breast lumpectomy with positi Dr. Jessica stringer SLNB (1/2) by Dr Joan Padgett  11/11/22 IORT 2000 cGy left breast  1/13/23 Anticipating Radiation Therapy in [place], [address] [place] [address]. History  Addison Vera was first seen by me on 12/8/2022. She had a screening detected breast lesion. She was referred to Dr. Katty Mabry for further work-up. She is also a longtime smoker and undergoes CT for lung cancer screening. 7/6/22 Bilateral screening mammogram ([address] [place] [address]): Somewhat ill-defined slightly spiculated mass lower inner quadrant left breast.    7/6/22 CT low dose lung cancer screening ([address] [place] [address]): Lung Rad 1 - Negative. Recommendation: Annual LDCT Screening. Incidental findings as described above: Left breast nodule. Recommend diagnostic mammography. Anterior abdominal wall hernia. Correlate clinically. 8/9/22 Left diagnostic mammogram ([address] [place] [address]):  There is a 1.7 x 1.3 cm mass in the lower inner quadrant of the left breast which persists with compression imaging. This has partially obscured, spiculated margins. 8/9/22 US left breast/axilla ([address] [place] [address]): Ultrasound shows a 1.8 x 1.1 x 1.7 cm mass at the o'clock left breast, 4.0 cm from the nipple. This is heterogeneously hypoechoic with posterior shadowing. Margins are microlobulated and slightly spiculated. There is internal blood flow on Doppler imaging. Findings are suspicious and ultrasound-guided biopsy is recommended. Imaging of the axilla shows subcentimeter, physiologic-appearing lymph nodes. 9/26/22 Left breast at 9 o'clock position, core biopsies: Invasive ductal carcinoma, grade 1, adjacent to, and involving, a fibroadenoma (approximately 1.3 cm in greatest linear extent). ER 96%, MI 67%, HER-2 2+/equivocal, FISH-negative, Ki67 6%. MammaPrint: Luminal A/low risk. 10/10/22 Initiated endocrine hormone therapy with Tamoxifen 20 mg daily  10/20/22 Evelin 81 gene genetic panel: Negative for pathogenic mutations  10/25/22 MRI bilateral breast: Right breast: No suspicious MRI finding. Left breast: In the left breast at 9:00 mid depth is demonstration of a irregular noncircumscribed heterogeneously enhancing mass measuring 12 x 16 x 18 mm in length by with fat height. The signal void most consistent with a biopsy clip is noted to be at the very superior extent of the mass. There is no additional suspicious finding. Lymph nodes: No abnormal axillary or internal mammary chain lymph nodes are seen. No worrisome extramammary findings seen. 11/10/22 US left breast: In the left breast 3 cm from the nipple at 9:00 is redemonstration of an irregular noncircumscribed hypoechoic mass. Mass was not measured on current exam. There is demonstration of a finding labeled \"clip. \"  11/11/22 Left breast lumpectomy with positive SLNB (1/2) by Dr Otto Roque  11/11/22 Breast, left lumpectomy:  Infiltrating ductal carcinoma, no special type, grade 1.  Infiltrating carcinoma measures 1.8 cm in greatest linear dimension. Infiltrating carcinoma extends to within 0.1 cm of the nearest caudal surgical excision margin. Focal lymphovascular space invasion is identified. Sections of skin, negative for evidence of malignancy. Breast, excision of additional left breast cranial margin: Benign breast parenchyma. Breast, excision of additional left breast caudal margin: Benign breast parenchyma. Breast, excision of additional left breast lateral margin: Benign breast parenchyma with focal sclerosing adenosis. Breast, excision of additional left breast medial margin: Benign breast parenchyma. Breast, excision of additional left breast deep margin: Benign breast parenchyma. Lymph node, left sentinel lymph node biopsy:  1 out of 2 lymph nodes positive for metastatic adenocarcinoma. Metastatic focus measures 0.4 cm in greatest dimension. Negative for evidence of extracapsular spread. AJCC STAGE:  pT1c, pN1a, pMx.  11/11/22 IORT 2000 cGy left breast  12/8/2022-she was first seen by me. Essentially, node positive, ER/OR positive HER2 negative breast cancer. Stage Ia. Discussed Oncotype Dx regarding adjuvant chemotherapy. Discussed need for radiation. She has been started on letrozole. She is to continue letrozole. Will obtain bone mineral density.       Oncotype Dx Recurrence Score      Past Medical History:    Past Medical History:   Diagnosis Date    Breast lump     CAD (coronary artery disease)     hx of stent; sees [facility] cardiology    Diabetes mellitus ([address]     GERD (gastroesophageal reflux disease)     History of blood transfusion     after c section    HTN (hypertension)     Hyperlipidemia     Malignant neoplasm of overlapping sites of left breast in female, estrogen receptor positive ([address] 10/10/2022    Smoker     Thyroid disease     hx of surgery, no meds       Past Surgical History:    Past Surgical History:   Procedure Laterality Date    BREAST LUMPECTOMY Left 11/11/2022 LEFT LUMPECTOMY & SNB, PREOP & INTRAOP US GUIDED NEEDLE LOC, PEC BLOCK, BIOZORB, FLAPS, MARGINPROBE, & IORT performed by Vito Verdin MD at [place] [address]  12/3/14  MDL    stent to LAD. EF 60%     SECTION      COLONOSCOPY      HERNIA REPAIR      umbilical    THYROID SURGERY      partial    TONSILLECTOMY AND ADENOIDECTOMY      US BREAST NEEDLE BIOPSY LEFT Left 2022    US BREAST NEEDLE BIOPSY LEFT LPS GENERAL SURGERY       Social History:    Marital status:   Smoking status:Currently; 1 pack daily for 40 years  ETOH status:No  Resides: [place], [place]    Family History:   Family History   Problem Relation Age of Onset    Heart Attack Father     Cancer Sister 62        Lung    Breast Cancer Paternal Aunt 36    Cervical Cancer Paternal Aunt 34    Diabetes Maternal Grandmother     Diabetes Paternal Grandmother        Current Hospital Medications:    Current Outpatient Medications   Medication Sig Dispense Refill    tamoxifen (NOLVADEX) 20 MG tablet TAKE 1 TABLET BY MOUTH EVERY DAY      montelukast (SINGULAIR) 10 MG tablet TAKE 1 TABLET BY MOUTH EVERY DAY      oxyCODONE-acetaminophen (PERCOCET)  MG per tablet Take 1 tablet by mouth every 6 hours as needed for Pain. 10 tablet 0    aspirin 81 MG EC tablet Take 81 mg by mouth daily      mupirocin (BACTROBAN) 2 % ointment Apply to each nostril BID x 5 days prior to surgery.  1 each 0    TRULICITY 7.32 DS/7.4BW SOPN once a week      isosorbide mononitrate (IMDUR) 30 MG extended release tablet Take 1 tablet by mouth daily 30 tablet 5    furosemide (LASIX) 40 MG tablet Take 40 mg by mouth daily as needed      glipiZIDE (GLUCOTROL) 10 MG tablet Take 10 mg by mouth 2 times daily (before meals)      nitroGLYCERIN (NITROSTAT) 0.4 MG SL tablet Place 1 tablet under the tongue every 5 minutes as needed for Chest pain 25 tablet 3    metFORMIN (GLUCOPHAGE) 1000 MG tablet Take 1 tablet by mouth 2 times daily (with meals) 60 tablet 3 amitriptyline (ELAVIL) 100 MG tablet Take 100 mg by mouth nightly      oxyCODONE-acetaminophen (PERCOCET)  MG per tablet Take 1 tablet by mouth 3 times daily      metoprolol (LOPRESSOR) 50 MG tablet Take 1 tablet by mouth 2 times daily 60 tablet 0    gabapentin (NEURONTIN) 600 MG tablet Take 1 tablet by mouth 4 times daily. .       No current facility-administered medications for this visit. Allergies: Allergies   Allergen Reactions    Hydrocodone Other (See Comments)     Severe headache, \"I couldn't move\"    Sulfa Antibiotics Itching         Subjective   REVIEW OF SYSTEMS:   CONSTITUTIONAL: no fever, no night sweats, no fatigue;  HEENT: no blurring of vision, no double vision, no hearing difficulty, no tinnitus, no ulceration, no dysplasia, no epistaxis;   LUNGS: no cough, no hemoptysis, no wheeze,  no shortness of breath;  CARDIOVASCULAR: no palpitation, no chest pain, no shortness of breath;  GI: no abdominal pain, no nausea, no vomiting, no diarrhea, no constipation;  JAMILA: no dysuria, no hematuria, no frequency or urgency, no nephrolithiasis;  MUSCULOSKELETAL: no joint pain, no swelling, no stiffness;  ENDOCRINE: no polyuria, no polydipsia, no cold or heat intolerance;  HEMATOLOGY: no easy bruising or bleeding, no history of clotting disorder;  DERMATOLOGY: no skin rash, no eczema, no pruritus;  PSYCHIATRY: no depression, no anxiety, no panic attacks, no suicidal ideation, no homicidal ideation;  NEUROLOGY: no syncope, no seizures, no numbness or tingling of hands, no numbness or tingling of feet, no paresis;     Objective   BP (!) 162/88   Pulse 79   Ht 5' 4\" (1.626 m)   Wt 142 lb 14.4 oz (64.8 kg)   SpO2 95%   BMI 24.53 kg/m²     PHYSICAL EXAM:  CONSTITUTIONAL: Alert, appropriate, no acute distress  EYES: Non icteric, EOM intact, pupils equal round   ENT: Mucus membranes moist, no oral pharyngeal lesions, external inspection of ears and nose are normal  NECK: Supple, no masses.   No palpable thyroid mass  CHEST/LUNGS: CTA bilaterally, normal respiratory effort   CARDIOVASCULAR: RRR, no murmurs. No lower extremity edema  ABDOMEN: soft non-tender, active bowel sounds, no HSM. No palpable masses  EXTREMITIES: warm, full ROM in all 4 extremities, no focal weakness. SKIN: warm, dry with no rashes or lesions  LYMPH: No cervical, clavicular, axillary, or inguinal lymphadenopathy  NEUROLOGIC: follows commands, non focal   PSYCH: mood and affect appropriate. Alert and oriented to time, place, person      LABORATORY RESULTS REVIEWED/ANALYZED BY ME:  Oncotype DX Recurrence Score 11    1/5/23 CBC  WBC 8.4  HGB 14.6  HCT 45.3    Neut 4.22    RADIOLOGY STUDIES REVIEWED BY ME:  None    ASSESSMENT:    No orders of the defined types were placed in this encounter. Diagnoses and all orders for this visit:    Malignant neoplasm of overlapping sites of left breast in female, estrogen receptor positive [facility])    Care plan discussed with patient    Encounter for monitoring tamoxifen therapy    Adverse effect of tamoxifen, subsequent encounter       [id number], stage IA ER+/VA+, Her-2 IHC 2+/FISH negative  -Status post lumpectomy/sentinel lymph node biopsy with Dr. Jovanna Mercado  -Final pathology stage pT1 cN1 M0  -Oncotype DX = 11. Therefore no significant benefit for adjuvant chemotherapy  -Recommend to continue with Femara x5 years  -Recommend adjuvant radiation. She has appoint with Dr. Nicholas Diaz, radiation oncology at [facility] [place].     At risk for lung cancer-the patient undergoes CT lung cancer screening yearly.  -7/6/2022-CT low-dose lung cancer screening, LUNG RAD 1  -She is due for another CT lung cancer screening in July 2022    PLAN:  RTC with MD 3 months  Continue tamoxifen 20 mg p.o. daily  Recommend Lung Screenings annually, July 2023  Proceed with radiation therapy in [facility] [place] for  second week on Jan 2023-SCHED 1/12/23  Recommend Bone Density, anticipated change to aromatase inhibitor- SCHED 01/16/22  Continue follow-up with Dr Nola Grubbs, Sandy Phoenix, am pre charting  as Medical Assistant for Akil Wilson MD. Electronically signed by Sandy Phoenix, MA on 1/5/2023 at 3:56 PM CST. Gerardo Gill, am scribing for Akil Wilson MD. Electronically signed by Junaid Plasencia RN on 1/5/2023 at 3:01 PM CST. I, Dr Marcelino Martinez, personally performed the services described in this documentation as scribed by Junaid Plasencia RN in my presence and is both accurate and complete. I have seen, examined and reviewed this patient medication list, appropriate labs and imaging studies. I reviewed relevant medical records and others physicians notes. I discussed the plans of care with the patient. I answered all the questions to the patients satisfaction. I have also reviewed the chief complaint (CC) and part of the history (History of Present Illness (HPI), Past Family Social History [facility]), or Review of Systems (ROS) and made changes when appropriated. (Please note that portions of this note were completed with a voice recognition program. Efforts were made to edit the dictations but occasionally words are mis-transcribed. )Electronically signed by Akil Wilson MD on 1/5/2023 at 3:46 PM

## 2023-01-05 ENCOUNTER — HOSPITAL ENCOUNTER (OUTPATIENT)
Dept: INFUSION THERAPY | Age: 68
Discharge: HOME | End: 2023-01-05
Payer: MEDICARE

## 2023-01-05 ENCOUNTER — OFFICE VISIT (OUTPATIENT)
Dept: HEMATOLOGY | Age: 68
End: 2023-01-05
Payer: MEDICARE

## 2023-01-05 VITALS
BODY MASS INDEX: 24.4 KG/M2 | DIASTOLIC BLOOD PRESSURE: 88 MMHG | HEIGHT: 64 IN | OXYGEN SATURATION: 95 % | HEART RATE: 79 BPM | WEIGHT: 142.9 LBS | SYSTOLIC BLOOD PRESSURE: 162 MMHG

## 2023-01-05 DIAGNOSIS — C50.812 MALIGNANT NEOPLASM OF OVERLAPPING SITES OF LEFT BREAST IN FEMALE, ESTROGEN RECEPTOR POSITIVE (HCC): Primary | ICD-10-CM

## 2023-01-05 DIAGNOSIS — Z79.810 ENCOUNTER FOR MONITORING TAMOXIFEN THERAPY: ICD-10-CM

## 2023-01-05 DIAGNOSIS — Z51.81 ENCOUNTER FOR MONITORING TAMOXIFEN THERAPY: ICD-10-CM

## 2023-01-05 DIAGNOSIS — Z71.89 CARE PLAN DISCUSSED WITH PATIENT: ICD-10-CM

## 2023-01-05 DIAGNOSIS — Z17.0 MALIGNANT NEOPLASM OF OVERLAPPING SITES OF LEFT BREAST IN FEMALE, ESTROGEN RECEPTOR POSITIVE (HCC): ICD-10-CM

## 2023-01-05 DIAGNOSIS — C50.812 MALIGNANT NEOPLASM OF OVERLAPPING SITES OF LEFT BREAST IN FEMALE, ESTROGEN RECEPTOR POSITIVE (HCC): ICD-10-CM

## 2023-01-05 DIAGNOSIS — Z17.0 MALIGNANT NEOPLASM OF OVERLAPPING SITES OF LEFT BREAST IN FEMALE, ESTROGEN RECEPTOR POSITIVE (HCC): Primary | ICD-10-CM

## 2023-01-05 DIAGNOSIS — T38.6X5D ADVERSE EFFECT OF TAMOXIFEN, SUBSEQUENT ENCOUNTER: ICD-10-CM

## 2023-01-05 LAB
BASOPHILS ABSOLUTE: 0.07 K/UL (ref 0.01–0.08)
BASOPHILS RELATIVE PERCENT: 0.8 % (ref 0.1–1.2)
EOSINOPHILS ABSOLUTE: 0.16 K/UL (ref 0.04–0.54)
EOSINOPHILS RELATIVE PERCENT: 1.9 % (ref 0.7–7)
HCT VFR BLD CALC: 45.3 % (ref 34.1–44.9)
HEMOGLOBIN: 14.6 G/DL (ref 11.2–15.7)
LYMPHOCYTES ABSOLUTE: 3.45 K/UL (ref 1.18–3.74)
LYMPHOCYTES RELATIVE PERCENT: 41.1 % (ref 19.3–53.1)
MCH RBC QN AUTO: 30.7 PG (ref 25.6–32.2)
MCHC RBC AUTO-ENTMCNC: 32.2 G/DL (ref 32.3–35.5)
MCV RBC AUTO: 95.4 FL (ref 79.4–94.8)
MONOCYTES ABSOLUTE: 0.47 K/UL (ref 0.24–0.82)
MONOCYTES RELATIVE PERCENT: 5.6 % (ref 4.7–12.5)
NEUTROPHILS ABSOLUTE: 4.22 K/UL (ref 1.56–6.13)
NEUTROPHILS RELATIVE PERCENT: 50.2 % (ref 34–71.1)
PDW BLD-RTO: 13.6 % (ref 11.7–14.4)
PLATELET # BLD: 224 K/UL (ref 182–369)
PMV BLD AUTO: 11.2 FL (ref 7.4–10.4)
RBC # BLD: 4.75 M/UL (ref 3.93–5.22)
WBC # BLD: 8.4 K/UL (ref 3.98–10.04)

## 2023-01-05 PROCEDURE — 99211 OFF/OP EST MAY X REQ PHY/QHP: CPT

## 2023-01-05 PROCEDURE — 3079F DIAST BP 80-89 MM HG: CPT | Performed by: INTERNAL MEDICINE

## 2023-01-05 PROCEDURE — 3077F SYST BP >= 140 MM HG: CPT | Performed by: INTERNAL MEDICINE

## 2023-01-05 PROCEDURE — 1123F ACP DISCUSS/DSCN MKR DOCD: CPT | Performed by: INTERNAL MEDICINE

## 2023-01-05 PROCEDURE — 36415 COLL VENOUS BLD VENIPUNCTURE: CPT

## 2023-01-05 PROCEDURE — 85025 COMPLETE CBC W/AUTO DIFF WBC: CPT

## 2023-01-05 PROCEDURE — 99213 OFFICE O/P EST LOW 20 MIN: CPT | Performed by: INTERNAL MEDICINE

## 2023-04-03 NOTE — PROGRESS NOTES
Density, January 2026  Continue follow-up with Dr Parris Johnson annual diagnostic mammogram, due August 2023    Clearance dov Lopez pre-charting as a registered nurse for iRchi Sánchez MD. Electronically signed by Eve Gomez RN on 4/5/2023 at 4:25 PM CDT. Clearance dov Lopez scribing for Richi Sánchez MD. Electronically signed by Eve Gomez RN on 4/5/2023 at 1:03 PM CDT. I, Dr Eugene Edmondson, personally performed the services described in this documentation as scribed by Eve Gomez RN in my presence and is both accurate and complete. I have seen, examined and reviewed this patient medication list, appropriate labs and imaging studies. I reviewed relevant medical records and others physicians notes. I discussed the plans of care with the patient. I answered all the questions to the patients satisfaction. I have also reviewed the chief complaint (CC) and part of the history (History of Present Illness (HPI), Past Family Social History [facility]), or Review of Systems (ROS) and made changes when appropriated. (Please note that portions of this note were completed with a voice recognition program. Efforts were made to edit the dictations but occasionally words are mis-transcribed. )Electronically signed by Richi Sánchez MD on 4/5/2023 at 2:58 PM

## 2023-04-05 ENCOUNTER — HOSPITAL ENCOUNTER (OUTPATIENT)
Dept: INFUSION THERAPY | Age: 68
Discharge: HOME | End: 2023-04-05
Payer: MEDICARE

## 2023-04-05 ENCOUNTER — OFFICE VISIT (OUTPATIENT)
Dept: HEMATOLOGY | Age: 68
End: 2023-04-05
Payer: MEDICARE

## 2023-04-05 VITALS
TEMPERATURE: 98.1 F | BODY MASS INDEX: 24.39 KG/M2 | OXYGEN SATURATION: 94 % | DIASTOLIC BLOOD PRESSURE: 70 MMHG | SYSTOLIC BLOOD PRESSURE: 130 MMHG | WEIGHT: 142.1 LBS | HEART RATE: 81 BPM

## 2023-04-05 DIAGNOSIS — C50.912 BREAST CANCER METASTASIZED TO AXILLARY LYMPH NODE, LEFT (HCC): ICD-10-CM

## 2023-04-05 DIAGNOSIS — C50.812 MALIGNANT NEOPLASM OF OVERLAPPING SITES OF LEFT BREAST IN FEMALE, ESTROGEN RECEPTOR POSITIVE (HCC): ICD-10-CM

## 2023-04-05 DIAGNOSIS — M85.80 OSTEOPENIA, UNSPECIFIED LOCATION: ICD-10-CM

## 2023-04-05 DIAGNOSIS — Z17.0 MALIGNANT NEOPLASM OF OVERLAPPING SITES OF LEFT BREAST IN FEMALE, ESTROGEN RECEPTOR POSITIVE (HCC): ICD-10-CM

## 2023-04-05 DIAGNOSIS — C50.812 MALIGNANT NEOPLASM OF OVERLAPPING SITES OF LEFT BREAST IN FEMALE, ESTROGEN RECEPTOR POSITIVE (HCC): Primary | ICD-10-CM

## 2023-04-05 DIAGNOSIS — C77.3 BREAST CANCER METASTASIZED TO AXILLARY LYMPH NODE, LEFT (HCC): ICD-10-CM

## 2023-04-05 DIAGNOSIS — Z17.0 MALIGNANT NEOPLASM OF OVERLAPPING SITES OF LEFT BREAST IN FEMALE, ESTROGEN RECEPTOR POSITIVE (HCC): Primary | ICD-10-CM

## 2023-04-05 DIAGNOSIS — Z87.891 HISTORY OF SMOKING 30 OR MORE PACK YEARS: ICD-10-CM

## 2023-04-05 LAB
BASOPHILS # BLD: 0.08 K/UL (ref 0.01–0.08)
BASOPHILS NFR BLD: 0.9 % (ref 0.1–1.2)
EOSINOPHIL # BLD: 0.11 K/UL (ref 0.04–0.54)
EOSINOPHIL NFR BLD: 1.2 % (ref 0.7–7)
ERYTHROCYTE [DISTWIDTH] IN BLOOD BY AUTOMATED COUNT: 13.7 % (ref 11.7–14.4)
HCT VFR BLD AUTO: 46 % (ref 34.1–44.9)
HGB BLD-MCNC: 14.9 G/DL (ref 11.2–15.7)
LYMPHOCYTES # BLD: 2.79 K/UL (ref 1.18–3.74)
LYMPHOCYTES NFR BLD: 31.4 % (ref 19.3–53.1)
MCH RBC QN AUTO: 31.3 PG (ref 25.6–32.2)
MCHC RBC AUTO-ENTMCNC: 32.4 G/DL (ref 32.3–35.5)
MCV RBC AUTO: 96.6 FL (ref 79.4–94.8)
MONOCYTES # BLD: 0.5 K/UL (ref 0.24–0.82)
MONOCYTES NFR BLD: 5.6 % (ref 4.7–12.5)
NEUTROPHILS # BLD: 5.38 K/UL (ref 1.56–6.13)
NEUTS SEG NFR BLD: 60.7 % (ref 34–71.1)
PLATELET # BLD AUTO: 235 K/UL (ref 182–369)
PMV BLD AUTO: 11.1 FL (ref 7.4–10.4)
RBC # BLD AUTO: 4.76 M/UL (ref 3.93–5.22)
WBC # BLD AUTO: 8.88 K/UL (ref 3.98–10.04)

## 2023-04-05 PROCEDURE — 36415 COLL VENOUS BLD VENIPUNCTURE: CPT

## 2023-04-05 PROCEDURE — 3078F DIAST BP <80 MM HG: CPT | Performed by: INTERNAL MEDICINE

## 2023-04-05 PROCEDURE — 99213 OFFICE O/P EST LOW 20 MIN: CPT | Performed by: INTERNAL MEDICINE

## 2023-04-05 PROCEDURE — 3075F SYST BP GE 130 - 139MM HG: CPT | Performed by: INTERNAL MEDICINE

## 2023-04-05 PROCEDURE — 85025 COMPLETE CBC W/AUTO DIFF WBC: CPT

## 2023-04-05 PROCEDURE — 99212 OFFICE O/P EST SF 10 MIN: CPT

## 2023-04-05 PROCEDURE — 1123F ACP DISCUSS/DSCN MKR DOCD: CPT | Performed by: INTERNAL MEDICINE

## 2023-04-05 RX ORDER — TAMOXIFEN CITRATE 20 MG/1
20 TABLET ORAL DAILY
Qty: 30 TABLET | Refills: 12 | Status: SHIPPED | OUTPATIENT
Start: 2023-04-05

## 2023-07-19 ENCOUNTER — TELEMEDICINE (OUTPATIENT)
Dept: HEMATOLOGY | Age: 68
End: 2023-07-19
Payer: MEDICARE

## 2023-07-19 DIAGNOSIS — C77.3 BREAST CANCER METASTASIZED TO AXILLARY LYMPH NODE, LEFT (HCC): Primary | ICD-10-CM

## 2023-07-19 DIAGNOSIS — Z71.89 CARE PLAN DISCUSSED WITH PATIENT: ICD-10-CM

## 2023-07-19 DIAGNOSIS — Z17.0 MALIGNANT NEOPLASM OF OVERLAPPING SITES OF LEFT BREAST IN FEMALE, ESTROGEN RECEPTOR POSITIVE (HCC): ICD-10-CM

## 2023-07-19 DIAGNOSIS — C50.912 BREAST CANCER METASTASIZED TO AXILLARY LYMPH NODE, LEFT (HCC): Primary | ICD-10-CM

## 2023-07-19 DIAGNOSIS — Z87.891 HISTORY OF SMOKING 30 OR MORE PACK YEARS: ICD-10-CM

## 2023-07-19 DIAGNOSIS — C50.812 MALIGNANT NEOPLASM OF OVERLAPPING SITES OF LEFT BREAST IN FEMALE, ESTROGEN RECEPTOR POSITIVE (HCC): ICD-10-CM

## 2023-07-19 PROCEDURE — 99213 OFFICE O/P EST LOW 20 MIN: CPT | Performed by: INTERNAL MEDICINE

## 2023-12-06 DIAGNOSIS — C77.3 BREAST CANCER METASTASIZED TO AXILLARY LYMPH NODE, LEFT (HCC): Primary | ICD-10-CM

## 2023-12-06 DIAGNOSIS — C50.912 BREAST CANCER METASTASIZED TO AXILLARY LYMPH NODE, LEFT (HCC): Primary | ICD-10-CM

## 2023-12-06 NOTE — PROGRESS NOTES
[facility]IGITAL [facility]AGNOSTIC W OR WO CAD BILATERAL     Standing Status:   Future     Standing Expiration Date:   2/7/2025     Scheduling Instructions:      Sched Next available [facility]     Order Specific Question:   Reason for exam:     Answer:   Jordin Kaey was seen today for follow-up. Diagnoses and all orders for this visit:    Breast cancer metastasized to axillary lymph node, left (HCC)  -     [facility] DIGITAL DIAGNOSTIC W OR WO CAD BILATERAL; Future    Care plan discussed with patient    Lung nodule    Lung nodule seen on imaging study  -     CT CHEST W CONTRAST; Future    Adverse effect of tamoxifen, subsequent encounter    Right upper lobe pulmonary nodule  -     CT CHEST W CONTRAST; Future    Osteopenia, unspecified location       [id number], stage IA ER+/AR+, Her-2 IHC 2+/FISH negative  -Status post lumpectomy/sentinel lymph node biopsy with Dr. Ruthie Lucio  -Final pathology stage pT1 cN1 M0  -Oncotype DX = 11. Therefore no significant benefit for adjuvant chemotherapy  -Continue tamoxifen x10 years through September 2022  -Status post completion of adjuvant radiation therapy left breast total dose 4272 cGy 16 fractions   -Tolerating treatments complains of hot flashes. Longtime smoker-at least 3 minutes discussion about the harmful effects of cigarettes, especially on her bone density. Osteopenia-T score -1.7, January 2023  -Recommend to continue calcium/vitamin D3  -Recommend to cut down smoking/quitting    At risk for lung cancer-the patient undergoes CT lung cancer screening yearly. 7/10/23 CT LDCT Lung Screening: Postoperative changes involving the left breast with surgical clips left axillary region. Patient is also status post resection of the right thyroid lobe. No axillary, hilar, mediastinal, or retrocrural adenopathy. Thoracic aorta is normal in caliber. Main pulmonary artery normal in size. Heart is normal in size. Moderate-severe coronary calcifications.

## 2023-12-07 ENCOUNTER — HOSPITAL ENCOUNTER (OUTPATIENT)
Dept: INFUSION THERAPY | Age: 68
Discharge: HOME | End: 2023-12-07
Payer: MEDICARE

## 2023-12-07 ENCOUNTER — OFFICE VISIT (OUTPATIENT)
Dept: HEMATOLOGY | Age: 68
End: 2023-12-07
Payer: MEDICARE

## 2023-12-07 ENCOUNTER — TELEPHONE (OUTPATIENT)
Dept: HEMATOLOGY | Age: 68
End: 2023-12-07

## 2023-12-07 VITALS
BODY MASS INDEX: 22.82 KG/M2 | HEIGHT: 65 IN | TEMPERATURE: 98 F | HEART RATE: 64 BPM | OXYGEN SATURATION: 95 % | SYSTOLIC BLOOD PRESSURE: 150 MMHG | DIASTOLIC BLOOD PRESSURE: 100 MMHG | WEIGHT: 137 LBS

## 2023-12-07 DIAGNOSIS — R91.1 LUNG NODULE SEEN ON IMAGING STUDY: ICD-10-CM

## 2023-12-07 DIAGNOSIS — R91.1 LUNG NODULE: ICD-10-CM

## 2023-12-07 DIAGNOSIS — T38.6X5D ADVERSE EFFECT OF TAMOXIFEN, SUBSEQUENT ENCOUNTER: ICD-10-CM

## 2023-12-07 DIAGNOSIS — R91.1 RIGHT UPPER LOBE PULMONARY NODULE: ICD-10-CM

## 2023-12-07 DIAGNOSIS — C77.3 BREAST CANCER METASTASIZED TO AXILLARY LYMPH NODE, LEFT (HCC): ICD-10-CM

## 2023-12-07 DIAGNOSIS — Z17.0 MALIGNANT NEOPLASM OF OVERLAPPING SITES OF LEFT BREAST IN FEMALE, ESTROGEN RECEPTOR POSITIVE (HCC): ICD-10-CM

## 2023-12-07 DIAGNOSIS — C50.912 BREAST CANCER METASTASIZED TO AXILLARY LYMPH NODE, LEFT (HCC): Primary | ICD-10-CM

## 2023-12-07 DIAGNOSIS — C50.912 BREAST CANCER METASTASIZED TO AXILLARY LYMPH NODE, LEFT (HCC): ICD-10-CM

## 2023-12-07 DIAGNOSIS — C77.3 BREAST CANCER METASTASIZED TO AXILLARY LYMPH NODE, LEFT (HCC): Primary | ICD-10-CM

## 2023-12-07 DIAGNOSIS — Z71.89 CARE PLAN DISCUSSED WITH PATIENT: ICD-10-CM

## 2023-12-07 DIAGNOSIS — C50.812 MALIGNANT NEOPLASM OF OVERLAPPING SITES OF LEFT BREAST IN FEMALE, ESTROGEN RECEPTOR POSITIVE (HCC): ICD-10-CM

## 2023-12-07 DIAGNOSIS — M85.80 OSTEOPENIA, UNSPECIFIED LOCATION: ICD-10-CM

## 2023-12-07 LAB
ALBUMIN SERPL-MCNC: 3.7 G/DL (ref 3.5–5.2)
ALP SERPL-CCNC: 129 U/L (ref 35–104)
ALT SERPL-CCNC: 19 U/L (ref 9–52)
ANION GAP SERPL CALCULATED.3IONS-SCNC: 8 MMOL/L (ref 7–19)
AST SERPL-CCNC: 25 U/L (ref 14–36)
BASOPHILS # BLD: 0.08 K/UL (ref 0.01–0.08)
BASOPHILS NFR BLD: 0.9 % (ref 0.1–1.2)
BILIRUB SERPL-MCNC: 0.3 MG/DL (ref 0.2–1.3)
BUN SERPL-MCNC: 12 MG/DL (ref 7–17)
CALCIUM SERPL-MCNC: 8.6 MG/DL (ref 8.4–10.2)
CHLORIDE SERPL-SCNC: 103 MMOL/L (ref 98–111)
CO2 SERPL-SCNC: 26 MMOL/L (ref 22–29)
CREAT SERPL-MCNC: 0.7 MG/DL (ref 0.5–1)
EOSINOPHIL # BLD: 0.11 K/UL (ref 0.04–0.54)
EOSINOPHIL NFR BLD: 1.2 % (ref 0.7–7)
ERYTHROCYTE [DISTWIDTH] IN BLOOD BY AUTOMATED COUNT: 14.2 % (ref 11.7–14.4)
GLOBULIN: 3.4 G/DL
GLUCOSE SERPL-MCNC: 174 MG/DL (ref 74–106)
HCT VFR BLD AUTO: 42.6 % (ref 34.1–44.9)
HGB BLD-MCNC: 13.8 G/DL (ref 11.2–15.7)
LYMPHOCYTES # BLD: 2.82 K/UL (ref 1.18–3.74)
LYMPHOCYTES NFR BLD: 31.8 % (ref 19.3–53.1)
MCH RBC QN AUTO: 29.1 PG (ref 25.6–32.2)
MCHC RBC AUTO-ENTMCNC: 32.4 G/DL (ref 32.3–35.5)
MCV RBC AUTO: 89.7 FL (ref 79.4–94.8)
MONOCYTES # BLD: 0.41 K/UL (ref 0.24–0.82)
MONOCYTES NFR BLD: 4.6 % (ref 4.7–12.5)
NEUTROPHILS # BLD: 5.43 K/UL (ref 1.56–6.13)
NEUTS SEG NFR BLD: 61.2 % (ref 34–71.1)
PLATELET # BLD AUTO: 260 K/UL (ref 182–369)
PMV BLD AUTO: 10.9 FL (ref 7.4–10.4)
POTASSIUM SERPL-SCNC: 4.4 MMOL/L (ref 3.5–5.1)
PROT SERPL-MCNC: 7.1 G/DL (ref 6.3–8.2)
RBC # BLD AUTO: 4.75 M/UL (ref 3.93–5.22)
SODIUM SERPL-SCNC: 137 MMOL/L (ref 137–145)
WBC # BLD AUTO: 8.88 K/UL (ref 3.98–10.04)

## 2023-12-07 PROCEDURE — 99213 OFFICE O/P EST LOW 20 MIN: CPT | Performed by: INTERNAL MEDICINE

## 2023-12-07 PROCEDURE — 36415 COLL VENOUS BLD VENIPUNCTURE: CPT

## 2023-12-07 PROCEDURE — 3080F DIAST BP >= 90 MM HG: CPT | Performed by: INTERNAL MEDICINE

## 2023-12-07 PROCEDURE — 85025 COMPLETE CBC W/AUTO DIFF WBC: CPT

## 2023-12-07 PROCEDURE — 80053 COMPREHEN METABOLIC PANEL: CPT

## 2023-12-07 PROCEDURE — 99212 OFFICE O/P EST SF 10 MIN: CPT

## 2023-12-07 PROCEDURE — 1123F ACP DISCUSS/DSCN MKR DOCD: CPT | Performed by: INTERNAL MEDICINE

## 2023-12-07 PROCEDURE — 3077F SYST BP >= 140 MM HG: CPT | Performed by: INTERNAL MEDICINE

## 2023-12-07 NOTE — TELEPHONE ENCOUNTER
LVM with the following appointments for Mammogram & CT Chest at [place], [place]. Mammogram - 01/18/2024 @ 12:45pm    CT Chest - 04/01/2024 @ 10:00am  *NPO that morning    If pt calls back please inform of appt information. Thanks!

## 2024-04-09 ENCOUNTER — TELEPHONE (OUTPATIENT)
Dept: HEMATOLOGY | Age: 69
End: 2024-04-09

## 2024-04-09 NOTE — TELEPHONE ENCOUNTER
Patient states that she will call [facility] tomorrow on 04/10/24 and aks if she can talk to his nurses about a few things about the appt on 04/11/24. I will send his nurses a message to see if they can reach out to her.

## 2024-04-11 ENCOUNTER — APPOINTMENT (OUTPATIENT)
Dept: GENERAL RADIOLOGY | Age: 69
DRG: 322 | End: 2024-04-11
Payer: MEDICARE

## 2024-04-11 ENCOUNTER — HOSPITAL ENCOUNTER (INPATIENT)
Age: 69
LOS: 2 days | Discharge: HOME | DRG: 322 | End: 2024-04-13
Attending: HOSPITALIST
Payer: MEDICARE

## 2024-04-11 ENCOUNTER — TELEPHONE (OUTPATIENT)
Dept: HEMATOLOGY | Age: 69
End: 2024-04-11

## 2024-04-11 DIAGNOSIS — I21.4 NSTEMI (NON-ST ELEVATED MYOCARDIAL INFARCTION) (HCC): Primary | ICD-10-CM

## 2024-04-11 DIAGNOSIS — I25.10 CAD (CORONARY ARTERY DISEASE): ICD-10-CM

## 2024-04-11 DIAGNOSIS — C50.912 BREAST CANCER METASTASIZED TO AXILLARY LYMPH NODE, LEFT (HCC): ICD-10-CM

## 2024-04-11 DIAGNOSIS — C77.3 BREAST CANCER METASTASIZED TO AXILLARY LYMPH NODE, LEFT (HCC): ICD-10-CM

## 2024-04-11 DIAGNOSIS — I10 HTN (HYPERTENSION): ICD-10-CM

## 2024-04-11 DIAGNOSIS — R91.1 NODULE OF UPPER LOBE OF RIGHT LUNG: Primary | ICD-10-CM

## 2024-04-11 LAB
ALBUMIN SERPL-MCNC: 4.2 G/DL (ref 3.5–5.2)
ALP SERPL-CCNC: 128 U/L (ref 35–104)
ALT SERPL-CCNC: 14 U/L (ref 5–33)
ANION GAP SERPL CALCULATED.3IONS-SCNC: 16 MMOL/L (ref 7–19)
ANTI-XA UNFRAC HEPARIN: 0.85 IU/ML (ref 0.3–0.7)
APTT PPP: 151.9 SEC (ref 26–36.2)
AST SERPL-CCNC: 47 U/L (ref 5–32)
BASOPHILS # BLD: 0.1 K/UL (ref 0–0.2)
BASOPHILS NFR BLD: 0.6 % (ref 0–1)
BILIRUB SERPL-MCNC: 0.3 MG/DL (ref 0.2–1.2)
BNP BLD-MCNC: 1599 PG/ML (ref 0–124)
BUN SERPL-MCNC: 10 MG/DL (ref 8–23)
CALCIUM SERPL-MCNC: 9.5 MG/DL (ref 8.8–10.2)
CHLORIDE SERPL-SCNC: 105 MMOL/L (ref 98–111)
CHOLEST SERPL-MCNC: 192 MG/DL (ref 160–199)
CO2 SERPL-SCNC: 21 MMOL/L (ref 22–29)
CREAT SERPL-MCNC: 0.7 MG/DL (ref 0.5–0.9)
D DIMER PPP FEU-MCNC: <0.27 UG/ML FEU (ref 0–0.48)
EOSINOPHIL # BLD: 0 K/UL (ref 0–0.6)
EOSINOPHIL NFR BLD: 0.4 % (ref 0–5)
ERYTHROCYTE [DISTWIDTH] IN BLOOD BY AUTOMATED COUNT: 14.5 % (ref 11.5–14.5)
GLUCOSE BLD-MCNC: 231 MG/DL (ref 70–99)
GLUCOSE SERPL-MCNC: 161 MG/DL (ref 74–109)
HBA1C MFR BLD: 7.7 % (ref 4–6)
HCT VFR BLD AUTO: 45.1 % (ref 37–47)
HDLC SERPL-MCNC: 38 MG/DL (ref 65–121)
HGB BLD-MCNC: 14.6 G/DL (ref 12–16)
IMM GRANULOCYTES # BLD: 0 K/UL
INR PPP: 1.22 (ref 0.88–1.18)
LDLC SERPL CALC-MCNC: ABNORMAL MG/DL
LDLC SERPL-MCNC: 96 MG/DL
LYMPHOCYTES # BLD: 2.4 K/UL (ref 1.1–4.5)
LYMPHOCYTES NFR BLD: 21.8 % (ref 20–40)
MAGNESIUM SERPL-MCNC: 2.2 MG/DL (ref 1.6–2.4)
MCH RBC QN AUTO: 28.8 PG (ref 27–31)
MCHC RBC AUTO-ENTMCNC: 32.4 G/DL (ref 33–37)
MCV RBC AUTO: 89 FL (ref 81–99)
MONOCYTES # BLD: 0.5 K/UL (ref 0–0.9)
MONOCYTES NFR BLD: 4.6 % (ref 0–10)
NEUTROPHILS # BLD: 7.9 K/UL (ref 1.5–7.5)
NEUTS SEG NFR BLD: 72.2 % (ref 50–65)
PERFORMED ON: ABNORMAL
PHOSPHATE SERPL-MCNC: 3 MG/DL (ref 2.5–4.5)
PLATELET # BLD AUTO: 235 K/UL (ref 130–400)
PMV BLD AUTO: 12 FL (ref 9.4–12.3)
POTASSIUM SERPL-SCNC: 3.8 MMOL/L (ref 3.5–5)
PROT SERPL-MCNC: 7.3 G/DL (ref 6.6–8.7)
PROTHROMBIN TIME: 15 SEC (ref 12–14.6)
RBC # BLD AUTO: 5.07 M/UL (ref 4.2–5.4)
SODIUM SERPL-SCNC: 142 MMOL/L (ref 136–145)
TRIGL SERPL-MCNC: 405 MG/DL (ref 0–149)
TROPONIN, HIGH SENSITIVITY: 603 NG/L (ref 0–14)
TROPONIN, HIGH SENSITIVITY: 826 NG/L (ref 0–14)
TSH SERPL DL<=0.005 MIU/L-ACNC: 0.9 UIU/ML (ref 0.27–4.2)
WBC # BLD AUTO: 10.9 K/UL (ref 4.8–10.8)

## 2024-04-11 PROCEDURE — 99285 EMERGENCY DEPT VISIT HI MDM: CPT

## 2024-04-11 PROCEDURE — 6360000002 HC RX W HCPCS: Performed by: HOSPITALIST

## 2024-04-11 PROCEDURE — 36415 COLL VENOUS BLD VENIPUNCTURE: CPT

## 2024-04-11 PROCEDURE — 83036 HEMOGLOBIN GLYCOSYLATED A1C: CPT

## 2024-04-11 PROCEDURE — 85610 PROTHROMBIN TIME: CPT

## 2024-04-11 PROCEDURE — 96374 THER/PROPH/DIAG INJ IV PUSH: CPT

## 2024-04-11 PROCEDURE — 6370000000 HC RX 637 (ALT 250 FOR IP): Performed by: HOSPITALIST

## 2024-04-11 PROCEDURE — 85025 COMPLETE CBC W/AUTO DIFF WBC: CPT

## 2024-04-11 PROCEDURE — 85520 HEPARIN ASSAY: CPT

## 2024-04-11 PROCEDURE — 82962 GLUCOSE BLOOD TEST: CPT

## 2024-04-11 PROCEDURE — 71045 X-RAY EXAM CHEST 1 VIEW: CPT

## 2024-04-11 PROCEDURE — 83735 ASSAY OF MAGNESIUM: CPT

## 2024-04-11 PROCEDURE — 2140000000 HC CCU INTERMEDIATE R&B

## 2024-04-11 PROCEDURE — 83721 ASSAY OF BLOOD LIPOPROTEIN: CPT

## 2024-04-11 PROCEDURE — 84484 ASSAY OF TROPONIN QUANT: CPT

## 2024-04-11 PROCEDURE — 6370000000 HC RX 637 (ALT 250 FOR IP): Performed by: NURSE PRACTITIONER

## 2024-04-11 PROCEDURE — 85730 THROMBOPLASTIN TIME PARTIAL: CPT

## 2024-04-11 PROCEDURE — 80053 COMPREHEN METABOLIC PANEL: CPT

## 2024-04-11 PROCEDURE — 83880 ASSAY OF NATRIURETIC PEPTIDE: CPT

## 2024-04-11 PROCEDURE — 85379 FIBRIN DEGRADATION QUANT: CPT

## 2024-04-11 PROCEDURE — 84100 ASSAY OF PHOSPHORUS: CPT

## 2024-04-11 PROCEDURE — 84443 ASSAY THYROID STIM HORMONE: CPT

## 2024-04-11 PROCEDURE — 80061 LIPID PANEL: CPT

## 2024-04-11 PROCEDURE — 2580000003 HC RX 258: Performed by: HOSPITALIST

## 2024-04-11 RX ORDER — GABAPENTIN 600 MG/1
600 TABLET ORAL 4 TIMES DAILY
Status: DISCONTINUED | OUTPATIENT
Start: 2024-04-11 | End: 2024-04-13 | Stop reason: HOSPADM

## 2024-04-11 RX ORDER — SODIUM CHLORIDE 0.9 % (FLUSH) 0.9 %
5-40 SYRINGE (ML) INJECTION PRN
Status: DISCONTINUED | OUTPATIENT
Start: 2024-04-11 | End: 2024-04-13 | Stop reason: HOSPADM

## 2024-04-11 RX ORDER — ONDANSETRON 2 MG/ML
4 INJECTION INTRAMUSCULAR; INTRAVENOUS EVERY 6 HOURS PRN
Status: DISCONTINUED | OUTPATIENT
Start: 2024-04-11 | End: 2024-04-13 | Stop reason: HOSPADM

## 2024-04-11 RX ORDER — ASPIRIN 81 MG/1
81 TABLET, CHEWABLE ORAL DAILY
Status: CANCELLED | OUTPATIENT
Start: 2024-04-12

## 2024-04-11 RX ORDER — METOPROLOL TARTRATE 50 MG/1
50 TABLET, FILM COATED ORAL 2 TIMES DAILY
Status: DISCONTINUED | OUTPATIENT
Start: 2024-04-11 | End: 2024-04-13 | Stop reason: HOSPADM

## 2024-04-11 RX ORDER — INSULIN GLARGINE 100 [IU]/ML
10 INJECTION, SOLUTION SUBCUTANEOUS NIGHTLY
Status: DISCONTINUED | OUTPATIENT
Start: 2024-04-11 | End: 2024-04-13 | Stop reason: HOSPADM

## 2024-04-11 RX ORDER — NALOXONE HYDROCHLORIDE 0.4 MG/ML
0.4 INJECTION, SOLUTION INTRAMUSCULAR; INTRAVENOUS; SUBCUTANEOUS PRN
Status: DISCONTINUED | OUTPATIENT
Start: 2024-04-11 | End: 2024-04-13 | Stop reason: HOSPADM

## 2024-04-11 RX ORDER — HEPARIN SODIUM 1000 [USP'U]/ML
30 INJECTION, SOLUTION INTRAVENOUS; SUBCUTANEOUS PRN
Status: DISCONTINUED | OUTPATIENT
Start: 2024-04-11 | End: 2024-04-12 | Stop reason: SDUPTHER

## 2024-04-11 RX ORDER — OXYCODONE AND ACETAMINOPHEN 10; 325 MG/1; MG/1
1 TABLET ORAL EVERY 8 HOURS PRN
Status: DISCONTINUED | OUTPATIENT
Start: 2024-04-11 | End: 2024-04-13 | Stop reason: HOSPADM

## 2024-04-11 RX ORDER — ONDANSETRON 4 MG/1
4 TABLET, ORALLY DISINTEGRATING ORAL EVERY 8 HOURS PRN
Status: DISCONTINUED | OUTPATIENT
Start: 2024-04-11 | End: 2024-04-13 | Stop reason: HOSPADM

## 2024-04-11 RX ORDER — SODIUM CHLORIDE 0.9 % (FLUSH) 0.9 %
5-40 SYRINGE (ML) INJECTION EVERY 12 HOURS SCHEDULED
Status: DISCONTINUED | OUTPATIENT
Start: 2024-04-11 | End: 2024-04-13 | Stop reason: HOSPADM

## 2024-04-11 RX ORDER — INSULIN LISPRO 100 [IU]/ML
0-4 INJECTION, SOLUTION INTRAVENOUS; SUBCUTANEOUS NIGHTLY
Status: DISCONTINUED | OUTPATIENT
Start: 2024-04-11 | End: 2024-04-13 | Stop reason: HOSPADM

## 2024-04-11 RX ORDER — ASPIRIN 81 MG/1
81 TABLET ORAL DAILY
Status: DISCONTINUED | OUTPATIENT
Start: 2024-04-12 | End: 2024-04-13 | Stop reason: HOSPADM

## 2024-04-11 RX ORDER — MAGNESIUM SULFATE IN WATER 40 MG/ML
2000 INJECTION, SOLUTION INTRAVENOUS PRN
Status: DISCONTINUED | OUTPATIENT
Start: 2024-04-11 | End: 2024-04-13 | Stop reason: HOSPADM

## 2024-04-11 RX ORDER — NITROGLYCERIN 0.4 MG/1
0.4 TABLET SUBLINGUAL EVERY 5 MIN PRN
Status: DISCONTINUED | OUTPATIENT
Start: 2024-04-11 | End: 2024-04-11

## 2024-04-11 RX ORDER — ACETAMINOPHEN 325 MG/1
650 TABLET ORAL EVERY 4 HOURS PRN
Status: DISCONTINUED | OUTPATIENT
Start: 2024-04-11 | End: 2024-04-13 | Stop reason: HOSPADM

## 2024-04-11 RX ORDER — CALCIUM CARBONATE 500 MG/1
500 TABLET, CHEWABLE ORAL 3 TIMES DAILY PRN
Status: DISCONTINUED | OUTPATIENT
Start: 2024-04-11 | End: 2024-04-13 | Stop reason: HOSPADM

## 2024-04-11 RX ORDER — POTASSIUM CHLORIDE 7.45 MG/ML
10 INJECTION INTRAVENOUS PRN
Status: DISCONTINUED | OUTPATIENT
Start: 2024-04-11 | End: 2024-04-13 | Stop reason: HOSPADM

## 2024-04-11 RX ORDER — HEPARIN SODIUM 1000 [USP'U]/ML
60 INJECTION, SOLUTION INTRAVENOUS; SUBCUTANEOUS ONCE
Status: COMPLETED | OUTPATIENT
Start: 2024-04-11 | End: 2024-04-11

## 2024-04-11 RX ORDER — HEPARIN SODIUM 1000 [USP'U]/ML
60 INJECTION, SOLUTION INTRAVENOUS; SUBCUTANEOUS PRN
Status: DISCONTINUED | OUTPATIENT
Start: 2024-04-11 | End: 2024-04-12

## 2024-04-11 RX ORDER — TAMOXIFEN CITRATE 10 MG/1
20 TABLET ORAL DAILY
Status: DISCONTINUED | OUTPATIENT
Start: 2024-04-12 | End: 2024-04-13 | Stop reason: HOSPADM

## 2024-04-11 RX ORDER — PREGABALIN 75 MG/1
75 CAPSULE ORAL 4 TIMES DAILY
COMMUNITY

## 2024-04-11 RX ORDER — ATORVASTATIN CALCIUM 80 MG/1
80 TABLET, FILM COATED ORAL NIGHTLY
Status: DISCONTINUED | OUTPATIENT
Start: 2024-04-11 | End: 2024-04-13 | Stop reason: HOSPADM

## 2024-04-11 RX ORDER — DEXTROSE MONOHYDRATE 100 MG/ML
INJECTION, SOLUTION INTRAVENOUS CONTINUOUS PRN
Status: DISCONTINUED | OUTPATIENT
Start: 2024-04-11 | End: 2024-04-13 | Stop reason: HOSPADM

## 2024-04-11 RX ORDER — MECOBALAMIN 5000 MCG
5 TABLET,DISINTEGRATING ORAL NIGHTLY PRN
Status: DISCONTINUED | OUTPATIENT
Start: 2024-04-11 | End: 2024-04-13 | Stop reason: HOSPADM

## 2024-04-11 RX ORDER — HYDRALAZINE HYDROCHLORIDE 20 MG/ML
10 INJECTION INTRAMUSCULAR; INTRAVENOUS EVERY 4 HOURS PRN
Status: DISCONTINUED | OUTPATIENT
Start: 2024-04-11 | End: 2024-04-13 | Stop reason: HOSPADM

## 2024-04-11 RX ORDER — POTASSIUM CHLORIDE 20 MEQ/1
40 TABLET, EXTENDED RELEASE ORAL PRN
Status: DISCONTINUED | OUTPATIENT
Start: 2024-04-11 | End: 2024-04-13 | Stop reason: HOSPADM

## 2024-04-11 RX ORDER — NITROGLYCERIN 0.4 MG/1
0.4 TABLET SUBLINGUAL EVERY 5 MIN PRN
Status: DISCONTINUED | OUTPATIENT
Start: 2024-04-11 | End: 2024-04-13 | Stop reason: HOSPADM

## 2024-04-11 RX ORDER — LABETALOL HYDROCHLORIDE 5 MG/ML
10 INJECTION, SOLUTION INTRAVENOUS EVERY 4 HOURS PRN
Status: DISCONTINUED | OUTPATIENT
Start: 2024-04-11 | End: 2024-04-13 | Stop reason: HOSPADM

## 2024-04-11 RX ORDER — HEPARIN SODIUM 10000 [USP'U]/100ML
5-30 INJECTION, SOLUTION INTRAVENOUS CONTINUOUS
Status: DISCONTINUED | OUTPATIENT
Start: 2024-04-11 | End: 2024-04-12

## 2024-04-11 RX ORDER — INSULIN LISPRO 100 [IU]/ML
0-4 INJECTION, SOLUTION INTRAVENOUS; SUBCUTANEOUS
Status: DISCONTINUED | OUTPATIENT
Start: 2024-04-12 | End: 2024-04-13 | Stop reason: HOSPADM

## 2024-04-11 RX ORDER — ACETAMINOPHEN 500 MG
1000 TABLET ORAL ONCE
Status: COMPLETED | OUTPATIENT
Start: 2024-04-11 | End: 2024-04-11

## 2024-04-11 RX ORDER — POLYETHYLENE GLYCOL 3350 17 G/17G
17 POWDER, FOR SOLUTION ORAL DAILY PRN
Status: DISCONTINUED | OUTPATIENT
Start: 2024-04-11 | End: 2024-04-13 | Stop reason: HOSPADM

## 2024-04-11 RX ORDER — ACETAMINOPHEN 650 MG/1
650 SUPPOSITORY RECTAL EVERY 4 HOURS PRN
Status: DISCONTINUED | OUTPATIENT
Start: 2024-04-11 | End: 2024-04-13 | Stop reason: HOSPADM

## 2024-04-11 RX ORDER — SODIUM CHLORIDE 9 MG/ML
INJECTION, SOLUTION INTRAVENOUS PRN
Status: DISCONTINUED | OUTPATIENT
Start: 2024-04-11 | End: 2024-04-13 | Stop reason: HOSPADM

## 2024-04-11 RX ADMIN — OXYCODONE HYDROCHLORIDE AND ACETAMINOPHEN 1 TABLET: 10; 325 TABLET ORAL at 22:34

## 2024-04-11 RX ADMIN — GABAPENTIN 600 MG: 600 TABLET, FILM COATED ORAL at 22:27

## 2024-04-11 RX ADMIN — HEPARIN SODIUM 12 UNITS/KG/HR: 10000 INJECTION, SOLUTION INTRAVENOUS at 20:25

## 2024-04-11 RX ADMIN — PREGABALIN 75 MG: 25 CAPSULE ORAL at 22:27

## 2024-04-11 RX ADMIN — HEPARIN SODIUM 3790 UNITS: 1000 INJECTION INTRAVENOUS; SUBCUTANEOUS at 20:24

## 2024-04-11 RX ADMIN — ACETAMINOPHEN 1000 MG: 500 TABLET ORAL at 19:22

## 2024-04-11 RX ADMIN — INSULIN GLARGINE 10 UNITS: 100 INJECTION, SOLUTION SUBCUTANEOUS at 22:29

## 2024-04-11 RX ADMIN — METOPROLOL TARTRATE 50 MG: 50 TABLET, FILM COATED ORAL at 22:28

## 2024-04-11 RX ADMIN — SODIUM CHLORIDE, PRESERVATIVE FREE 10 ML: 5 INJECTION INTRAVENOUS at 22:28

## 2024-04-11 RX ADMIN — ATORVASTATIN CALCIUM 80 MG: 80 TABLET, FILM COATED ORAL at 22:28

## 2024-04-11 ASSESSMENT — PAIN DESCRIPTION - DESCRIPTORS
DESCRIPTORS: ACHING
DESCRIPTORS: ACHING;DISCOMFORT;NAGGING

## 2024-04-11 ASSESSMENT — ENCOUNTER SYMPTOMS
COLOR CHANGE: 0
COUGH: 0
GASTROINTESTINAL NEGATIVE: 1
SHORTNESS OF BREATH: 1
WHEEZING: 0
CHEST TIGHTNESS: 0

## 2024-04-11 ASSESSMENT — PAIN DESCRIPTION - ORIENTATION: ORIENTATION: RIGHT;LEFT

## 2024-04-11 ASSESSMENT — PAIN SCALES - GENERAL
PAINLEVEL_OUTOF10: 6
PAINLEVEL_OUTOF10: 8

## 2024-04-11 ASSESSMENT — PAIN DESCRIPTION - ONSET: ONSET: ON-GOING

## 2024-04-11 ASSESSMENT — PAIN DESCRIPTION - LOCATION
LOCATION: FOOT
LOCATION: HEAD

## 2024-04-11 ASSESSMENT — PAIN DESCRIPTION - FREQUENCY: FREQUENCY: CONTINUOUS

## 2024-04-11 ASSESSMENT — PAIN DESCRIPTION - PAIN TYPE: TYPE: CHRONIC PAIN

## 2024-04-11 ASSESSMENT — PAIN - FUNCTIONAL ASSESSMENT: PAIN_FUNCTIONAL_ASSESSMENT: PREVENTS OR INTERFERES SOME ACTIVE ACTIVITIES AND ADLS

## 2024-04-11 ASSESSMENT — HEART SCORE: ECG: NON-SPECIFC REPOLARIZATION DISTURBANCE/LBTB/PM

## 2024-04-11 NOTE — ED PROVIDER NOTES
[facility] EMERGENCY DEPT  EMERGENCY DEPARTMENT ENCOUNTER      Pt Name: Kerry Kruger  MRN: [id number]  Birthdate 1955  Date of evaluation: 4/11/2024  Provider: GENO Osorio NP    CHIEF COMPLAINT       Chief Complaint   Patient presents with    Chest Pain     Diagnosed with NSTEMI last night and left AMA          HISTORY OF PRESENT ILLNESS   (Location/Symptom, Timing/Onset,Context/Setting, Quality, Duration, Modifying Factors, Severity)  Note limiting factors.   Kerry Kruger is a 69 y.o. female who presents to the emergency department with complaint of shortness of breath.  She reports that she was evaluated at [facility] ER last evening but left AMA because she had to care for her dogs.  She denies any chest pain today but reports progressive shortness of breath over the last 3 weeks.  She denies any upper respiratory illness, fever, chills, NVD.  She reports that her blood pressure has been elevated as well.        The history is provided by the patient.       NursingNotes were reviewed.    REVIEW OF SYSTEMS    (2-9 systems for level 4, 10 or more for level 5)     Review of Systems   Constitutional:  Positive for fatigue. Negative for appetite change, diaphoresis and fever.   Respiratory:  Positive for shortness of breath. Negative for cough, chest tightness and wheezing.    Cardiovascular:  Negative for chest pain, palpitations and leg swelling.   Gastrointestinal: Negative.    Musculoskeletal: Negative.    Skin:  Negative for color change.   Neurological:  Positive for headaches.   Psychiatric/Behavioral: Negative.     All other systems reviewed and are negative.      A complete review of systems was performed and is negative except as noted above in the HPI.       PAST MEDICAL HISTORY     Past Medical History:   Diagnosis Date    Breast lump     CAD (coronary artery disease)     hx of stent; sees [facility] cardiology    Diabetes mellitus (HCC)     GERD (gastroesophageal reflux disease)      History of blood transfusion     after c section    HTN (hypertension)     Hyperlipidemia     Malignant neoplasm of overlapping sites of left breast in female, estrogen receptor positive (HCC) 10/10/2022    Smoker     Thyroid disease     hx of surgery, no meds         SURGICAL HISTORY       Past Surgical History:   Procedure Laterality Date    BREAST LUMPECTOMY Left 2022    LEFT LUMPECTOMY & SNB, PREOP & INTRAOP US GUIDED NEEDLE LOC, PEC BLOCK, BIOZORB, FLAPS, MARGINPROBE, & IORT performed by Tashi Simms MD at [facility] OR    CARDIAC CATHETERIZATION  12/3/14  [facility]    stent to LAD. EF 60%     SECTION      COLONOSCOPY      HERNIA REPAIR      umbilical    THYROID SURGERY      partial    TONSILLECTOMY AND ADENOIDECTOMY      US BREAST BIOPSY W LOC DEVICE 1ST LESION LEFT Left 2022    US BREAST NEEDLE BIOPSY LEFT LPS GENERAL SURGERY         CURRENT MEDICATIONS       Previous Medications    ASPIRIN 81 MG EC TABLET    Take 1 tablet by mouth daily    GABAPENTIN (NEURONTIN) 600 MG TABLET    Take 1 tablet by mouth 4 times daily.    METFORMIN (GLUCOPHAGE) 1000 MG TABLET    Take 1 tablet by mouth 2 times daily (with meals)    METOPROLOL (LOPRESSOR) 50 MG TABLET    Take 1 tablet by mouth 2 times daily    MUPIROCIN (BACTROBAN) 2 % OINTMENT    Apply to each nostril BID x 5 days prior to surgery.    NITROGLYCERIN (NITROSTAT) 0.4 MG SL TABLET    Place 1 tablet under the tongue every 5 minutes as needed for Chest pain    OXYCODONE-ACETAMINOPHEN (PERCOCET)  MG PER TABLET    Take 1 tablet by mouth 3 times daily.    PREGABALIN (LYRICA) 75 MG CAPSULE    1 capsule in the morning, at noon, in the evening, and at bedtime.    TAMOXIFEN (NOLVADEX) 20 MG TABLET    Take 1 tablet by mouth daily    TRULICITY 0.75 MG/0.5ML SOPN    once a week       ALLERGIES     Hydrocodone and Sulfa antibiotics    FAMILY HISTORY       Family History   Problem Relation Age of Onset    Heart Attack Father     Cancer Sister 58        Lung     Breast Cancer Paternal Aunt 40    Cervical Cancer Paternal Aunt 29    Diabetes Maternal Grandmother     Diabetes Paternal Grandmother           SOCIAL HISTORY       Social History     Socioeconomic History    Marital status: Legally      Spouse name: None    Number of children: None    Years of education: None    Highest education level: None   Tobacco Use    Smoking status: Every Day     Current packs/day: 1.00     Average packs/day: 1 pack/day for 40.0 years (40.0 ttl pk-yrs)     Types: Cigarettes    Smokeless tobacco: Never   Vaping Use    Vaping Use: Never used   Substance and Sexual Activity    Alcohol use: No    Drug use: No       SCREENINGS    Isabella Coma Scale  Eye Opening: Spontaneous  Best Verbal Response: Oriented  Best Motor Response: Obeys commands  Isabella Coma Scale Score: 15 Heart Score for chest pain patients  History: Highly Suspicious  ECG: Non-Specifc repolarization disturbance/LBTB/PM  Patient Age: > 65 years  *Risk factors for Atherosclerotic disease: Cigarette smoking, Hypertension, Positive family History  Risk Factors: 1 or 2 risk factors  Troponin: > 3X normal limit  Heart Score Total: 8      PHYSICAL EXAM    (up to 7 for level 4, 8 or more for level 5)     ED Triage Vitals [04/11/24 1632]   BP Temp Temp src Pulse Respirations SpO2 Height Weight - Scale   (!) 182/90 98.3 °F (36.8 °C) -- 81 18 96 % -- 63 kg (139 lb)       Physical Exam  Vitals and nursing note reviewed.   Constitutional:       General: She is not in acute distress.     Appearance: She is normal weight. She is not ill-appearing.   HENT:      Nose: Nose normal.      Mouth/Throat:      Mouth: Mucous membranes are moist.   Eyes:      Extraocular Movements: Extraocular movements intact.      Pupils: Pupils are equal, round, and reactive to light.   Cardiovascular:      Rate and Rhythm: Normal rate and regular rhythm.   Pulmonary:      Effort: Pulmonary effort is normal.      Breath sounds: Normal breath sounds.  with left axis deviation and ST segment depression in multiple leads.  Troponin today is 826.  proBNP 1599.  Hospitalist notified for admission Dr. Purvis notified of admission.  Heparin drip ordered per Dr. Marlo Martinez as well as repeat troponin and EKG.  Patient was agreeable to admission and continued treatment.       Amount and/or Complexity of Data Reviewed  Clinical lab tests: ordered and reviewed  Tests in the radiology section of CPT®: ordered and reviewed        ED Course as of 04/11/24 2013   Thu Apr 11, 2024 2002 Dr. Blanchard notified of admission; requested heparin drip, ASA, which has been ordered.  [TG]      ED Course User Index  [TG] Mele Hoffmann APRN - NP       CONSULTS:  IP CONSULT TO CARDIOLOGY    PROCEDURES:  Unless otherwise notedbelow, none     Procedures      FINAL IMPRESSION     1. NSTEMI (non-ST elevated myocardial infarction) (HCC)          DISPOSITION/PLAN   DISPOSITION Decision To Admit 04/11/2024 05:39:46 PM      No notes of EC Admission Criteria type on file.    PATIENT REFERRED TO:  No follow-up provider specified.    DISCHARGE MEDICATIONS:  New Prescriptions    No medications on file          (Please note that portions of this note were completed with a voice recognition program.  Efforts were made to edit the dictations butoccasionally words are mis-transcribed.)    GENO Osorio NP (electronically signed)  AttendingEmergency Physician         Mele Hoffmann APRN - NP  04/11/24 2013       Mele Hoffmann APRN - NP  04/11/24 [id number]

## 2024-04-11 NOTE — TELEPHONE ENCOUNTER
Called pt to inform of the following appt for CT Chest in [facility]:    July 11, 2024 arrive at 9:30am  *NPO 4 hours prior    Faxing referral to Dr. Villarreal they will call pt to schedule an appointment AFTER she has the scan per Dr. Moses-- verified this information with patient and she voiced understanding of all the above information.

## 2024-04-12 ENCOUNTER — APPOINTMENT (OUTPATIENT)
Dept: CARDIAC CATH/INVASIVE PROCEDURES | Age: 69
DRG: 322 | End: 2024-04-12
Payer: MEDICARE

## 2024-04-12 LAB
ANION GAP SERPL CALCULATED.3IONS-SCNC: 13 MMOL/L (ref 7–19)
ANTI-XA UNFRAC HEPARIN: <0.1 IU/ML (ref 0.3–0.7)
BASOPHILS # BLD: 0.1 K/UL (ref 0–0.2)
BASOPHILS NFR BLD: 0.8 % (ref 0–1)
BUN SERPL-MCNC: 11 MG/DL (ref 8–23)
CALCIUM SERPL-MCNC: 8.9 MG/DL (ref 8.8–10.2)
CHLORIDE SERPL-SCNC: 105 MMOL/L (ref 98–111)
CO2 SERPL-SCNC: 24 MMOL/L (ref 22–29)
CREAT SERPL-MCNC: 0.7 MG/DL (ref 0.5–0.9)
EOSINOPHIL # BLD: 0.1 K/UL (ref 0–0.6)
EOSINOPHIL NFR BLD: 1.3 % (ref 0–5)
ERYTHROCYTE [DISTWIDTH] IN BLOOD BY AUTOMATED COUNT: 14.5 % (ref 11.5–14.5)
GLUCOSE BLD-MCNC: 145 MG/DL (ref 70–99)
GLUCOSE BLD-MCNC: 184 MG/DL (ref 70–99)
GLUCOSE BLD-MCNC: 207 MG/DL (ref 70–99)
GLUCOSE BLD-MCNC: 276 MG/DL (ref 70–99)
GLUCOSE SERPL-MCNC: 129 MG/DL (ref 74–109)
HCT VFR BLD AUTO: 44 % (ref 37–47)
HGB BLD-MCNC: 13.9 G/DL (ref 12–16)
IMM GRANULOCYTES # BLD: 0 K/UL
LYMPHOCYTES # BLD: 3.2 K/UL (ref 1.1–4.5)
LYMPHOCYTES NFR BLD: 42.5 % (ref 20–40)
MCH RBC QN AUTO: 28.2 PG (ref 27–31)
MCHC RBC AUTO-ENTMCNC: 31.6 G/DL (ref 33–37)
MCV RBC AUTO: 89.2 FL (ref 81–99)
MONOCYTES # BLD: 0.5 K/UL (ref 0–0.9)
MONOCYTES NFR BLD: 6 % (ref 0–10)
NEUTROPHILS # BLD: 3.7 K/UL (ref 1.5–7.5)
NEUTS SEG NFR BLD: 49.1 % (ref 50–65)
PERFORMED ON: ABNORMAL
PLATELET # BLD AUTO: 213 K/UL (ref 130–400)
PMV BLD AUTO: 11.6 FL (ref 9.4–12.3)
POTASSIUM SERPL-SCNC: 3.6 MMOL/L (ref 3.5–5)
RBC # BLD AUTO: 4.93 M/UL (ref 4.2–5.4)
SODIUM SERPL-SCNC: 142 MMOL/L (ref 136–145)
WBC # BLD AUTO: 7.5 K/UL (ref 4.8–10.8)

## 2024-04-12 PROCEDURE — 4A023N7 MEASUREMENT OF CARDIAC SAMPLING AND PRESSURE, LEFT HEART, PERCUTANEOUS APPROACH: ICD-10-PCS | Performed by: INTERNAL MEDICINE

## 2024-04-12 PROCEDURE — C1887 CATHETER, GUIDING: HCPCS

## 2024-04-12 PROCEDURE — C8929 TTE W OR WO FOL WCON,DOPPLER: HCPCS

## 2024-04-12 PROCEDURE — B2111ZZ FLUOROSCOPY OF MULTIPLE CORONARY ARTERIES USING LOW OSMOLAR CONTRAST: ICD-10-PCS | Performed by: INTERNAL MEDICINE

## 2024-04-12 PROCEDURE — 93458 L HRT ARTERY/VENTRICLE ANGIO: CPT | Performed by: INTERNAL MEDICINE

## 2024-04-12 PROCEDURE — 99152 MOD SED SAME PHYS/QHP 5/>YRS: CPT

## 2024-04-12 PROCEDURE — C1874 STENT, COATED/COV W/DEL SYS: HCPCS

## 2024-04-12 PROCEDURE — 6360000002 HC RX W HCPCS: Performed by: INTERNAL MEDICINE

## 2024-04-12 PROCEDURE — 36415 COLL VENOUS BLD VENIPUNCTURE: CPT

## 2024-04-12 PROCEDURE — 99222 1ST HOSP IP/OBS MODERATE 55: CPT | Performed by: INTERNAL MEDICINE

## 2024-04-12 PROCEDURE — 93005 ELECTROCARDIOGRAM TRACING: CPT | Performed by: EMERGENCY MEDICINE

## 2024-04-12 PROCEDURE — C1725 CATH, TRANSLUMIN NON-LASER: HCPCS

## 2024-04-12 PROCEDURE — C1769 GUIDE WIRE: HCPCS

## 2024-04-12 PROCEDURE — 99152 MOD SED SAME PHYS/QHP 5/>YRS: CPT | Performed by: INTERNAL MEDICINE

## 2024-04-12 PROCEDURE — 6370000000 HC RX 637 (ALT 250 FOR IP): Performed by: INTERNAL MEDICINE

## 2024-04-12 PROCEDURE — 92921 PR PRQ TRLUML CORONARY ANGIOPLASTY ADDL BRANCH: CPT | Performed by: INTERNAL MEDICINE

## 2024-04-12 PROCEDURE — 6370000000 HC RX 637 (ALT 250 FOR IP)

## 2024-04-12 PROCEDURE — 85025 COMPLETE CBC W/AUTO DIFF WBC: CPT

## 2024-04-12 PROCEDURE — 85520 HEPARIN ASSAY: CPT

## 2024-04-12 PROCEDURE — 94760 N-INVAS EAR/PLS OXIMETRY 1: CPT

## 2024-04-12 PROCEDURE — 2709999900 HC NON-CHARGEABLE SUPPLY

## 2024-04-12 PROCEDURE — C1760 CLOSURE DEV, VASC: HCPCS

## 2024-04-12 PROCEDURE — 92928 PRQ TCAT PLMT NTRAC ST 1 LES: CPT | Performed by: INTERNAL MEDICINE

## 2024-04-12 PROCEDURE — B2151ZZ FLUOROSCOPY OF LEFT HEART USING LOW OSMOLAR CONTRAST: ICD-10-PCS | Performed by: INTERNAL MEDICINE

## 2024-04-12 PROCEDURE — 92928 PRQ TCAT PLMT NTRAC ST 1 LES: CPT

## 2024-04-12 PROCEDURE — 82962 GLUCOSE BLOOD TEST: CPT

## 2024-04-12 PROCEDURE — 6360000002 HC RX W HCPCS: Performed by: HOSPITALIST

## 2024-04-12 PROCEDURE — 2580000003 HC RX 258: Performed by: HOSPITALIST

## 2024-04-12 PROCEDURE — 6360000002 HC RX W HCPCS

## 2024-04-12 PROCEDURE — 2580000003 HC RX 258: Performed by: INTERNAL MEDICINE

## 2024-04-12 PROCEDURE — 80048 BASIC METABOLIC PNL TOTAL CA: CPT

## 2024-04-12 PROCEDURE — 92920 PRQ TRLUML C ANGIOP 1ART&/BR: CPT

## 2024-04-12 PROCEDURE — 6360000004 HC RX CONTRAST MEDICATION: Performed by: INTERNAL MEDICINE

## 2024-04-12 PROCEDURE — 93458 L HRT ARTERY/VENTRICLE ANGIO: CPT

## 2024-04-12 PROCEDURE — 027035Z DILATION OF CORONARY ARTERY, ONE ARTERY WITH TWO DRUG-ELUTING INTRALUMINAL DEVICES, PERCUTANEOUS APPROACH: ICD-10-PCS | Performed by: INTERNAL MEDICINE

## 2024-04-12 PROCEDURE — 6370000000 HC RX 637 (ALT 250 FOR IP): Performed by: HOSPITALIST

## 2024-04-12 PROCEDURE — 2140000000 HC CCU INTERMEDIATE R&B

## 2024-04-12 PROCEDURE — C1894 INTRO/SHEATH, NON-LASER: HCPCS

## 2024-04-12 PROCEDURE — 99153 MOD SED SAME PHYS/QHP EA: CPT

## 2024-04-12 PROCEDURE — 6360000004 HC RX CONTRAST MEDICATION: Performed by: HOSPITALIST

## 2024-04-12 RX ORDER — HEPARIN SODIUM 5000 [USP'U]/ML
5000 INJECTION, SOLUTION INTRAVENOUS; SUBCUTANEOUS EVERY 8 HOURS SCHEDULED
Status: DISCONTINUED | OUTPATIENT
Start: 2024-04-12 | End: 2024-04-13 | Stop reason: HOSPADM

## 2024-04-12 RX ORDER — CLOPIDOGREL BISULFATE 75 MG/1
75 TABLET ORAL DAILY
Status: DISCONTINUED | OUTPATIENT
Start: 2024-04-13 | End: 2024-04-13 | Stop reason: HOSPADM

## 2024-04-12 RX ORDER — SODIUM CHLORIDE 9 MG/ML
INJECTION, SOLUTION INTRAVENOUS CONTINUOUS
Status: DISCONTINUED | OUTPATIENT
Start: 2024-04-12 | End: 2024-04-13 | Stop reason: HOSPADM

## 2024-04-12 RX ORDER — VALSARTAN 80 MG/1
80 TABLET ORAL 2 TIMES DAILY
Status: DISCONTINUED | OUTPATIENT
Start: 2024-04-12 | End: 2024-04-13 | Stop reason: HOSPADM

## 2024-04-12 RX ADMIN — SODIUM CHLORIDE: 9 INJECTION, SOLUTION INTRAVENOUS at 13:39

## 2024-04-12 RX ADMIN — HEPARIN SODIUM 3790 UNITS: 1000 INJECTION INTRAVENOUS; SUBCUTANEOUS at 06:48

## 2024-04-12 RX ADMIN — HEPARIN SODIUM 5000 UNITS: 5000 INJECTION INTRAVENOUS; SUBCUTANEOUS at 21:49

## 2024-04-12 RX ADMIN — METOPROLOL TARTRATE 50 MG: 50 TABLET, FILM COATED ORAL at 09:33

## 2024-04-12 RX ADMIN — ASPIRIN 81 MG: 81 TABLET, COATED ORAL at 09:33

## 2024-04-12 RX ADMIN — VALSARTAN 80 MG: 80 TABLET ORAL at 13:35

## 2024-04-12 RX ADMIN — VALSARTAN 80 MG: 80 TABLET ORAL at 21:35

## 2024-04-12 RX ADMIN — SODIUM CHLORIDE: 9 INJECTION, SOLUTION INTRAVENOUS at 21:50

## 2024-04-12 RX ADMIN — OXYCODONE HYDROCHLORIDE AND ACETAMINOPHEN 1 TABLET: 10; 325 TABLET ORAL at 09:35

## 2024-04-12 RX ADMIN — TAMOXIFEN CITRATE 20 MG: 10 TABLET ORAL at 09:33

## 2024-04-12 RX ADMIN — SODIUM CHLORIDE, PRESERVATIVE FREE 10 ML: 5 INJECTION INTRAVENOUS at 09:33

## 2024-04-12 RX ADMIN — OXYCODONE HYDROCHLORIDE AND ACETAMINOPHEN 1 TABLET: 10; 325 TABLET ORAL at 17:14

## 2024-04-12 RX ADMIN — GABAPENTIN 600 MG: 600 TABLET, FILM COATED ORAL at 09:33

## 2024-04-12 RX ADMIN — PREGABALIN 75 MG: 25 CAPSULE ORAL at 21:30

## 2024-04-12 RX ADMIN — METOPROLOL TARTRATE 50 MG: 50 TABLET, FILM COATED ORAL at 21:35

## 2024-04-12 RX ADMIN — PERFLUTREN 1.5 ML: 6.52 INJECTION, SUSPENSION INTRAVENOUS at 08:14

## 2024-04-12 RX ADMIN — PREGABALIN 75 MG: 25 CAPSULE ORAL at 16:19

## 2024-04-12 RX ADMIN — IOPAMIDOL 200 ML: 612 INJECTION, SOLUTION INTRAVENOUS at 13:10

## 2024-04-12 RX ADMIN — SODIUM CHLORIDE, PRESERVATIVE FREE 10 ML: 5 INJECTION INTRAVENOUS at 21:41

## 2024-04-12 RX ADMIN — ATORVASTATIN CALCIUM 80 MG: 80 TABLET, FILM COATED ORAL at 21:35

## 2024-04-12 RX ADMIN — PREGABALIN 75 MG: 25 CAPSULE ORAL at 06:11

## 2024-04-12 RX ADMIN — OXYCODONE HYDROCHLORIDE AND ACETAMINOPHEN 1 TABLET: 10; 325 TABLET ORAL at 23:53

## 2024-04-12 RX ADMIN — INSULIN LISPRO 2 UNITS: 100 INJECTION, SOLUTION INTRAVENOUS; SUBCUTANEOUS at 17:16

## 2024-04-12 RX ADMIN — INSULIN GLARGINE 10 UNITS: 100 INJECTION, SOLUTION SUBCUTANEOUS at 21:30

## 2024-04-12 ASSESSMENT — PAIN DESCRIPTION - DESCRIPTORS: DESCRIPTORS: ACHING;BURNING

## 2024-04-12 ASSESSMENT — ENCOUNTER SYMPTOMS
CONSTIPATION: 0
CHEST TIGHTNESS: 1
COUGH: 0
DIARRHEA: 0
WHEEZING: 0
BACK PAIN: 0
SHORTNESS OF BREATH: 1
EYE DISCHARGE: 0
BLOOD IN STOOL: 0
ABDOMINAL DISTENTION: 0
VOMITING: 0

## 2024-04-12 ASSESSMENT — PAIN - FUNCTIONAL ASSESSMENT: PAIN_FUNCTIONAL_ASSESSMENT: ACTIVITIES ARE NOT PREVENTED

## 2024-04-12 ASSESSMENT — PAIN DESCRIPTION - LOCATION
LOCATION: GENERALIZED;FOOT
LOCATION: FOOT

## 2024-04-12 ASSESSMENT — PAIN DESCRIPTION - ORIENTATION: ORIENTATION: RIGHT;LEFT

## 2024-04-12 ASSESSMENT — PAIN SCALES - GENERAL
PAINLEVEL_OUTOF10: 6
PAINLEVEL_OUTOF10: 7

## 2024-04-12 NOTE — H&P
[facility]      Hospitalist - History & Physical      PCP: Roxanne Reynoso, APRN - CNP    Date of Admission: 4/11/2024    Date of Service: 4/11/2024    Chief Complaint:  Shortness of breath     History Of Present Illness:   The patient is a 69 y.o. female who presented to [facility] ED for evaluation of shortness of breath. Pt has history of breast cancer followed by Dr. Moses, CAD with prior cardiac stent placement, hypertension, cigarette smoking, hyperlipidemia and diabetes.     Pt reports problems with unusual fatigue over past week having developed chest pressure last night associated with shortness of breath  an pain to her upper back/shoulder. [facility] however decided to leave facility AMA. She relates that today she continues with problems with shortness of breath. She denies current chest pain as well as history of pe/dvt.     In ED, EKG without ischemic changes, troponin 826, case discussed with Dr. Blanchard by ED provider. Ddimer 0.27, sodium 142, creatinine 0.7/bun 10, glucose 161, cxr-No acute cardiopulmonary disease. Wbc 10.9, hgb 14.6, platelets 235k. Pt is admitted inpatient to hospitalist.    Past Medical History:        Diagnosis Date    Breast lump     CAD (coronary artery disease)     hx of stent; sees [facility] cardiology    Diabetes mellitus (HCC)     GERD (gastroesophageal reflux disease)     History of blood transfusion     after c section    HTN (hypertension)     Hyperlipidemia     Malignant neoplasm of overlapping sites of left breast in female, estrogen receptor positive (HCC) 10/10/2022    Smoker     Thyroid disease     hx of surgery, no meds       Past Surgical History:        Procedure Laterality Date    BREAST LUMPECTOMY Left 11/11/2022    LEFT LUMPECTOMY & SNB, PREOP & INTRAOP US GUIDED NEEDLE LOC, PEC BLOCK, BIOZORB, FLAPS, MARGINPROBE, & IORT performed by Tashi Simms MD at [facility] OR    CARDIAC CATHETERIZATION  12/3/14  [facility]     surgical clips in the left breast.  The lungs are hyperinflated. The cardiomediastinal silhouette is normal.  The pulmonary vasculature is normal.  No consolidating infiltrates are detected.  No pneumothoraces or pleural effusions.       1. No acute cardiopulmonary disease. 2. Suggestion of emphysema. 3. Postsurgical changes.    .   ______________________________________ Electronically signed by: OMARI RIVAS D.O. Date:     04/11/2024 Time:    17:00     X-Ray Chest Portable    Result Date: 4/11/2024 4/11/2024 12:17 AM CDT XR CHEST PORTABLE History:  69 years  Female Chest Pain REFERENCE EXAM: none FINDINGS: Single AP view of the chest submitted for review.  Cardiac/pericardial silhouette is unremarkable. No consolidation, pleural effusion or pneumothorax is seen.    IMPRESSION: No acute cardiopulmonary findings.      Assessment/Plan:  Principal Problem:    NSTEMI (non-ST elevated myocardial infarction) ([facility])  Resolved Problems:    * No resolved hospital problems. *     Active Problems:    NSTEMI  -telemetry  -trend serial troponin  -follow ekg  -echocardiogram  -cardiac meds-asa/statin/ntg sl prn  -cards consult   -heparin infusion  -follow ptt   -lipid panel   -I's and O's   -daily weight   -occult blood stool x1   -bedrest   -I's and O's   -daily wegiht   -pt eval/treat   -npo after midnight   -tsh with reflex FT4   -magnesium   -phosphorus   -bnp    Diabetes   -HgA1c  -poc glucose qid  -low dose insulin coverage  -hypoglycemia orders  Resolved Problems:    * No resolved hospital problems. *  Signed:  GENO Elliott - CNP, 4/11/2024 8:27 PM

## 2024-04-12 NOTE — CARE COORDINATION
Second IMM given to patient and explained with patient verbalizing understanding.  All questions and concerns addressed     Signed letter placed in pt soft chart  Patient declined waiting 4 hr period prior to discharge.  Electronically signed by ADRIANA GAN on 4/12/2024 at 9:47 AM     04/12/24 0946   IMM Letter   IMM Letter given to Patient/Family/Significant other/Guardian/POA/by: Leslie Hopkins   IMM Letter date given: 04/12/24   IMM Letter time given: 0925

## 2024-04-12 NOTE — PLAN OF CARE
SUBJECTIVE:    Found at [facility] ED to have NSTEMI after she presented for Fatigue there. She then decided to drive to her home to make sure her dog's needs were met and then came to our ED for help.       OBJECTIVE:    BP (!) 157/61   Pulse 99   Temp 98.3 °F (36.8 °C)   Resp 17   Wt 63 kg (139 lb)   SpO2 97%   BMI 23.49 kg/m²       ASSESSMENTS & PLANS:    NSTEMI:  Tele  Admit to cardiac edmond as inpatient status patient  Cardio consulted already by ED NP  NPO except sips with meds from [place]   Trend Tn  Mag, Phos, TSH with reflex T4, Lipid Panel, HbA1c - will run as add ons to ED labs if able  CBC and BMP with Reflex for following AM  K goal of WNL and >= 4.0  Mag goal of WNL and >= 2.0  ASA as per protocol (324-325mg chewed STAT unless on 81ASA daily in which case 162mg chewed STAT if not yet given, THEN from following AM 81mg Daily.)  Statin as per protocol (High intensity Statin, if already on high intensity Stain of Simvasttain 80 continue it, if Lipitor then Lipitor 80, if Rosuvastatin  then Rosuvastatin 40mg PO QHS (unless significant renal dysfunction, then lipitor)  NG SL PRN CP  TTE in AM  EKG already done, EKG PRN, and EKG paired with timed Troponins  Cardiac Provocative and Invasive Testing will be deferred to Cardiology  Heparin GGT    Chronic Medical Problems:  Continue home regimen as indicated    Supportive and Prophylactic Txx:  DVT PPx: Heparin GGT  GI (PUD) PPx: not indicated  PT: contraindicated as per ACS      Case d/w ED NP in detail  Chart reviewed   Orders entered by me with CPOE  Case to be d/w Hospitalist NP

## 2024-04-12 NOTE — PROCEDURES
Cardiac catheterization/PCI preliminary note:    Double vessel disease with LAD mid 70% calcific stenosis with culprit distal RCA 99% diffuse stenosis with DAVIN I flow.  Smaller vessel that gives rise to an RPDA only that is forming collaterals from LAD.  Circumflex with nonobstructive disease.  Normal LV systolic function with mild inferobasal hypokinesis.  Unsuccessful attempt at intervention to distal RCA with formation of intramural hematoma post PTCA (2.0 mm balloon) with wire access loss to RPDA and inability to reaccess true lumen.  Successful PCI to mid LAD with MAGGY x 2.  Patient without significant symptoms with RCA intervention.  Will reevaluate for repeat attempt at RCA once subintimal hematoma has subsided, possibly in 2 weeks versus medical management.  Noted infarct in this territory.    Plan: Continue medical management.

## 2024-04-12 NOTE — PROGRESS NOTES
Date:2024  Patient: Kerry Kruger  : 1955  MRN:[id number]  CODE:                                                                        PCP:Roxanne Reynoso, APRN - [place]    Admit Date: 2024  4:41 PM   LOS: 1 day     Hospital course : The patient is a 69 y.o. female who presented to [facility] ED for evaluation of shortness of breath.     Pt has history of breast cancer followed by Dr. Moses, CAD with prior cardiac stent placement, hypertension, cigarette smoking, hyperlipidemia and diabetes.      Pt reports problems with unusual fatigue over past week having developed chest pressure last night associated with shortness of breath, and radiation of pain to her upper back/shoulder. [facility] however decided to leave facility AMA. She relates that today she continues with problems with shortness of breath. She denies current chest pain as well as history of pe/dvt.      In ED, EKG without ischemic changes, troponin 826, case discussed with Dr. Blanchard by ED provider.  Imaging reviewed.  Pt is admitted inpatient to hospitalist with cardiology consultation for possible intervention.       Subjective:  seen and evaluated after she came back from cardiac cath got 1 stent in the LAD, no acute complaint discussed about following up with comorbid condition and cardiology for discharge plan    Review of Systems    Reviewed 12 system and found most of them negative except as stated above in subjective note    Objective:      Vital signs in last 24 hours:  Patient Vitals for the past 24 hrs:   BP Temp Temp src Pulse Resp SpO2 Weight   24 1450 (!) 157/75 (!) 96.1 °F (35.6 °C) -- 66 16 91 % --   24 1415 (!) 161/75 -- -- 63 16 94 % --   24 1400 (!) 155/78 -- -- 60 16 95 % --   24 1345 (!) 146/82 -- -- 56 17 96 % --   24 1327 (!) 150/76 97.5 °F (36.4 °C) Temporal 58 16 95 % --   24 1142 -- -- -- -- -- 95 % --   24 1055 139/68  97.9 °F (36.6 °C) Temporal 70 16 95 % --   04/12/24 0724 (!) 156/69 97.5 °F (36.4 °C) Temporal 72 16 94 % --   04/12/24 0334 (!) 149/85 97.3 °F (36.3 °C) Temporal 66 16 100 % 59.5 kg (131 lb 2 oz)   04/11/24 2345 (!) 155/96 97.5 °F (36.4 °C) Temporal 67 17 96 % --   04/11/24 2234 (!) 154/72 -- -- -- 17 -- --   04/11/24 2132 (!) 192/86 97.9 °F (36.6 °C) Temporal 75 16 97 % --   04/11/24 2013 (!) 157/61 -- -- 99 17 97 % --   04/11/24 1931 (!) 171/84 -- -- 71 18 96 % --   04/11/24 1914 (!) 159/92 -- -- 71 18 95 % --   04/11/24 1632 (!) 182/90 98.3 °F (36.8 °C) -- 81 18 96 % 63 kg (139 lb)       Patient examined with appropriate PPE  Physical Exam:  Vital Signs: BP (!) 157/75   Pulse 66   Temp (!) 96.1 °F (35.6 °C)   Resp 16   Wt 59.5 kg (131 lb 2 oz)   SpO2 91%   BMI 22.16 kg/m²   General appearance:.Lying comfortably in bed ,   HEENT: Normocephalic , Atraumatic, PERRL, JVP not raised  Chest: On inspection no use of accessory muscles of respiration, on auscultation vesicular breath sounds equal bilaterally no rales rhonchi or wheezing   cardiac: Regular rate and rhythm, S1, S2 normal. No murmurs, gallops, or rubs auscultated.   Abdomen:soft, non-tender; normal bowel sounds, no masses, no organomegaly.  Urogenital : no thomson, no suprapubic tenderness, no CVA tenderness  Extremities: No clubbing or cyanosis. No peripheral edema. Peripheral pulses palpable.  Arterial access over her right wrist and right groin  Neurologic: Alert and Cooperative , cranial nerves grossly intact, DTR equal, Power 5 /5   Psychology: No hallucination, no delusion, appropriate mood          Lab Review   Recent Results (from the past 24 hour(s))   Brain Natriuretic Peptide    Collection Time: 04/11/24  4:12 PM   Result Value Ref Range    Pro-BNP 1,599 (H) 0 - 124 pg/mL   D-Dimer, Quantitative    Collection Time: 04/11/24  4:12 PM   Result Value Ref Range    D-Dimer, Quant <0.27 0.00 - 0.48 ug/mL FEU   CBC with Auto Differential     Monocytes % 6.0 0.0 - 10.0 %    Eosinophils % 1.3 0.0 - 5.0 %    Basophils % 0.8 0.0 - 1.0 %    Neutrophils Absolute 3.7 1.5 - 7.5 K/uL    Immature Granulocytes # 0.0 K/uL    Lymphocytes Absolute 3.2 1.1 - 4.5 K/uL    Monocytes Absolute 0.50 0.00 - 0.90 K/uL    Eosinophils Absolute 0.10 0.00 - 0.60 K/uL    Basophils Absolute 0.10 0.00 - 0.20 K/uL   Anti-Xa, Unfractionated Heparin    Collection Time: 04/12/24  4:38 AM   Result Value Ref Range    Anti-XA Unfrac Heparin <0.10 (L) 0.30 - 0.70 IU/mL   POCT Glucose    Collection Time: 04/12/24  7:24 AM   Result Value Ref Range    POC Glucose 145 (H) 70 - 99 mg/dl    Performed on AccuChek    POCT Glucose    Collection Time: 04/12/24 10:56 AM   Result Value Ref Range    POC Glucose 184 (H) 70 - 99 mg/dl    Performed on AccuChek    EKG 12 Lead    Collection Time: 04/12/24  2:37 PM   Result Value Ref Range    P-R Interval 188 ms    QRS Duration 104 ms    Q-T Interval 452 ms    QTc Calculation (Bazett) 457 ms    P Axis 69 degrees    T Axis 91 degrees        No intake/output data recorded.     valsartan  80 mg Oral BID    [START ON 4/13/2024] clopidogrel  75 mg Oral Daily    tamoxifen  20 mg Oral Daily    pregabalin  75 mg Oral QAM AC    pregabalin  75 mg Oral Lunch    pregabalin  75 mg Oral Dinner    pregabalin  75 mg Oral QHS    metoprolol tartrate  50 mg Oral BID    gabapentin  600 mg Oral 4x Daily    aspirin  81 mg Oral Daily    insulin glargine  10 Units SubCUTAneous Nightly    insulin lispro  0-4 Units SubCUTAneous TID WC    insulin lispro  0-4 Units SubCUTAneous Nightly    sodium chloride flush  5-40 mL IntraVENous 2 times per day    atorvastatin  80 mg Oral Nightly           I have reviewed the patient's daily labs, including BMP and CBC with pertinent results discussed below.     Reviewed imaging     Assessment & Plan     Atypical chest pain likely from NSTEMI  Serial troponin 829, 603  EKG no acute changes  Cardiology reviewed  On heparin drip on admission  Aspirin

## 2024-04-12 NOTE — CONSULTS
[facility]  Cardiology Consult      Requesting MD:  Gustavo Craven MD   Admit Status:         History obtained from:   [] Patient  [] Other (specify):     PROBLEM LIST:    Patient Active Problem List    Diagnosis Date Noted    Status post left breast lumpectomy with SNB and IORT-11/11/2022 11/21/2022     Priority: Medium    Breast cancer metastasized to axillary lymph node, left ([facility]) 11/21/2022     Priority: Medium    Malignant neoplasm of overlapping sites of left breast in female, estrogen receptor positive ([facility]) 10/10/2022     Priority: Medium    NSTEMI (non-ST elevated myocardial infarction) ([facility]) 04/11/2024     Priority: Low    Numbness      Priority: Low    Angina pectoris, crescendo ([facility]) 06/12/2018     Priority: Low    Tobacco abuse      Priority: Low    Hypotension 01/14/2015     Priority: Low    CAD (coronary artery disease)      Priority: Low    HTN (hypertension)      Priority: Low    Hyperlipidemia      Priority: Low    Diabetes mellitus ([facility])      Priority: Low     1.  Coronary artery disease, PCI 12/3/2014 to mid LAD, prior catheterization 6/12/2018 with intermediate nonobstructive disease (OM1 ostial 60%, LAD mid 35%, RCA mid 50%, distal 55%), normal ejection fraction.  2.  Active ongoing longstanding tobacco use.  3.  Non-insulin-dependent diabetes mellitus.  4.  Left breast CA status post left lumpectomy and radiation therapy 11/2022.    PRESENTATION: Kerry Kruger is a 69 y.o. year old female who presents with complaints of chest pain that spread to her back and neck beginning 2 days ago around 11 PM.  Was constant and she went to the emergency room at [facility].  Symptoms resolved after 2 hours and she decided to sign out AGAINST MEDICAL ADVICE as she had dogs at home.  She was also short of breath with any activity.  No recurrent chest pain but shortness of breath has persisted and she came to the ER here.  EKG shows sinus rhythm with left axis deviation  12/3/14  MDL    stent to LAD. EF 60%     SECTION      COLONOSCOPY      HERNIA REPAIR      umbilical    THYROID SURGERY      partial    TONSILLECTOMY AND ADENOIDECTOMY      US BREAST BIOPSY W LOC DEVICE 1ST LESION LEFT Left 2022    US BREAST NEEDLE BIOPSY LEFT LPS GENERAL SURGERY       Allergies:  Hydrocodone and Sulfa antibiotics    Past Social History:  Social History     Socioeconomic History    Marital status: Legally      Spouse name: Not on file    Number of children: Not on file    Years of education: Not on file    Highest education level: Not on file   Occupational History    Not on file   Tobacco Use    Smoking status: Every Day     Current packs/day: 1.00     Average packs/day: 1 pack/day for 40.0 years (40.0 ttl pk-yrs)     Types: Cigarettes    Smokeless tobacco: Never   Vaping Use    Vaping Use: Never used   Substance and Sexual Activity    Alcohol use: No    Drug use: No    Sexual activity: Not on file   Other Topics Concern    Not on file   Social History Narrative    Not on file     Social Determinants of Health     Financial Resource Strain: Not on file   Food Insecurity: No Food Insecurity (2024)    Hunger Vital Sign     Worried About Running Out of Food in the Last Year: Never true     Ran Out of Food in the Last Year: Never true   Transportation Needs: No Transportation Needs (2024)    PRAPARE - Transportation     Lack of Transportation (Medical): No     Lack of Transportation (Non-Medical): No   Physical Activity: Not on file   Stress: Not on file   Social Connections: Not on file   Intimate Partner Violence: Not on file   Housing Stability: High Risk (2024)    Housing Stability Vital Sign     Unable to Pay for Housing in the Last Year: Yes     Number of Places Lived in the Last Year: 1     Unstable Housing in the Last Year: No       Family History:       Problem Relation Age of Onset    Heart Attack Father     Cancer Sister 58        Lung    Breast Cancer  from the thoracic inlet through the adrenals with IV contrast. Dose reduction techniques were utilized for this exam including automated exposure control, adjustments to mA and/or KV according to patient's size and the use of iterative reconstruction techniques. FINDINGS: Previously noted thin-walled cystic lesion in the posterior segment of the right upper lobe again identified unchanged measuring 2.0 x 1.7 cm. More inferiorly, the solid component of the lesion has continued to increase now measuring 1.1 cm (0.9 cm on prior). There are centrilobular emphysematous changes. No airspace infiltrates, consolidation, or pleural effusion. There is subpleural linear scarring in the right lung base. There is some mild dependent change and/or atelectasis in the right lower lobe posteriorly.. Unremarkable tracheobronchial tree. Heart size normal. Coronary artery calcifications present. Thoracic aorta normal caliber with scattered mixed plaque. No adenopathy. There are postoperative changes status post right-sided hemithyroidectomy. Thoracic inlet unremarkable. Within the upper abdomen, there is a partially visualized midline ventral abdominal wall hernia containing a portion of the transverse colon. This appears unchanged from prior. No acute process identified. Osseous structures normal for age.    IMPRESSION: Stable thin-walled cystic lesion in the posterior segment of the right upper lobe but with continued progressive increase in size of the nodular component located inferior aspect of the cystic lesion. It now measures 1.1 cm. Would recommend continued attention to follow-up per Fleischner criteria. Recommend follow-up CT 3-4 chest months. Consideration for CT PET if the lesion continues to increase in size. Centrilobular emphysematous changes. Midline ventral upper abdominal wall hernia as above.          Assessment and Plan:    This is a 69 y.o. year old female with past medical history of non-insulin-dependent  diabetes mellitus, recent left breast CVA 11/2022 treated with lumpectomy and radiation, active ongoing longstanding tobacco use, probable COPD, coronary artery disease with prior PCI to mid LAD 12/3/2014 with MAGGY, prior intermediate grade lesions by catheterization 6/12/2018, normal LV ejection fraction, presenting with non-STEMI with heart failure symptoms that are ongoing.    1.  Have discussed issues with patient.  Have also discussed her previous angiograms with coronary disease.  She is a diabetic though not insulin requiring.  I have discussed the absolute need for tobacco cessation going forward.  Likely progression of coronary disease with current presentation.  Blood pressure slightly elevated.  She will need coronary angiography and reevaluation of anatomy and possible revascularization.  2.  Currently on IV heparin, aspirin, statin therapy.  Initiated on beta-blocker with Lopressor 50 twice daily.  Add Diovan 80 mg twice daily.  3.  Have discussed management with patient and she is agreeable to proceed with cardiac catheterization.  This will be scheduled today.    Risks, benefits, alternatives of cardiac catheterization/PCI discussed with the patient and full informed consent obtained.  Mallampati score 2  Consent for moderate conscious sedation  ASA 3       Electronically signed by Maik Blanchard MD on 4/12/2024 at 10:15 AM    Thisdictation was generated by voice recognition computer software.  Although all attempts are made to edit the dictation for accuracy, there may be errors in the transcription that are not intended.

## 2024-04-12 NOTE — ED NOTES
ED TO INPATIENT SBAR HANDOFF    Patient Name: Kerry Kruger   : 1955  69 y.o.   Family/Caregiver Present: No  Code Status Order: Prior    C-SSRS: Risk of Suicide: No Risk  Sitter No  Restraints:         Situation  Chief Complaint:   Chief Complaint   Patient presents with    Chest Pain     Diagnosed with NSTEMI last night and left AMA      Patient Diagnosis: NSTEMI (non-ST elevated myocardial infarction) (HCC) [I21.4]     Brief Description of Patient's Condition: The patient is a 69 y.o. female who presented to [facility] ED for evaluation of shortness of breath. Pt has history of breast cancer followed by Dr. Moses, CAD with prior cardiac stent placement, hypertension, cigarette smoking, hyperlipidemia and diabetes.      Pt reports problems with unusual fatigue over past week having developed chest pressure last night associated with shortness of breath  an pain to her upper back/shoulder. She was evaluated at [facility] ED given concern for heart attack however decided to leave facility AMA. She relates that today she continues with problems with shortness of breath. She denies current chest pain as well as history of pe/dvt.      In ED, EKG without ischemic changes, troponin 826, case discussed with Dr. Blanchard by ED provider. Ddimer 0.27, sodium 142, creatinine 0.7/bun 10, glucose 161, cxr-No acute cardiopulmonary disease. Wbc 10.9, hgb 14.6, platelets 235k. Pt is admitted inpatient to hospitalist.     Mental Status: oriented and alert  Arrived from: home    Imaging:   XR CHEST PORTABLE   Final Result       1. No acute cardiopulmonary disease.   2. Suggestion of emphysema.   3. Postsurgical changes.               .           ______________________________________    Electronically signed by: OMARI RIVAS D.O.   Date:     2024   Time:    17:00         COVID-19 Results:   Internal Administration   First Dose      Second Dose           Last COVID Lab POC Creatinine (mg/dL)   Date Value   10/06/2022  following components:    Troponin, High Sensitivity 603 (*)     All other components within normal limits    Narrative:     CALL  Rodriguez  ELSY tel. ,  Chemistry results called to and read back by STEPHEN CHIN RN, 04/11/2024  20:52, by LUCY   LDL CHOLESTEROL, DIRECT - Abnormal; Notable for the following components:    LDL Direct 96 (*)     All other components within normal limits     Background  Allergies:   Allergies   Allergen Reactions    Hydrocodone Other (See Comments)     Severe headache, \"I couldn't move\"    Sulfa Antibiotics Itching     Current Medications:   Medications Administered         acetaminophen (TYLENOL) tablet 1,000 mg Admin Date  04/11/2024 Action  Given Dose  1,000 mg Rate   Route  Oral Administered By  Nancy Lawrence RN        heparin (porcine) injection 3,790 Units Admin Date  04/11/2024 Action  Given Dose  3,790 Units Rate   Route  IntraVENous Administered By  Nancy Lawrence RN        heparin 25,000 units in dextrose 5% 250 mL (premix) infusion Admin Date  04/11/2024 Action  New Bag Dose  12 Units/kg/hr Rate  7.6 mL/hr Route  IntraVENous Administered By  Nancy Lawrence RN            History:   Past Medical History:   Diagnosis Date    Breast lump     CAD (coronary artery disease)     [facility]    Diabetes mellitus ([facility])     GERD (gastroesophageal reflux disease)     History of blood transfusion     after c section    HTN (hypertension)     Hyperlipidemia     Malignant neoplasm of overlapping sites of left breast in female, estrogen receptor positive (HCC) 10/10/2022    Smoker     Thyroid disease     hx of surgery, no meds       Assessment  Vitals: Level of Consciousness: Alert (0)   Vitals:    04/11/24 1632 04/11/24 1914 04/11/24 1931 04/11/24 2013   BP: (!) 182/90 (!) 159/92 (!) 171/84 (!) 157/61   Pulse: 81 71 71 99   Resp: 18 18 18 17   Temp: 98.3 °F (36.8 °C)      SpO2: 96% 95% 96% 97%   Weight: 63 kg (139 lb)        Predictive Model Details          22

## 2024-04-12 NOTE — PROGRESS NOTES
4 Eyes Skin Assessment     NAME:  Kerry Kruger  YOB: 1955  MEDICAL RECORD NUMBER:  [id number]    The patient is being assessed for  Admission    I agree that at least one RN has performed a thorough Head to Toe Skin Assessment on the patient. ALL assessment sites listed below have been assessed.      Areas assessed by both nurses:    Head, Face, Ears, Shoulders, Back, Chest, Arms, Elbows, Hands, Sacrum. Buttock, Coccyx, Ischium, and Legs. Feet and Heels        Does the Patient have a Wound? No noted wound(s)       Anthony Prevention initiated by RN: No  Wound Care Orders initiated by RN: No    Pressure Injury (Stage 3,4, Unstageable, DTI, NWPT, and Complex wounds) if present, place Wound referral order by RN under : No    New Ostomies, if present place, Ostomy referral order under : No     Nurse 1 eSignature: Electronically signed by Mila Amato RN on 4/11/24 at 11:55 PM CDT    **SHARE this note so that the co-signing nurse can place an eSignature**    Nurse 2 eSignature: Electronically signed by Merline Suarez RN on 4/12/24 at 12:01 AM CDT

## 2024-04-12 NOTE — PROGRESS NOTES
Kerry Kruger arrived to room # 722.   Presented with: NSTEMI  Mental Status: Patient is oriented, alert, coherent, logical, thought processes intact, and able to concentrate and follow conversation.   Vitals:    04/11/24 2345   BP: (!) 155/96   Pulse: 67   Resp: 17   Temp: 97.5 °F (36.4 °C)   SpO2: 96%     Patient safety contract and falls prevention contract reviewed with patient Yes.  Oriented Patient to room.  Call light within reach. Yes.  Needs, issues or concerns expressed at this time: no.      Electronically signed by Mila Amato RN on 4/11/2024 at 11:53 PM

## 2024-04-13 VITALS
OXYGEN SATURATION: 98 % | TEMPERATURE: 97.5 F | RESPIRATION RATE: 18 BRPM | HEART RATE: 54 BPM | WEIGHT: 132.25 LBS | DIASTOLIC BLOOD PRESSURE: 50 MMHG | SYSTOLIC BLOOD PRESSURE: 112 MMHG | BODY MASS INDEX: 22.35 KG/M2

## 2024-04-13 LAB
ANION GAP SERPL CALCULATED.3IONS-SCNC: 11 MMOL/L (ref 7–19)
BASOPHILS # BLD: 0.1 K/UL (ref 0–0.2)
BASOPHILS NFR BLD: 0.8 % (ref 0–1)
BUN SERPL-MCNC: 11 MG/DL (ref 8–23)
CALCIUM SERPL-MCNC: 8.4 MG/DL (ref 8.8–10.2)
CHLORIDE SERPL-SCNC: 106 MMOL/L (ref 98–111)
CO2 SERPL-SCNC: 22 MMOL/L (ref 22–29)
CREAT SERPL-MCNC: 0.6 MG/DL (ref 0.5–0.9)
EOSINOPHIL # BLD: 0.1 K/UL (ref 0–0.6)
EOSINOPHIL NFR BLD: 1 % (ref 0–5)
ERYTHROCYTE [DISTWIDTH] IN BLOOD BY AUTOMATED COUNT: 14.5 % (ref 11.5–14.5)
GLUCOSE BLD-MCNC: 160 MG/DL (ref 70–99)
GLUCOSE BLD-MCNC: 187 MG/DL (ref 70–99)
GLUCOSE SERPL-MCNC: 127 MG/DL (ref 74–109)
HCT VFR BLD AUTO: 38.4 % (ref 37–47)
HGB BLD-MCNC: 12.9 G/DL (ref 12–16)
IMM GRANULOCYTES # BLD: 0 K/UL
LYMPHOCYTES # BLD: 2.6 K/UL (ref 1.1–4.5)
LYMPHOCYTES NFR BLD: 34.9 % (ref 20–40)
MCH RBC QN AUTO: 28.7 PG (ref 27–31)
MCHC RBC AUTO-ENTMCNC: 33.6 G/DL (ref 33–37)
MCV RBC AUTO: 85.5 FL (ref 81–99)
MONOCYTES # BLD: 0.5 K/UL (ref 0–0.9)
MONOCYTES NFR BLD: 6.8 % (ref 0–10)
NEUTROPHILS # BLD: 4.1 K/UL (ref 1.5–7.5)
NEUTS SEG NFR BLD: 56.1 % (ref 50–65)
PERFORMED ON: ABNORMAL
PERFORMED ON: ABNORMAL
PLATELET # BLD AUTO: 204 K/UL (ref 130–400)
PMV BLD AUTO: 12 FL (ref 9.4–12.3)
POTASSIUM SERPL-SCNC: 3.7 MMOL/L (ref 3.5–5)
POTASSIUM SERPL-SCNC: 3.7 MMOL/L (ref 3.5–5)
RBC # BLD AUTO: 4.49 M/UL (ref 4.2–5.4)
SODIUM SERPL-SCNC: 139 MMOL/L (ref 136–145)
WBC # BLD AUTO: 7.3 K/UL (ref 4.8–10.8)

## 2024-04-13 PROCEDURE — 80048 BASIC METABOLIC PNL TOTAL CA: CPT

## 2024-04-13 PROCEDURE — 2580000003 HC RX 258: Performed by: HOSPITALIST

## 2024-04-13 PROCEDURE — 85025 COMPLETE CBC W/AUTO DIFF WBC: CPT

## 2024-04-13 PROCEDURE — 82962 GLUCOSE BLOOD TEST: CPT

## 2024-04-13 PROCEDURE — 36415 COLL VENOUS BLD VENIPUNCTURE: CPT

## 2024-04-13 PROCEDURE — 99222 1ST HOSP IP/OBS MODERATE 55: CPT | Performed by: INTERNAL MEDICINE

## 2024-04-13 PROCEDURE — 6360000002 HC RX W HCPCS: Performed by: HOSPITALIST

## 2024-04-13 PROCEDURE — 6370000000 HC RX 637 (ALT 250 FOR IP): Performed by: INTERNAL MEDICINE

## 2024-04-13 PROCEDURE — 94760 N-INVAS EAR/PLS OXIMETRY 1: CPT

## 2024-04-13 PROCEDURE — 6370000000 HC RX 637 (ALT 250 FOR IP): Performed by: HOSPITALIST

## 2024-04-13 PROCEDURE — 6360000002 HC RX W HCPCS: Performed by: INTERNAL MEDICINE

## 2024-04-13 RX ORDER — METOPROLOL TARTRATE 50 MG/1
75 TABLET, FILM COATED ORAL 2 TIMES DAILY
Qty: 30 TABLET | Refills: 0 | Status: ON HOLD | OUTPATIENT
Start: 2024-04-13 | End: 2024-05-02

## 2024-04-13 RX ORDER — VALSARTAN 80 MG/1
80 TABLET ORAL 2 TIMES DAILY
Qty: 30 TABLET | Refills: 3 | Status: SHIPPED | OUTPATIENT
Start: 2024-04-13

## 2024-04-13 RX ORDER — CLOPIDOGREL BISULFATE 75 MG/1
75 TABLET ORAL DAILY
Qty: 30 TABLET | Refills: 3 | Status: SHIPPED | OUTPATIENT
Start: 2024-04-14

## 2024-04-13 RX ORDER — AMLODIPINE BESYLATE 2.5 MG/1
2.5 TABLET ORAL 2 TIMES DAILY
Qty: 30 TABLET | Refills: 3 | Status: ON HOLD | OUTPATIENT
Start: 2024-04-13 | End: 2024-05-02

## 2024-04-13 RX ORDER — AMLODIPINE BESYLATE 5 MG/1
2.5 TABLET ORAL 2 TIMES DAILY
Status: DISCONTINUED | OUTPATIENT
Start: 2024-04-13 | End: 2024-04-13 | Stop reason: HOSPADM

## 2024-04-13 RX ORDER — ATORVASTATIN CALCIUM 80 MG/1
80 TABLET, FILM COATED ORAL NIGHTLY
Qty: 30 TABLET | Refills: 3 | Status: SHIPPED | OUTPATIENT
Start: 2024-04-13

## 2024-04-13 RX ADMIN — OXYCODONE HYDROCHLORIDE AND ACETAMINOPHEN 1 TABLET: 10; 325 TABLET ORAL at 08:40

## 2024-04-13 RX ADMIN — CLOPIDOGREL BISULFATE 75 MG: 75 TABLET ORAL at 08:34

## 2024-04-13 RX ADMIN — ASPIRIN 81 MG: 81 TABLET, COATED ORAL at 08:34

## 2024-04-13 RX ADMIN — PREGABALIN 75 MG: 50 CAPSULE ORAL at 13:49

## 2024-04-13 RX ADMIN — PREGABALIN 75 MG: 25 CAPSULE ORAL at 08:33

## 2024-04-13 RX ADMIN — HYDRALAZINE HYDROCHLORIDE 10 MG: 20 INJECTION, SOLUTION INTRAMUSCULAR; INTRAVENOUS at 10:42

## 2024-04-13 RX ADMIN — METOPROLOL TARTRATE 50 MG: 50 TABLET, FILM COATED ORAL at 08:34

## 2024-04-13 RX ADMIN — SODIUM CHLORIDE, PRESERVATIVE FREE 10 ML: 5 INJECTION INTRAVENOUS at 08:34

## 2024-04-13 RX ADMIN — VALSARTAN 80 MG: 80 TABLET ORAL at 08:34

## 2024-04-13 RX ADMIN — TAMOXIFEN CITRATE 20 MG: 10 TABLET ORAL at 08:36

## 2024-04-13 RX ADMIN — HEPARIN SODIUM 5000 UNITS: 5000 INJECTION INTRAVENOUS; SUBCUTANEOUS at 08:35

## 2024-04-13 RX ADMIN — AMLODIPINE BESYLATE 2.5 MG: 5 TABLET ORAL at 13:51

## 2024-04-13 ASSESSMENT — PAIN DESCRIPTION - LOCATION: LOCATION: FOOT

## 2024-04-13 ASSESSMENT — PAIN SCALES - GENERAL
PAINLEVEL_OUTOF10: 0
PAINLEVEL_OUTOF10: 6

## 2024-04-13 ASSESSMENT — PAIN DESCRIPTION - ORIENTATION: ORIENTATION: RIGHT;LEFT

## 2024-04-13 ASSESSMENT — PAIN DESCRIPTION - DESCRIPTORS: DESCRIPTORS: ACHING

## 2024-04-13 NOTE — PROGRESS NOTES
Physical Therapy  pT states she has been up ad erma amb in room and hallway without difficulty and has no concerns about mobility once d/c'd  Electronically signed by Basilia Short PT on 4/13/2024 at 2:43 PM

## 2024-04-13 NOTE — PLAN OF CARE
Date & Time: 3/8/2024, 2:57 PM  Patient: Genna Menezes  Encounter Provider(s):    Steve Walker MD       To Whom It May Concern:    Genna Menezes was seen and treated in our department on 3/8/2024. She should not return to work until 03/15/2024 .    If you have any questions or concerns, please do not hesitate to call.        _____________________________  Physician/APC Signature              Problem: Discharge Planning  Goal: Discharge to home or other facility with appropriate resources  Outcome: Progressing     Problem: Safety - Adult  Goal: Free from fall injury  Outcome: Progressing     Problem: Pain  Goal: Verbalizes/displays adequate comfort level or baseline comfort level  Outcome: Progressing     Problem: Chronic Conditions and Co-morbidities  Goal: Patient's chronic conditions and co-morbidity symptoms are monitored and maintained or improved  Outcome: Progressing

## 2024-04-13 NOTE — PROGRESS NOTES
Cardiology Progress Note Maik Blanchard MD      Patient:  Kerry Kruger  [id number]    Patient Active Problem List    Diagnosis Date Noted    Status post left breast lumpectomy with SNB and IORT-11/11/2022 11/21/2022     Priority: Medium    Breast cancer metastasized to axillary lymph node, left (HCC) 11/21/2022     Priority: Medium    Malignant neoplasm of overlapping sites of left breast in female, estrogen receptor positive (HCC) 10/10/2022     Priority: Medium    NSTEMI (non-ST elevated myocardial infarction) ([facility]) 04/11/2024     Priority: Low    Numbness      Priority: Low    Angina pectoris, crescendo ([facility]) 06/12/2018     Priority: Low    Tobacco abuse      Priority: Low    Hypotension 01/14/2015     Priority: Low    CAD (coronary artery disease)      Priority: Low    HTN (hypertension)      Priority: Low    Hyperlipidemia      Priority: Low    Diabetes mellitus ([facility])      Priority: Low       Admit Date:  4/11/2024    Admission Problem List: Present on Admission:   NSTEMI (non-ST elevated myocardial infarction) ([facility])      Cardiac Specific Data:  Specialty Problems          Cardiology Problems    CAD (coronary artery disease)        HTN (hypertension)        Hyperlipidemia        Hypotension        Angina pectoris, crescendo ([facility])        * (Principal) NSTEMI (non-ST elevated myocardial infarction) ([facility])         1.  Coronary artery disease, PCI 12/3/2014 to mid LAD, prior catheterization 6/12/2018 with intermediate nonobstructive disease, late non-STEMI presentation with catheterization 4/12/2024 with suboccluded distal RCA with unsuccessful intervention (feeds RPDA only), PCI to restenotic mid LAD with MAGGY x 2, normal LV ejection fraction with mild inferior hypokinesis.  2.  Active ongoing longstanding tobacco use.  3.  Non-insulin-dependent diabetes mellitus.  4.  Left breast CA status post left lumpectomy and radiation therapy 11/2022.    Subjective:  Ms. Kruger is doing much better today.  No chest pain.  Some  minimal shortness of breath reported with walking but much improved.  No leg swelling.    Objective:   BP (!) 112/50   Pulse 54   Temp 97.5 °F (36.4 °C) (Temporal)   Resp 18   Wt 60 kg (132 lb 4 oz)   SpO2 98%   BMI 22.35 kg/m²       Intake/Output Summary (Last 24 hours) at 4/13/2024 1240  Last data filed at 4/13/2024 0954  Gross per 24 hour   Intake 240 ml   Output --   Net 240 ml       Prior to Admission medications    Medication Sig Start Date End Date Taking? Authorizing Provider   pregabalin (LYRICA) 75 MG capsule 1 capsule in the morning, at noon, in the evening, and at bedtime.    Sissy Acosta MD   tamoxifen (NOLVADEX) 20 MG tablet Take 1 tablet by mouth daily 4/5/23   Casey Moses MD   aspirin 81 MG EC tablet Take 1 tablet by mouth daily    Sissy Acosta MD   mupirocin (BACTROBAN) 2 % ointment Apply to each nostril BID x 5 days prior to surgery. 10/18/22   Srinivas Martinez PA-C   TRULICITY 0.75 MG/0.5ML SOPN once a week 7/30/22   Sissy Acosta MD   nitroGLYCERIN (NITROSTAT) 0.4 MG SL tablet Place 1 tablet under the tongue every 5 minutes as needed for Chest pain 6/12/18   Reagan Morataya MD   metFORMIN (GLUCOPHAGE) 1000 MG tablet Take 1 tablet by mouth 2 times daily (with meals) 6/14/18   Reagan Morataya MD   oxyCODONE-acetaminophen (PERCOCET)  MG per tablet Take 1 tablet by mouth 3 times daily. 5/10/16   Sissy Acosta MD   metoprolol (LOPRESSOR) 50 MG tablet Take 1 tablet by mouth 2 times daily  Patient taking differently: Take 1.5 tablets by mouth 2 times daily 5/13/15   Reagan Morataya MD   gabapentin (NEURONTIN) 600 MG tablet Take 1 tablet by mouth 4 times daily. 1/7/15   Sissy Acosta MD        amLODIPine  2.5 mg Oral BID    valsartan  80 mg Oral BID    clopidogrel  75 mg Oral Daily    heparin (porcine)  5,000 Units SubCUTAneous 3 times per day    tamoxifen  20 mg Oral Daily    pregabalin  75 mg Oral QAM AC    pregabalin  75 mg Oral  Data:  CBC:   Recent Labs     04/11/24  1639 04/12/24  0438 04/13/24  0213   WBC 10.9* 7.5 7.3   HGB 14.6 13.9 12.9   HCT 45.1 44.0 38.4   MCV 89.0 89.2 85.5    213 204     BMP:   Recent Labs     04/11/24  1639 04/12/24  0438 04/13/24  0213    142 139   K 3.8 3.6 3.7  3.7    105 106   CO2 21* 24 22   PHOS 3.0  --   --    BUN 10 11 11   CREATININE 0.7 0.7 0.6     LIVER PROFILE:   Recent Labs     04/11/24 1639   AST 47*   ALT 14   BILITOT 0.3   ALKPHOS 128*     PT/INR:   Recent Labs     04/11/24 2037   PROTIME 15.0*   INR 1.22*     APTT:   Recent Labs     04/11/24 2037   APTT 151.9*     BNP:  No results for input(s): \"BNP\" in the last 72 hours.  CK, CKMB, Troponin: @LABRCNT (CKTOTAL:3, CKMB:3, TROPONINI:3)@    IMAGING:  XR CHEST PORTABLE    Result Date: 4/11/2024  EXAM:  CHEST ONE-VIEW  HISTORY:  Chest pain  COMPARISON:  None  FINDINGS:  There are surgical clips at the rest of the thoracic inlet.  There are surgical clips in the left axilla and possible surgical clips in the left breast.  The lungs are hyperinflated. The cardiomediastinal silhouette is normal.  The pulmonary vasculature is normal.  No consolidating infiltrates are detected.  No pneumothoraces or pleural effusions.       1. No acute cardiopulmonary disease. 2. Suggestion of emphysema. 3. Postsurgical changes.    .   ______________________________________ Electronically signed by: OMARI RIVAS D.O. Date:     04/11/2024 Time:    17:00     X-Ray Chest Portable    Result Date: 4/11/2024 4/11/2024 12:17 AM CDT XR CHEST PORTABLE History:  69 years  Female Chest Pain REFERENCE EXAM: none FINDINGS: Single AP view of the chest submitted for review.  Cardiac/pericardial silhouette is unremarkable. No consolidation, pleural effusion or pneumothorax is seen.    IMPRESSION: No acute cardiopulmonary findings.    CT Chest With Contrast    Result Date: 4/1/2024  PROCEDURE:  CT CHEST W CONTRAST    4/1/2024 10:26 AM CDT CLINICAL HISTORY:   lung nodule seen on imaging study, Lung nodule seen on imaging study PRIOR STUDY:  11/10/2023 TECHNIQUE: Helical images were obtained from the thoracic inlet through the adrenals with IV contrast. Dose reduction techniques were utilized for this exam including automated exposure control, adjustments to mA and/or KV according to patient's size and the use of iterative reconstruction techniques. FINDINGS: Previously noted thin-walled cystic lesion in the posterior segment of the right upper lobe again identified unchanged measuring 2.0 x 1.7 cm. More inferiorly, the solid component of the lesion has continued to increase now measuring 1.1 cm (0.9 cm on prior). There are centrilobular emphysematous changes. No airspace infiltrates, consolidation, or pleural effusion. There is subpleural linear scarring in the right lung base. There is some mild dependent change and/or atelectasis in the right lower lobe posteriorly.. Unremarkable tracheobronchial tree. Heart size normal. Coronary artery calcifications present. Thoracic aorta normal caliber with scattered mixed plaque. No adenopathy. There are postoperative changes status post right-sided hemithyroidectomy. Thoracic inlet unremarkable. Within the upper abdomen, there is a partially visualized midline ventral abdominal wall hernia containing a portion of the transverse colon. This appears unchanged from prior. No acute process identified. Osseous structures normal for age.    IMPRESSION: Stable thin-walled cystic lesion in the posterior segment of the right upper lobe but with continued progressive increase in size of the nodular component located inferior aspect of the cystic lesion. It now measures 1.1 cm. Would recommend continued attention to follow-up per Fleischner criteria. Recommend follow-up CT 3-4 chest months. Consideration for CT PET if the lesion continues to increase in size. Centrilobular emphysematous changes. Midline ventral upper abdominal wall hernia

## 2024-04-13 NOTE — DISCHARGE SUMMARY
Discharge Summary    NAME: Kerry Kruger  :  1955  MRN:  [id number]    Admit date:  2024  Discharge date:      Admitting Physician:  No admitting provider for patient encounter.    Advance Directive: Full Code    Consults: cardiology    Primary Care Physician:  Roxanne Reynoso, APRN - CNP    HOSPITAL COURSE: The patient is a 69 y.o. female who presented to [facility] ED for evaluation of shortness of breath.      Pt has history of breast cancer followed by Dr. Moses, CAD with prior cardiac stent placement, hypertension, cigarette smoking, hyperlipidemia and diabetes.      Pt reports problems with unusual fatigue over past week having developed chest pressure last night associated with shortness of breath, and radiation of pain to her upper back/shoulder. She was evaluated at outlying ED given concern for heart attack however decided to leave facility AMA. She relates that today she continues with problems with shortness of breath. She denies current chest pain as well as history of pe/dvt.      In ED, EKG without ischemic changes, troponin 826, case discussed with Dr. Blanchard by ED provider.  Imaging reviewed.  Pt is admitted inpatient to hospitalist with cardiology consultation for intervention.        seen and evaluated after she came back from cardiac cath got 1 stent in the LAD, no acute complaint discussed about following up with comorbid condition and cardiology for discharge plan     seen and evaluated by cardiology, started on amlodipine, follow-up blood pressure is better cleared for discharge.  Seen at the bedside discussed about the present medical management and follow-up with cardiology as outpatient in 2 to week for outpatient coronary intervention, encourage the patient to compliance with medication and diabetic diet.     DISCHARGE DIAGNOSES WITH COURSE OF MANAGEMENT :   Principal Problem:    NSTEMI (non-ST elevated myocardial infarction) (HCC)  Resolved Problems:    * No resolved hospital  problems. *    Atypical chest pain likely from NSTEMI  Serial troponin 829, 603  EKG no acute changes  Cardiology reviewed  On heparin drip on admission  Aspirin 325 followed by 81 mg daily  Atorvastatin 80 mg daily  4/12 status post cardiac cath on more than 99% diffuse stenosis over the distal RCA stenting could not be done however mid LAD has 70% calcific stenosis status post MAGGY x 2  Follow-up with cardiology for discharge recommendation  4/13 cardiology recommend to follow-up in the next 2-week for outpatient cardiac cath for intervention on RCA  Recommend to continue on atorvastatin, aspirin and Plavix    Hypertension mildly uncontrolled  Continue on beta-blocker and Diovan  4/30 and started on Norvasc 2.5 mg p.o. twice daily  Vitals remained stable follow-up as outpatient     Dyslipidemia  Uncontrolled with LDL of 96  Will continue on atorvastatin 80 mg p.o. daily  Monitor fasting lipid panel as outpatient in 4 to 6 weeks     Diabetes mellitus mildly uncontrolled with A1c of 7.7  Presently on SSI insulin and Lantus  Continue on diabetic diet    History of smoking/tobacco use  Encouraged to refrain from tobacco    Physical Exam:  Vital Signs: BP (!) 112/50   Pulse 54   Temp 97.5 °F (36.4 °C) (Temporal)   Resp 18   Wt 60 kg (132 lb 4 oz)   SpO2 98%   BMI 22.35 kg/m²   General appearance:.Alert and Cooperative   HEENT: Normocephalic.  Chest: clear to auscultation bilaterally without wheezes or rhonchi.  Cardiac: Normal heart tones with regular rate and rhythm, S1, S2 normal. No murmurs, gallops, or rubs auscultated.   Abdomen:soft, non-tender; normal bowel sounds, no masses, no organomegaly.  Extremities: No clubbing or cyanosis. No peripheral edema. Peripheral pulses palpable.  No hematoma noted over the right groin  Neurologic: Grossly intact.    Significant Diagnostic Studies:   XR CHEST PORTABLE    Result Date: 4/11/2024  EXAM:  CHEST ONE-VIEW  HISTORY:  Chest pain  COMPARISON:  None  FINDINGS:  There

## 2024-04-14 LAB
EKG P AXIS: 69 DEGREES
EKG P-R INTERVAL: 188 MS
EKG Q-T INTERVAL: 452 MS
EKG QRS DURATION: 104 MS
EKG QTC CALCULATION (BAZETT): 457 MS
EKG T AXIS: 91 DEGREES

## 2024-04-14 PROCEDURE — 93010 ELECTROCARDIOGRAM REPORT: CPT | Performed by: INTERNAL MEDICINE

## 2024-04-15 ENCOUNTER — TELEPHONE (OUTPATIENT)
Dept: HEMATOLOGY | Age: 69
End: 2024-04-15

## 2024-04-15 ENCOUNTER — TELEPHONE (OUTPATIENT)
Dept: CARDIOLOGY CLINIC | Age: 69
End: 2024-04-15

## 2024-04-15 NOTE — CONSULTS
Cardiac Rehab MI/PTCA/Stent education packet was sent to the patient's address on record.  Handouts titled; \"Home Instructions Following a Cardiac Event\", \"A Healthy Heart: Care Instructions\",  \"Cardiac Diet/Low Cholesterol\", \"Cardiac Rehabilitation: An Individualized Supervised Program For You\" and a [facility] brochure were included.  Patient was instructed to contact [facility] or the hospital nearest their residence for the opportunity to enroll in Phase II Outpatient Cardiac Rehab.

## 2024-04-16 DIAGNOSIS — C50.912 BREAST CANCER METASTASIZED TO AXILLARY LYMPH NODE, LEFT (HCC): ICD-10-CM

## 2024-04-16 DIAGNOSIS — C77.3 BREAST CANCER METASTASIZED TO AXILLARY LYMPH NODE, LEFT (HCC): ICD-10-CM

## 2024-04-16 DIAGNOSIS — C50.812 MALIGNANT NEOPLASM OF OVERLAPPING SITES OF LEFT BREAST IN FEMALE, ESTROGEN RECEPTOR POSITIVE (HCC): ICD-10-CM

## 2024-04-16 DIAGNOSIS — Z17.0 MALIGNANT NEOPLASM OF OVERLAPPING SITES OF LEFT BREAST IN FEMALE, ESTROGEN RECEPTOR POSITIVE (HCC): ICD-10-CM

## 2024-04-16 RX ORDER — TAMOXIFEN CITRATE 20 MG/1
20 TABLET ORAL DAILY
Qty: 30 TABLET | Refills: 12 | Status: SHIPPED | OUTPATIENT
Start: 2024-04-16

## 2024-04-17 ENCOUNTER — TELEPHONE (OUTPATIENT)
Dept: CARDIOLOGY CLINIC | Age: 69
End: 2024-04-17

## 2024-04-17 NOTE — TELEPHONE ENCOUNTER
Called and spoke with patient and Cynthia SPRINGER), have patient scheduled for Staged PCI to RCA on 5/2/24 @ 11 am with an arrival of 9 am.   [place] at the [facility] located on the first floor of [facility].   Enter through hospital main entrance and turn immediately to your left.      Allergies:  Hydrocodone and Sulfa antibiotics       Cardiac Catheterization Instructions   Do not eat or drink anything after midnight on the night before your procedure.     Please take all your normal medications as prescribed the morning of your procedure. Please take all blood pressure medications.  Bring a list of the names and dosages of all the medications you are taking.      You should arrange to have someone take you home rather than drive yourself.  Further plan will depend upon the result of the cardiac catheterization.      If for any reason you are unable to keep this appointment, please contact [facility], [phone], as soon as possible to reschedule.

## 2024-04-18 ENCOUNTER — TELEPHONE (OUTPATIENT)
Dept: CARDIOLOGY CLINIC | Age: 69
End: 2024-04-18

## 2024-04-18 NOTE — TELEPHONE ENCOUNTER
DATE: 04.18.2024       CALLED: [phone]   SPOKE WITH:                    NOTES: No Answer; Unable to contact pt to find if she has any additional insurance information due to her [employer] Medicare Policy terming out on 04/30/2024.  Pt does not have vm.  \"I am sorry but the person in which you are calling does not have their voicemail box set up yet.  Please try your call again later.\"     Needing new insurance information on pt due to her current policy terming on 04/30/2024 and needing to obtain authorization from insurance for her upcoming cardiac procedure.

## 2024-04-29 NOTE — PROGRESS NOTES
Physician Progress Note      PATIENT:               KATERIN MIRANDA  CSN #:                  [id number]  :                       1955  ADMIT DATE:       2024 4:41 PM  DISCH DATE:        2024 5:15 PM  RESPONDING  PROVIDER #:        Maik Blanchard MD          QUERY TEXT:    Pt admitted with NSTEMI. Pt noted to have severe in-stent restenosis in the   mid LAD on the cardiac cath report. . If possible, please document in progress   notes and discharge summary the relationship, if any, between NSTEMI and   In-Stent Restenosis:    The medical record reflects the following:  Risk Factors: CAD< HTN, Previous Cardiac Stents,  Clinical Indicators: Pt. presented for shortness of breath and chest pain.   Completed a cardiac cath. CATH Report: double vessel disease with critical   culprit distal RCA stenosis with severe in-stent restenosis in the mid LAD.   TROP: 82, 603  Treatment: CARDIAC CATH, PCI to mid LAD with MAGGY x 2    Safia King, RN-BSN, CRCR  Clinical   [email or web address]  Options provided:  -- NSTEMI due to Severe In-Stent Restenosis in the mid LAD  -- NSTEMI due to Distal RCA stenosis  -- NSTEMI due to Severe in-stent restenosis and distal RCA stenosis  -- Other - I will add my own diagnosis  -- Disagree - Not applicable / Not valid  -- Disagree - Clinically unable to determine / Unknown  -- Refer to Clinical Documentation Reviewer    PROVIDER RESPONSE TEXT:    This patient has NSTEMI due to Distal RCA Stenosis.    Query created by: Kim King on 2024 8:47 AM      Electronically signed by:  Maik Blanchard MD 2024 8:20 AM

## 2024-05-02 ENCOUNTER — HOSPITAL ENCOUNTER (OUTPATIENT)
Dept: CARDIAC CATH/INVASIVE PROCEDURES | Age: 69
Discharge: HOME | End: 2024-05-02
Attending: INTERNAL MEDICINE | Admitting: INTERNAL MEDICINE
Payer: MEDICARE

## 2024-05-02 VITALS
DIASTOLIC BLOOD PRESSURE: 61 MMHG | SYSTOLIC BLOOD PRESSURE: 155 MMHG | HEIGHT: 65 IN | RESPIRATION RATE: 16 BRPM | HEART RATE: 61 BPM | TEMPERATURE: 97.2 F | OXYGEN SATURATION: 97 % | BODY MASS INDEX: 23.16 KG/M2 | WEIGHT: 139 LBS

## 2024-05-02 DIAGNOSIS — I25.10 CAD (CORONARY ARTERY DISEASE): ICD-10-CM

## 2024-05-02 DIAGNOSIS — I10 HTN (HYPERTENSION): ICD-10-CM

## 2024-05-02 LAB
EKG P AXIS: 68 DEGREES
EKG P-R INTERVAL: 204 MS
EKG Q-T INTERVAL: 454 MS
EKG QRS DURATION: 104 MS
EKG QTC CALCULATION (BAZETT): 447 MS
EKG T AXIS: 32 DEGREES

## 2024-05-02 PROCEDURE — 93454 CORONARY ARTERY ANGIO S&I: CPT

## 2024-05-02 PROCEDURE — 75716 ARTERY X-RAYS ARMS/LEGS: CPT

## 2024-05-02 PROCEDURE — 6360000002 HC RX W HCPCS: Performed by: INTERNAL MEDICINE

## 2024-05-02 PROCEDURE — 99153 MOD SED SAME PHYS/QHP EA: CPT

## 2024-05-02 PROCEDURE — 6370000000 HC RX 637 (ALT 250 FOR IP): Performed by: INTERNAL MEDICINE

## 2024-05-02 PROCEDURE — 99152 MOD SED SAME PHYS/QHP 5/>YRS: CPT

## 2024-05-02 PROCEDURE — 93005 ELECTROCARDIOGRAM TRACING: CPT

## 2024-05-02 PROCEDURE — C1725 CATH, TRANSLUMIN NON-LASER: HCPCS

## 2024-05-02 PROCEDURE — 2580000003 HC RX 258: Performed by: INTERNAL MEDICINE

## 2024-05-02 PROCEDURE — 2500000003 HC RX 250 WO HCPCS

## 2024-05-02 PROCEDURE — 93454 CORONARY ARTERY ANGIO S&I: CPT | Performed by: INTERNAL MEDICINE

## 2024-05-02 PROCEDURE — 6360000004 HC RX CONTRAST MEDICATION: Performed by: INTERNAL MEDICINE

## 2024-05-02 PROCEDURE — 36246 INS CATH ABD/L-EXT ART 2ND: CPT

## 2024-05-02 PROCEDURE — C1894 INTRO/SHEATH, NON-LASER: HCPCS

## 2024-05-02 PROCEDURE — 99152 MOD SED SAME PHYS/QHP 5/>YRS: CPT | Performed by: INTERNAL MEDICINE

## 2024-05-02 PROCEDURE — 6370000000 HC RX 637 (ALT 250 FOR IP)

## 2024-05-02 PROCEDURE — 2709999900 HC NON-CHARGEABLE SUPPLY

## 2024-05-02 PROCEDURE — 6360000002 HC RX W HCPCS

## 2024-05-02 PROCEDURE — C1760 CLOSURE DEV, VASC: HCPCS

## 2024-05-02 PROCEDURE — 92920 PRQ TRLUML C ANGIOP 1ART&/BR: CPT | Performed by: INTERNAL MEDICINE

## 2024-05-02 PROCEDURE — C1769 GUIDE WIRE: HCPCS

## 2024-05-02 PROCEDURE — C1887 CATHETER, GUIDING: HCPCS

## 2024-05-02 RX ORDER — SODIUM CHLORIDE 0.9 % (FLUSH) 0.9 %
5-40 SYRINGE (ML) INJECTION EVERY 12 HOURS SCHEDULED
Status: DISCONTINUED | OUTPATIENT
Start: 2024-05-02 | End: 2024-05-02 | Stop reason: HOSPADM

## 2024-05-02 RX ORDER — AMLODIPINE BESYLATE 2.5 MG/1
5 TABLET ORAL 2 TIMES DAILY
Qty: 30 TABLET | Refills: 3 | Status: SHIPPED | OUTPATIENT
Start: 2024-05-02

## 2024-05-02 RX ORDER — FENOFIBRATE 145 MG/1
1 TABLET, COATED ORAL DAILY
COMMUNITY
Start: 2023-06-30

## 2024-05-02 RX ORDER — SODIUM CHLORIDE 0.9 % (FLUSH) 0.9 %
5-40 SYRINGE (ML) INJECTION PRN
Status: DISCONTINUED | OUTPATIENT
Start: 2024-05-02 | End: 2024-05-02 | Stop reason: HOSPADM

## 2024-05-02 RX ORDER — SODIUM CHLORIDE 9 MG/ML
INJECTION, SOLUTION INTRAVENOUS PRN
Status: DISCONTINUED | OUTPATIENT
Start: 2024-05-02 | End: 2024-05-02 | Stop reason: HOSPADM

## 2024-05-02 RX ORDER — NITROGLYCERIN 0.4 MG/1
0.4 TABLET SUBLINGUAL EVERY 5 MIN PRN
Status: DISCONTINUED | OUTPATIENT
Start: 2024-05-02 | End: 2024-05-02 | Stop reason: HOSPADM

## 2024-05-02 RX ORDER — ALBUTEROL SULFATE 90 UG/1
2 AEROSOL, METERED RESPIRATORY (INHALATION) EVERY 6 HOURS PRN
COMMUNITY

## 2024-05-02 RX ORDER — ONDANSETRON 2 MG/ML
4 INJECTION INTRAMUSCULAR; INTRAVENOUS EVERY 6 HOURS PRN
Status: DISCONTINUED | OUTPATIENT
Start: 2024-05-02 | End: 2024-05-02 | Stop reason: HOSPADM

## 2024-05-02 RX ORDER — SODIUM CHLORIDE 9 MG/ML
INJECTION, SOLUTION INTRAVENOUS CONTINUOUS
Status: DISCONTINUED | OUTPATIENT
Start: 2024-05-02 | End: 2024-05-02 | Stop reason: HOSPADM

## 2024-05-02 RX ORDER — ASPIRIN 325 MG
325 TABLET ORAL ONCE
Status: COMPLETED | OUTPATIENT
Start: 2024-05-02 | End: 2024-05-02

## 2024-05-02 RX ORDER — METOPROLOL TARTRATE 50 MG/1
25 TABLET, FILM COATED ORAL 2 TIMES DAILY
Qty: 30 TABLET | Refills: 0 | Status: SHIPPED | OUTPATIENT
Start: 2024-05-02

## 2024-05-02 RX ADMIN — IOPAMIDOL 100 ML: 612 INJECTION, SOLUTION INTRAVENOUS at 14:55

## 2024-05-02 RX ADMIN — SODIUM CHLORIDE 75 ML/HR: 9 INJECTION, SOLUTION INTRAVENOUS at 09:53

## 2024-05-02 RX ADMIN — ASPIRIN 325 MG: 325 TABLET ORAL at 10:13

## 2024-05-02 RX ADMIN — ONDANSETRON 4 MG: 2 INJECTION INTRAMUSCULAR; INTRAVENOUS at 12:30

## 2024-05-02 ASSESSMENT — ENCOUNTER SYMPTOMS
COUGH: 0
EYE DISCHARGE: 0
DIARRHEA: 0
CONSTIPATION: 0
VOMITING: 0
SHORTNESS OF BREATH: 1
BLOOD IN STOOL: 0
ABDOMINAL DISTENTION: 0
WHEEZING: 0
BACK PAIN: 0
CHEST TIGHTNESS: 1

## 2024-05-02 NOTE — H&P
Patient:  Kerry Kruger                  1955  MRN: [id number]    PROBLEM LIST:    Patient Active Problem List    Diagnosis Date Noted    Status post left breast lumpectomy with SNB and IORT-11/11/2022 11/21/2022     Priority: Medium    Breast cancer metastasized to axillary lymph node, left ([facility]) 11/21/2022     Priority: Medium    Malignant neoplasm of overlapping sites of left breast in female, estrogen receptor positive ([facility]) 10/10/2022     Priority: Medium    NSTEMI (non-ST elevated myocardial infarction) ([facility]) 04/11/2024     Priority: Low    Numbness      Priority: Low    Angina pectoris, crescendo ([facility]) 06/12/2018     Priority: Low    Tobacco abuse      Priority: Low    Hypotension 01/14/2015     Priority: Low    CAD (coronary artery disease)      Priority: Low    HTN (hypertension)      Priority: Low    Hyperlipidemia      Priority: Low    Diabetes mellitus ([facility])      Priority: Low     1.  Coronary artery disease, PCI 12/3/2014 to mid LAD, prior catheterization 6/12/2018 with intermediate nonobstructive disease, late non-STEMI presentation with catheterization 4/12/2024 with suboccluded distal RCA with unsuccessful intervention (feeds RPDA only), PCI to restenotic mid LAD with MAGGY x 2, normal LV ejection fraction with mild inferior hypokinesis.  2.  Active ongoing longstanding tobacco use.  3.  Non-insulin-dependent diabetes mellitus.  4.  Left breast CA status post left lumpectomy and radiation therapy 11/2022.    PRESENTATION: Kerry Kruger is a 69 y.o. year old female who presents with recent admission 4/11/2024 with non-STEMI, cardiac catheterization 4/12/2024 with suboccluded distal RCA with unsuccessful intervention, PCI to restenotic mid LAD, here for reattempt at revascularization of RCA.  Patient with history of non-insulin-dependent diabetes mellitus, active ongoing tobacco use and left breast CA.  Continued tobacco use since discharge.    REVIEW OF SYSTEMS:  Review of Systems   Constitutional:  to Admission medications    Medication Sig Start Date End Date Taking? Authorizing Provider   tamoxifen (NOLVADEX) 20 MG tablet TAKE 1 TABLET BY MOUTH DAILY 4/16/24   Casey Moses MD   atorvastatin (LIPITOR) 80 MG tablet Take 1 tablet by mouth nightly 4/13/24   Gustavo Craven MD   valsartan (DIOVAN) 80 MG tablet Take 1 tablet by mouth 2 times daily 4/13/24   Gustavo Craven MD   metoprolol tartrate (LOPRESSOR) 50 MG tablet Take 1.5 tablets by mouth 2 times daily 4/13/24   Gustavo Craven MD   amLODIPine (NORVASC) 2.5 MG tablet Take 1 tablet by mouth in the morning and at bedtime 4/13/24   Gustavo Craven MD   clopidogrel (PLAVIX) 75 MG tablet Take 1 tablet by mouth daily 4/14/24   Gustavo Craven MD   pregabalin (LYRICA) 75 MG capsule 1 capsule in the morning, at noon, in the evening, and at bedtime.    Sissy Acosta MD   aspirin 81 MG EC tablet Take 1 tablet by mouth daily    Sissy Acosta MD   TRULICITY 0.75 MG/0.5ML SOPN once a week 7/30/22   Sissy Acosta MD   nitroGLYCERIN (NITROSTAT) 0.4 MG SL tablet Place 1 tablet under the tongue every 5 minutes as needed for Chest pain 6/12/18   Reagan Morataya MD   metFORMIN (GLUCOPHAGE) 1000 MG tablet Take 1 tablet by mouth 2 times daily (with meals) 6/14/18   Reagan Morataya MD   oxyCODONE-acetaminophen (PERCOCET)  MG per tablet Take 1 tablet by mouth 3 times daily. 5/10/16   Sissy Acosta MD   gabapentin (NEURONTIN) 600 MG tablet Take 1 tablet by mouth 4 times daily. 1/7/15   Sissy Acosta MD       Allergies:  Hydrocodone and Sulfa antibiotics    Past Social History:  Social History     Socioeconomic History    Marital status: Legally      Spouse name: Not on file    Number of children: Not on file    Years of education: Not on file    Highest education level: Not on file   Occupational History    Not on file   Tobacco Use    Smoking status: Every Day     Current packs/day: 1.00     Average packs/day: 1 pack/day

## 2024-05-02 NOTE — DISCHARGE INSTRUCTIONS
How can you care for yourself at home?   Activity   Do not do strenuous exercise and do not lift, pull, or push anything heavy until your doctor says it is okay. This may be for a day or two. You can walk around the house and do light activity, such as cooking.   You may shower 24 to 48 hours after the procedure, if your doctor okays it. Pat the incision dry. Do not take a bath for 1 week, or until your doctor tells you it is okay.   If the catheter was placed in your groin, try not to walk up stairs for the first couple of days.   If the catheter was placed in your arm near your wrist, do not bend your wrist deeply for the first couple of days. Be careful using your hand to get into and out of a chair or bed.   If your doctor recommends it, get more exercise. Walking is a good choice. Bit by bit, increase the amount you walk every day. Try for at least 30 minutes on most days of the week.  Diet   Drink plenty of fluids to help your body flush out the dye. If you have kidney, heart, or liver disease and have to limit fluids, talk with your doctor before you increase the amount of fluids you drink.   Keep eating a heart-healthy diet that has lots of fruits, vegetables, and whole grains. If you have not been eating this way, talk to your doctor. You also may want to talk to a dietitian. This expert can help you to learn about healthy foods and plan meals.  Medicines   Your doctor will tell you if and when you can restart your medicines. He or she will also give you instructions about taking any new medicines.   If you take blood thinners, such as warfarin (Coumadin), clopidogrel (Plavix), or aspirin, be sure to talk to your doctor. He or she will tell you if and when to start taking those medicines again. Make sure that you understand exactly what your doctor wants you to do.   Your doctor may prescribe a blood-thinning medicine like aspirin or clopidogrel (Plavix). It is very important that you take these medicines    Red streaks leading from the catheter site.   Pus draining from the catheter site.   A fever.  Your leg or arm looks blue or feels cold, numb, or tingly.  Watch closely for changes in your health, and be sure to contact your doctor if you have any problems.          SEE MYNX BOOKLET AS EXPLAINED & GIVEN.  Perclose device in Right groin, mynx device in left groin.

## 2024-05-03 NOTE — PROCEDURES
Cardiac catheterization preliminary note:    Distal RCA is occluded.  RPDA is a small vessel that is diffusely attenuated and fills retrograde from circumflex and septal collaterals.  Unsuccessful attempt at wiring distal RCA with simultaneous left coronary injection utilization.  Further intervention deferred.  Left SFA is noted 100% occluded with reconstitution in the supra popliteal area.  Right SFA is patent.    Plan: Medical management.  Reduce metoprolol dosage to 25 mg twice daily and reassess fatigue symptoms.

## 2024-05-09 ENCOUNTER — TELEPHONE (OUTPATIENT)
Dept: CARDIOLOGY CLINIC | Age: 69
End: 2024-05-09

## 2024-06-19 DIAGNOSIS — I10 HTN (HYPERTENSION): ICD-10-CM

## 2024-06-19 DIAGNOSIS — I25.10 CAD (CORONARY ARTERY DISEASE): ICD-10-CM

## 2024-06-20 ENCOUNTER — OFFICE VISIT (OUTPATIENT)
Dept: CARDIOLOGY CLINIC | Age: 69
End: 2024-06-20
Payer: MEDICARE

## 2024-06-20 VITALS
SYSTOLIC BLOOD PRESSURE: 112 MMHG | WEIGHT: 128 LBS | OXYGEN SATURATION: 97 % | HEART RATE: 68 BPM | HEIGHT: 64 IN | DIASTOLIC BLOOD PRESSURE: 60 MMHG | BODY MASS INDEX: 21.85 KG/M2

## 2024-06-20 DIAGNOSIS — E78.5 DYSLIPIDEMIA: ICD-10-CM

## 2024-06-20 DIAGNOSIS — I10 ESSENTIAL HYPERTENSION: ICD-10-CM

## 2024-06-20 DIAGNOSIS — I25.10 CORONARY ARTERY DISEASE INVOLVING NATIVE CORONARY ARTERY OF NATIVE HEART WITHOUT ANGINA PECTORIS: Primary | ICD-10-CM

## 2024-06-20 PROCEDURE — 1090F PRES/ABSN URINE INCON ASSESS: CPT | Performed by: NURSE PRACTITIONER

## 2024-06-20 PROCEDURE — 3078F DIAST BP <80 MM HG: CPT | Performed by: NURSE PRACTITIONER

## 2024-06-20 PROCEDURE — 99214 OFFICE O/P EST MOD 30 MIN: CPT | Performed by: NURSE PRACTITIONER

## 2024-06-20 PROCEDURE — 4004F PT TOBACCO SCREEN RCVD TLK: CPT | Performed by: NURSE PRACTITIONER

## 2024-06-20 PROCEDURE — G8400 PT W/DXA NO RESULTS DOC: HCPCS | Performed by: NURSE PRACTITIONER

## 2024-06-20 PROCEDURE — 3017F COLORECTAL CA SCREEN DOC REV: CPT | Performed by: NURSE PRACTITIONER

## 2024-06-20 PROCEDURE — 3074F SYST BP LT 130 MM HG: CPT | Performed by: NURSE PRACTITIONER

## 2024-06-20 PROCEDURE — G8427 DOCREV CUR MEDS BY ELIG CLIN: HCPCS | Performed by: NURSE PRACTITIONER

## 2024-06-20 PROCEDURE — 1123F ACP DISCUSS/DSCN MKR DOCD: CPT | Performed by: NURSE PRACTITIONER

## 2024-06-20 PROCEDURE — G8420 CALC BMI NORM PARAMETERS: HCPCS | Performed by: NURSE PRACTITIONER

## 2024-06-20 RX ORDER — CLOPIDOGREL BISULFATE 75 MG/1
75 TABLET ORAL DAILY
Qty: 90 TABLET | Refills: 3 | Status: SHIPPED | OUTPATIENT
Start: 2024-06-20

## 2024-06-20 RX ORDER — LOSARTAN POTASSIUM 100 MG/1
100 TABLET ORAL DAILY
COMMUNITY
Start: 2024-05-31 | End: 2024-06-20

## 2024-06-20 RX ORDER — METOPROLOL TARTRATE 50 MG/1
TABLET, FILM COATED ORAL
Qty: 90 TABLET | Refills: 3 | Status: SHIPPED | OUTPATIENT
Start: 2024-06-20

## 2024-06-20 RX ORDER — NITROGLYCERIN 0.4 MG/1
0.4 TABLET SUBLINGUAL EVERY 5 MIN PRN
Qty: 25 TABLET | Refills: 3 | Status: SHIPPED | OUTPATIENT
Start: 2024-06-20

## 2024-06-20 RX ORDER — AMLODIPINE BESYLATE 5 MG/1
5 TABLET ORAL 2 TIMES DAILY
Qty: 180 TABLET | Refills: 3 | Status: SHIPPED | OUTPATIENT
Start: 2024-06-20

## 2024-06-20 ASSESSMENT — ENCOUNTER SYMPTOMS
COUGH: 0
CHEST TIGHTNESS: 0
SHORTNESS OF BREATH: 0
WHEEZING: 0
SORE THROAT: 0

## 2024-06-20 NOTE — PROGRESS NOTES
[facility]   Established Patient Office Visit  [address]  [address]  [facility] [address] [phone]        OFFICE VISIT:  2024    Kerry Kruger - : 1955    Reason For Visit:  Kerry is a 69 y.o. female who is here for Follow-Up from Hospital    1. Coronary artery disease involving native coronary artery of native heart without angina pectoris    2. Essential hypertension    3. Dyslipidemia        Coronary artery disease, PCI 12/3/2014 to mid LAD, prior catheterization 2018 with intermediate nonobstructive disease, late non-STEMI presentation with catheterization 2024 with suboccluded distal RCA with unsuccessful intervention (feeds RPDA only), PCI to restenotic mid LAD with MAGGY x 2, normal LV ejection fraction with mild inferior hypokinesis.  2.  Active ongoing longstanding tobacco use.  3.  Non-insulin-dependent diabetes mellitus.  4.  Left breast CA status post left lumpectomy and radiation therapy 2022.    Patient on 2024 was admitted for cardiac catheterization.  She had had a recent admission in 2024 with non-STEMI that cardiac catheterization showed suboccluded distal RCA with unsuccessful intervention, PCI to restenotic mid LAD, and presented for reattempt at revascularization of the RCA.       Conclusions      Single-vessel disease with occluded distal RCA feeding and attenuated RPDA   that is collateralized from left system.   Unsuccessful attempt at PCI to RPDA.   Left SFA ostial 100% occluded with reconstitution and distal SFA via   profunda vessel.   Right SFA patent.      Recommendations      Medical management. Continue Plavix uninterrupted for 6 months.      Signatures      ----------------------------------------------------------------   Electronically signed by Maik Blanchard MD(Performing Physician) on   2024 20:33   ----------------------------------------------------------------    Patient presents to clinic today for procedural follow-up.

## (undated) DEVICE — GAUZE,SPONGE,FLUFF,6"X6.75",STRL,10/TRAY: Brand: MEDLINE

## (undated) DEVICE — GOWN,PREVENTION PLUS,XL,ST,24/CS: Brand: MEDLINE

## (undated) DEVICE — CATHETER URETH 24FR 30CC BLLN SILAS F 2 W SPEC M RND TIP

## (undated) DEVICE — COVER US PRB W5XL96IN LTX W/ GEL

## (undated) DEVICE — CLIP INT M L GRN TI TRNSVRS GRV CHEVRON SHP W/ PRECIS TIP

## (undated) DEVICE — SUTURE PERMAHAND SZ 2-0 L18IN NONABSORBABLE BLK L26MM SH C012D

## (undated) DEVICE — DRESSING TRNSPAR W5XL4.5IN FLM SHT SEMIPERMEABLE WIND

## (undated) DEVICE — DRESSING GRMCDL 6 12FR D1N CNTR HOLE 4MM ANTMCRBL PRTCTVE DI

## (undated) DEVICE — SPECIMEN ORIENTATION CHARMS, SIX DISTINCTLY SHAPED STERILE 10MM CHARMS: Brand: MARGINMAP

## (undated) DEVICE — SOLUTION IV IRRIG WATER 1000ML POUR BRL 2F7114

## (undated) DEVICE — GOWN,PREVENTION PLUS,2XL,ST,22/CS: Brand: MEDLINE

## (undated) DEVICE — KIT APPLICATOR BALLOON POUCH ST 3-4 CM

## (undated) DEVICE — MARKER SURG FIDUCIAL 2X3X1 CM SPACER CLP SFT TISS BIOZORB

## (undated) DEVICE — SHEET,DRAPE,53X77,STERILE: Brand: MEDLINE

## (undated) DEVICE — SYSTEM SKIN CLSR 22CM DERMBND PRINEO

## (undated) DEVICE — 3M™ IOBAN™ 2 ANTIMICROBIAL INCISE DRAPE 6650EZ: Brand: IOBAN™ 2

## (undated) DEVICE — SYRINGE IRRIG 60ML SFT PLIABLE BLB EZ TO GRP 1 HND USE W/

## (undated) DEVICE — DRAIN SURG 15FR 100% SIL RND END PERF

## (undated) DEVICE — PEN: MARKING STD 100/CS: Brand: MEDICAL ACTION INDUSTRIES

## (undated) DEVICE — MAJOR CDS

## (undated) DEVICE — PACK,UNIVERSAL,NO GOWNS: Brand: MEDLINE

## (undated) DEVICE — SUTURE VCRL SZ 3-0 L27IN ABSRB UD L26MM SH 1/2 CIR J416H

## (undated) DEVICE — GLOVE SURG SZ 75 CRM LTX FREE POLYISOPRENE POLYMER BEAD ANTI

## (undated) DEVICE — RESERVOIR,SUCTION,100CC,SILICONE: Brand: MEDLINE

## (undated) DEVICE — SUTURE MNCRYL STRATAFIX PS 4-0 30CM

## (undated) DEVICE — TOWEL,OR,DSP,ST,BLUE,DLX,4/PK,20PK/CS: Brand: MEDLINE

## (undated) DEVICE — PROBE DTECT MARGIN F/LUMPECTOMY

## (undated) DEVICE — SUTURE VCRL SZ 2-0 L36IN ABSRB UD L36MM CT-1 1/2 CIR J945H

## (undated) DEVICE — Device

## (undated) DEVICE — MASK O2 SAMPLING AD M LUER CAPNOXYGEN